# Patient Record
Sex: MALE | NOT HISPANIC OR LATINO | URBAN - METROPOLITAN AREA
[De-identification: names, ages, dates, MRNs, and addresses within clinical notes are randomized per-mention and may not be internally consistent; named-entity substitution may affect disease eponyms.]

---

## 2017-04-24 ENCOUNTER — INPATIENT (INPATIENT)
Facility: HOSPITAL | Age: 82
LOS: 7 days | Discharge: NURSING, REHAB OR LONG-TERM CARE | End: 2017-05-02
Attending: INTERNAL MEDICINE | Admitting: INTERNAL MEDICINE
Payer: COMMERCIAL

## 2017-04-24 VITALS
DIASTOLIC BLOOD PRESSURE: 90 MMHG | SYSTOLIC BLOOD PRESSURE: 148 MMHG | HEART RATE: 80 BPM | OXYGEN SATURATION: 98 % | RESPIRATION RATE: 15 BRPM | HEIGHT: 63 IN | WEIGHT: 134.92 LBS | TEMPERATURE: 98 F

## 2017-04-24 DIAGNOSIS — Z95.2 PRESENCE OF PROSTHETIC HEART VALVE: Chronic | ICD-10-CM

## 2017-04-24 DIAGNOSIS — K56.69 OTHER INTESTINAL OBSTRUCTION: Chronic | ICD-10-CM

## 2017-04-24 PROBLEM — Z00.00 ENCOUNTER FOR PREVENTIVE HEALTH EXAMINATION: Status: ACTIVE | Noted: 2017-04-24

## 2017-04-24 LAB
ALBUMIN SERPL ELPH-MCNC: 3.1 G/DL — LOW (ref 3.3–5)
ALP SERPL-CCNC: 125 U/L — HIGH (ref 40–120)
ALT FLD-CCNC: 13 U/L — SIGNIFICANT CHANGE UP (ref 12–78)
AMYLASE P1 CFR SERPL: 60 U/L — SIGNIFICANT CHANGE UP (ref 25–115)
ANION GAP SERPL CALC-SCNC: 14 MMOL/L — SIGNIFICANT CHANGE UP (ref 5–17)
APPEARANCE UR: CLEAR — SIGNIFICANT CHANGE UP
AST SERPL-CCNC: 15 U/L — SIGNIFICANT CHANGE UP (ref 15–37)
BACTERIA # UR AUTO: ABNORMAL
BASOPHILS # BLD AUTO: 0.1 K/UL — SIGNIFICANT CHANGE UP (ref 0–0.2)
BASOPHILS NFR BLD AUTO: 0.9 % — SIGNIFICANT CHANGE UP (ref 0–2)
BILIRUB SERPL-MCNC: 0.7 MG/DL — SIGNIFICANT CHANGE UP (ref 0.2–1.2)
BILIRUB UR-MCNC: NEGATIVE — SIGNIFICANT CHANGE UP
BUN SERPL-MCNC: 41 MG/DL — HIGH (ref 7–23)
CALCIUM SERPL-MCNC: 9.2 MG/DL — SIGNIFICANT CHANGE UP (ref 8.5–10.1)
CHLORIDE SERPL-SCNC: 100 MMOL/L — SIGNIFICANT CHANGE UP (ref 96–108)
CK MB BLD-MCNC: <1.4 % — SIGNIFICANT CHANGE UP (ref 0–3.5)
CK MB CFR SERPL CALC: <0.5 NG/ML — SIGNIFICANT CHANGE UP (ref 0.5–3.6)
CK SERPL-CCNC: 37 U/L — SIGNIFICANT CHANGE UP (ref 26–308)
CO2 SERPL-SCNC: 22 MMOL/L — SIGNIFICANT CHANGE UP (ref 22–31)
COLOR SPEC: YELLOW — SIGNIFICANT CHANGE UP
CREAT SERPL-MCNC: 1.31 MG/DL — HIGH (ref 0.5–1.3)
DIFF PNL FLD: ABNORMAL
EOSINOPHIL # BLD AUTO: 0 K/UL — SIGNIFICANT CHANGE UP (ref 0–0.5)
EOSINOPHIL NFR BLD AUTO: 0.1 % — SIGNIFICANT CHANGE UP (ref 0–6)
GLUCOSE SERPL-MCNC: 101 MG/DL — HIGH (ref 70–99)
GLUCOSE UR QL: NEGATIVE MG/DL — SIGNIFICANT CHANGE UP
HCT VFR BLD CALC: 35.9 % — LOW (ref 39–50)
HGB BLD-MCNC: 12.3 G/DL — LOW (ref 13–17)
HYALINE CASTS # UR AUTO: ABNORMAL /LPF
KETONES UR-MCNC: ABNORMAL
LEUKOCYTE ESTERASE UR-ACNC: ABNORMAL
LIDOCAIN IGE QN: 94 U/L — SIGNIFICANT CHANGE UP (ref 73–393)
LYMPHOCYTES # BLD AUTO: 1 K/UL — SIGNIFICANT CHANGE UP (ref 1–3.3)
LYMPHOCYTES # BLD AUTO: 6.6 % — LOW (ref 13–44)
MCHC RBC-ENTMCNC: 30.8 PG — SIGNIFICANT CHANGE UP (ref 27–34)
MCHC RBC-ENTMCNC: 34.3 GM/DL — SIGNIFICANT CHANGE UP (ref 32–36)
MCV RBC AUTO: 89.9 FL — SIGNIFICANT CHANGE UP (ref 80–100)
MONOCYTES # BLD AUTO: 0.9 K/UL — SIGNIFICANT CHANGE UP (ref 0–0.9)
MONOCYTES NFR BLD AUTO: 6.4 % — SIGNIFICANT CHANGE UP (ref 2–14)
NEUTROPHILS # BLD AUTO: 12.7 K/UL — HIGH (ref 1.8–7.4)
NEUTROPHILS NFR BLD AUTO: 86 % — HIGH (ref 43–77)
NITRITE UR-MCNC: NEGATIVE — SIGNIFICANT CHANGE UP
PH UR: 5 — SIGNIFICANT CHANGE UP (ref 5–8)
PLATELET # BLD AUTO: 394 K/UL — SIGNIFICANT CHANGE UP (ref 150–400)
POTASSIUM SERPL-MCNC: 4.1 MMOL/L — SIGNIFICANT CHANGE UP (ref 3.5–5.3)
POTASSIUM SERPL-SCNC: 4.1 MMOL/L — SIGNIFICANT CHANGE UP (ref 3.5–5.3)
PROT SERPL-MCNC: 7.9 GM/DL — SIGNIFICANT CHANGE UP (ref 6–8.3)
PROT UR-MCNC: 100 MG/DL
RBC # BLD: 4 M/UL — LOW (ref 4.2–5.8)
RBC # FLD: 13.4 % — SIGNIFICANT CHANGE UP (ref 11–15)
RBC CASTS # UR COMP ASSIST: ABNORMAL /HPF (ref 0–4)
SODIUM SERPL-SCNC: 136 MMOL/L — SIGNIFICANT CHANGE UP (ref 135–145)
SP GR SPEC: 1.02 — SIGNIFICANT CHANGE UP (ref 1.01–1.02)
TROPONIN I SERPL-MCNC: <.015 NG/ML — SIGNIFICANT CHANGE UP (ref 0.01–0.04)
UROBILINOGEN FLD QL: 1 MG/DL
WBC # BLD: 14.8 K/UL — HIGH (ref 3.8–10.5)
WBC # FLD AUTO: 14.8 K/UL — HIGH (ref 3.8–10.5)
WBC UR QL: SIGNIFICANT CHANGE UP

## 2017-04-24 PROCEDURE — 70450 CT HEAD/BRAIN W/O DYE: CPT | Mod: 26

## 2017-04-24 PROCEDURE — 71250 CT THORAX DX C-: CPT | Mod: 26

## 2017-04-24 PROCEDURE — 74176 CT ABD & PELVIS W/O CONTRAST: CPT | Mod: 26

## 2017-04-24 PROCEDURE — 99285 EMERGENCY DEPT VISIT HI MDM: CPT

## 2017-04-24 PROCEDURE — 71010: CPT | Mod: 26

## 2017-04-24 RX ORDER — SODIUM CHLORIDE 9 MG/ML
250 INJECTION INTRAMUSCULAR; INTRAVENOUS; SUBCUTANEOUS ONCE
Qty: 0 | Refills: 0 | Status: COMPLETED | OUTPATIENT
Start: 2017-04-24 | End: 2017-04-24

## 2017-04-24 RX ORDER — MORPHINE SULFATE 50 MG/1
2 CAPSULE, EXTENDED RELEASE ORAL ONCE
Qty: 0 | Refills: 0 | Status: DISCONTINUED | OUTPATIENT
Start: 2017-04-24 | End: 2017-04-24

## 2017-04-24 RX ORDER — IOHEXOL 300 MG/ML
30 INJECTION, SOLUTION INTRAVENOUS ONCE
Qty: 0 | Refills: 0 | Status: COMPLETED | OUTPATIENT
Start: 2017-04-24 | End: 2017-04-24

## 2017-04-24 RX ADMIN — SODIUM CHLORIDE 250 MILLILITER(S): 9 INJECTION INTRAMUSCULAR; INTRAVENOUS; SUBCUTANEOUS at 21:30

## 2017-04-24 RX ADMIN — IOHEXOL 30 MILLILITER(S): 300 INJECTION, SOLUTION INTRAVENOUS at 17:41

## 2017-04-24 RX ADMIN — SODIUM CHLORIDE 250 MILLILITER(S): 9 INJECTION INTRAMUSCULAR; INTRAVENOUS; SUBCUTANEOUS at 19:29

## 2017-04-24 NOTE — ED PROVIDER NOTE - MEDICAL DECISION MAKING DETAILS
lyndsey morse: pt was a signout pending CTs. CT shows b/l lower opacities, possible pna given mild cough/sorethroat and leukocytosis and weakness. pt will be admitted.

## 2017-04-24 NOTE — PHYSICAL THERAPY INITIAL EVALUATION ADULT - PERTINENT HX OF CURRENT PROBLEM, REHAB EVAL
Pt. stated "I just lost power in my legs while I was in the supermarket earlier today, I don't know why but I did not fall."

## 2017-04-24 NOTE — PHYSICAL THERAPY INITIAL EVALUATION ADULT - LIVES WITH, PROFILE
Pt. stated he lives alone in a private house,  food is arrangeed and delivered to his house, stated that he has his neighbors very helpful and helping each other in the block.

## 2017-04-24 NOTE — ED PROVIDER NOTE - PROGRESS NOTE DETAILS
patient seen and evaluated by  and PT; case discussed with Dr. garcia; nephew; patient pending ct results and re evaluation

## 2017-04-24 NOTE — ED PROVIDER NOTE - OBJECTIVE STATEMENT
89 yo malw with hisotry of sbo, coronary bypass presents by ems from [employer] store c/o generalized "weakness" for last two weeks. Patient denies fall, dizziness, abdominal pain, chest pain, diplopia, headache. Patient states that he feels stronger since being in ED.

## 2017-04-24 NOTE — ED ADULT NURSE NOTE - OBJECTIVE STATEMENT
As per PT " I was in the supermarket and all of a sudden I felt weak, I felt like a car running out of [place]as"

## 2017-04-25 DIAGNOSIS — J18.9 PNEUMONIA, UNSPECIFIED ORGANISM: ICD-10-CM

## 2017-04-25 DIAGNOSIS — R53.1 WEAKNESS: ICD-10-CM

## 2017-04-25 DIAGNOSIS — I10 ESSENTIAL (PRIMARY) HYPERTENSION: ICD-10-CM

## 2017-04-25 LAB
ANION GAP SERPL CALC-SCNC: 12 MMOL/L — SIGNIFICANT CHANGE UP (ref 5–17)
ANION GAP SERPL CALC-SCNC: 12 MMOL/L — SIGNIFICANT CHANGE UP (ref 5–17)
APTT BLD: 31.3 SEC — SIGNIFICANT CHANGE UP (ref 27.5–37.4)
BASOPHILS # BLD AUTO: 0.1 K/UL — SIGNIFICANT CHANGE UP (ref 0–0.2)
BASOPHILS NFR BLD AUTO: 0.7 % — SIGNIFICANT CHANGE UP (ref 0–2)
BUN SERPL-MCNC: 34 MG/DL — HIGH (ref 7–23)
BUN SERPL-MCNC: 36 MG/DL — HIGH (ref 7–23)
CALCIUM SERPL-MCNC: 8.6 MG/DL — SIGNIFICANT CHANGE UP (ref 8.5–10.1)
CALCIUM SERPL-MCNC: 8.8 MG/DL — SIGNIFICANT CHANGE UP (ref 8.5–10.1)
CHLORIDE SERPL-SCNC: 102 MMOL/L — SIGNIFICANT CHANGE UP (ref 96–108)
CHLORIDE SERPL-SCNC: 102 MMOL/L — SIGNIFICANT CHANGE UP (ref 96–108)
CO2 SERPL-SCNC: 22 MMOL/L — SIGNIFICANT CHANGE UP (ref 22–31)
CO2 SERPL-SCNC: 23 MMOL/L — SIGNIFICANT CHANGE UP (ref 22–31)
CREAT SERPL-MCNC: 1.03 MG/DL — SIGNIFICANT CHANGE UP (ref 0.5–1.3)
CREAT SERPL-MCNC: 1.19 MG/DL — SIGNIFICANT CHANGE UP (ref 0.5–1.3)
CRP SERPL-MCNC: 10.88 MG/DL — HIGH (ref 0–0.4)
EOSINOPHIL # BLD AUTO: 0 K/UL — SIGNIFICANT CHANGE UP (ref 0–0.5)
EOSINOPHIL NFR BLD AUTO: 0.4 % — SIGNIFICANT CHANGE UP (ref 0–6)
ERYTHROCYTE [SEDIMENTATION RATE] IN BLOOD: 82 MM/HR — HIGH (ref 0–20)
FLUAV SPEC QL CULT: NEGATIVE — SIGNIFICANT CHANGE UP
FLUBV AG SPEC QL IA: NEGATIVE — SIGNIFICANT CHANGE UP
GLUCOSE SERPL-MCNC: 106 MG/DL — HIGH (ref 70–99)
GLUCOSE SERPL-MCNC: 98 MG/DL — SIGNIFICANT CHANGE UP (ref 70–99)
HCT VFR BLD CALC: 33.1 % — LOW (ref 39–50)
HCT VFR BLD CALC: 34 % — LOW (ref 39–50)
HGB BLD-MCNC: 11.2 G/DL — LOW (ref 13–17)
HGB BLD-MCNC: 11.6 G/DL — LOW (ref 13–17)
INR BLD: 1.18 RATIO — HIGH (ref 0.88–1.16)
LACTATE SERPL-SCNC: 1.2 MMOL/L — SIGNIFICANT CHANGE UP (ref 0.7–2)
LACTATE SERPL-SCNC: 1.3 MMOL/L — SIGNIFICANT CHANGE UP (ref 0.7–2)
LYMPHOCYTES # BLD AUTO: 1.9 K/UL — SIGNIFICANT CHANGE UP (ref 1–3.3)
LYMPHOCYTES # BLD AUTO: 15.7 % — SIGNIFICANT CHANGE UP (ref 13–44)
MCHC RBC-ENTMCNC: 30 PG — SIGNIFICANT CHANGE UP (ref 27–34)
MCHC RBC-ENTMCNC: 30.6 PG — SIGNIFICANT CHANGE UP (ref 27–34)
MCHC RBC-ENTMCNC: 33.8 GM/DL — SIGNIFICANT CHANGE UP (ref 32–36)
MCHC RBC-ENTMCNC: 34.2 GM/DL — SIGNIFICANT CHANGE UP (ref 32–36)
MCV RBC AUTO: 88.6 FL — SIGNIFICANT CHANGE UP (ref 80–100)
MCV RBC AUTO: 89.5 FL — SIGNIFICANT CHANGE UP (ref 80–100)
MONOCYTES # BLD AUTO: 1.1 K/UL — HIGH (ref 0–0.9)
MONOCYTES NFR BLD AUTO: 8.9 % — SIGNIFICANT CHANGE UP (ref 2–14)
NEUTROPHILS # BLD AUTO: 9.3 K/UL — HIGH (ref 1.8–7.4)
NEUTROPHILS NFR BLD AUTO: 75.6 % — SIGNIFICANT CHANGE UP (ref 43–77)
PLATELET # BLD AUTO: 364 K/UL — SIGNIFICANT CHANGE UP (ref 150–400)
PLATELET # BLD AUTO: 388 K/UL — SIGNIFICANT CHANGE UP (ref 150–400)
POTASSIUM SERPL-MCNC: 3.7 MMOL/L — SIGNIFICANT CHANGE UP (ref 3.5–5.3)
POTASSIUM SERPL-MCNC: 3.8 MMOL/L — SIGNIFICANT CHANGE UP (ref 3.5–5.3)
POTASSIUM SERPL-SCNC: 3.7 MMOL/L — SIGNIFICANT CHANGE UP (ref 3.5–5.3)
POTASSIUM SERPL-SCNC: 3.8 MMOL/L — SIGNIFICANT CHANGE UP (ref 3.5–5.3)
PROCALCITONIN SERPL-MCNC: 0.29 NG/ML — HIGH (ref 0–0.05)
PROTHROM AB SERPL-ACNC: 12.9 SEC — HIGH (ref 9.8–12.7)
RBC # BLD: 3.74 M/UL — LOW (ref 4.2–5.8)
RBC # BLD: 3.8 M/UL — LOW (ref 4.2–5.8)
RBC # FLD: 13.4 % — SIGNIFICANT CHANGE UP (ref 11–15)
RBC # FLD: 13.5 % — SIGNIFICANT CHANGE UP (ref 11–15)
SODIUM SERPL-SCNC: 136 MMOL/L — SIGNIFICANT CHANGE UP (ref 135–145)
SODIUM SERPL-SCNC: 137 MMOL/L — SIGNIFICANT CHANGE UP (ref 135–145)
WBC # BLD: 12.3 K/UL — HIGH (ref 3.8–10.5)
WBC # BLD: 13.4 K/UL — HIGH (ref 3.8–10.5)
WBC # FLD AUTO: 12.3 K/UL — HIGH (ref 3.8–10.5)
WBC # FLD AUTO: 13.4 K/UL — HIGH (ref 3.8–10.5)

## 2017-04-25 PROCEDURE — 99223 1ST HOSP IP/OBS HIGH 75: CPT

## 2017-04-25 RX ORDER — AZITHROMYCIN 500 MG/1
500 TABLET, FILM COATED ORAL ONCE
Qty: 0 | Refills: 0 | Status: COMPLETED | OUTPATIENT
Start: 2017-04-25 | End: 2017-04-25

## 2017-04-25 RX ORDER — AZITHROMYCIN 500 MG/1
500 TABLET, FILM COATED ORAL EVERY 24 HOURS
Qty: 0 | Refills: 0 | Status: DISCONTINUED | OUTPATIENT
Start: 2017-04-25 | End: 2017-04-27

## 2017-04-25 RX ORDER — CEFTRIAXONE 500 MG/1
1 INJECTION, POWDER, FOR SOLUTION INTRAMUSCULAR; INTRAVENOUS ONCE
Qty: 0 | Refills: 0 | Status: COMPLETED | OUTPATIENT
Start: 2017-04-25 | End: 2017-04-25

## 2017-04-25 RX ORDER — PANTOPRAZOLE SODIUM 20 MG/1
40 TABLET, DELAYED RELEASE ORAL
Qty: 0 | Refills: 0 | Status: DISCONTINUED | OUTPATIENT
Start: 2017-04-25 | End: 2017-05-02

## 2017-04-25 RX ORDER — HEPARIN SODIUM 5000 [USP'U]/ML
5000 INJECTION INTRAVENOUS; SUBCUTANEOUS EVERY 12 HOURS
Qty: 0 | Refills: 0 | Status: DISCONTINUED | OUTPATIENT
Start: 2017-04-25 | End: 2017-04-30

## 2017-04-25 RX ORDER — CEFTRIAXONE 500 MG/1
1 INJECTION, POWDER, FOR SOLUTION INTRAMUSCULAR; INTRAVENOUS EVERY 24 HOURS
Qty: 0 | Refills: 0 | Status: DISCONTINUED | OUTPATIENT
Start: 2017-04-25 | End: 2017-05-02

## 2017-04-25 RX ADMIN — HEPARIN SODIUM 5000 UNIT(S): 5000 INJECTION INTRAVENOUS; SUBCUTANEOUS at 06:33

## 2017-04-25 RX ADMIN — CEFTRIAXONE 100 GRAM(S): 500 INJECTION, POWDER, FOR SOLUTION INTRAMUSCULAR; INTRAVENOUS at 01:03

## 2017-04-25 RX ADMIN — AZITHROMYCIN 255 MILLIGRAM(S): 500 TABLET, FILM COATED ORAL at 01:40

## 2017-04-25 RX ADMIN — HEPARIN SODIUM 5000 UNIT(S): 5000 INJECTION INTRAVENOUS; SUBCUTANEOUS at 17:33

## 2017-04-25 RX ADMIN — PANTOPRAZOLE SODIUM 40 MILLIGRAM(S): 20 TABLET, DELAYED RELEASE ORAL at 09:40

## 2017-04-25 RX ADMIN — AZITHROMYCIN 255 MILLIGRAM(S): 500 TABLET, FILM COATED ORAL at 17:33

## 2017-04-25 RX ADMIN — CEFTRIAXONE 100 GRAM(S): 500 INJECTION, POWDER, FOR SOLUTION INTRAMUSCULAR; INTRAVENOUS at 17:28

## 2017-04-25 NOTE — H&P ADULT. - RS GEN PE MLT RESP DETAILS PC
no rales/breath sounds equal/clear to auscultation bilaterally/good air movement/airway patent/no wheezes/respirations non-labored/no rhonchi

## 2017-04-25 NOTE — H&P ADULT. - HISTORY OF PRESENT ILLNESS
89 y/o man  PMH HTN CABG, AVR brought by EMS from grocery store c/o generalized "weakness" for last two weeks. Has had sore throat for 2 days, and non productive cough today, mild headache  but no chest pain, SOB, palpitations, wheezing, fever or chills. No sick contacts or travel. Patient denies fall, dizziness, vertigo, abdominal pain, chest pain, diplopia, speech difficulty, numbness, limb weakness. Patient states that he feels stronger since being in ED. Pt had flu shot .

## 2017-04-25 NOTE — H&P ADULT. - NEGATIVE CARDIOVASCULAR SYMPTOMS
no paroxysmal nocturnal dyspnea/no palpitations/no peripheral edema/no dyspnea on exertion/no chest pain/no orthopnea

## 2017-04-25 NOTE — H&P ADULT. - RADIOLOGY RESULTS AND INTERPRETATION
CT chest Bibasilar lower lobe pulmonary opacities likely reflect atelectasis   although some element of aspiration is possible

## 2017-04-25 NOTE — ED ADULT NURSE REASSESSMENT NOTE - NS ED NURSE REASSESS COMMENT FT1
received pt lying on stretcher alert and orientedx3 , denies any pain at -present. 2uage tot eht rt forearm

## 2017-04-25 NOTE — H&P ADULT. - GASTROINTESTINAL DETAILS
no rebound tenderness/nontender/soft/no guarding/no masses palpable/bowel sounds normal/no distention

## 2017-04-25 NOTE — H&P ADULT. - ASSESSMENT
89 y/o hypertensive woman with weakness and cough, also has sore throat with leukocytosis. Possible viral syndrome +/- pneumonia.  Rapid flu and respiratory viral panel sent. Will treat as outpatient pneumonia. 89 y/o hypertensive man with weakness and cough, also has sore throat with leukocytosis. Possible viral syndrome +/- pneumonia.  Rapid flu and respiratory viral panel sent. Will treat as outpatient pneumonia.

## 2017-04-25 NOTE — H&P ADULT. - NEGATIVE NEUROLOGICAL SYMPTOMS
no confusion/no vertigo/no loss of consciousness/no syncope/no paresthesias/no transient paralysis/no facial palsy/no weakness/no loss of sensation/no generalized seizures

## 2017-04-26 DIAGNOSIS — R26.81 UNSTEADINESS ON FEET: ICD-10-CM

## 2017-04-26 DIAGNOSIS — I15.9 SECONDARY HYPERTENSION, UNSPECIFIED: ICD-10-CM

## 2017-04-26 DIAGNOSIS — D72.829 ELEVATED WHITE BLOOD CELL COUNT, UNSPECIFIED: ICD-10-CM

## 2017-04-26 LAB
ALBUMIN SERPL ELPH-MCNC: 2.3 G/DL — LOW (ref 3.3–5)
ALP SERPL-CCNC: 95 U/L — SIGNIFICANT CHANGE UP (ref 40–120)
ALT FLD-CCNC: 10 U/L — LOW (ref 12–78)
ANION GAP SERPL CALC-SCNC: 9 MMOL/L — SIGNIFICANT CHANGE UP (ref 5–17)
AST SERPL-CCNC: 12 U/L — LOW (ref 15–37)
BILIRUB SERPL-MCNC: 0.4 MG/DL — SIGNIFICANT CHANGE UP (ref 0.2–1.2)
BUN SERPL-MCNC: 30 MG/DL — HIGH (ref 7–23)
CALCIUM SERPL-MCNC: 8.5 MG/DL — SIGNIFICANT CHANGE UP (ref 8.5–10.1)
CHLORIDE SERPL-SCNC: 105 MMOL/L — SIGNIFICANT CHANGE UP (ref 96–108)
CO2 SERPL-SCNC: 24 MMOL/L — SIGNIFICANT CHANGE UP (ref 22–31)
CREAT SERPL-MCNC: 1.07 MG/DL — SIGNIFICANT CHANGE UP (ref 0.5–1.3)
GLUCOSE SERPL-MCNC: 93 MG/DL — SIGNIFICANT CHANGE UP (ref 70–99)
HCT VFR BLD CALC: 32 % — LOW (ref 39–50)
HGB BLD-MCNC: 11.1 G/DL — LOW (ref 13–17)
MCHC RBC-ENTMCNC: 31.1 PG — SIGNIFICANT CHANGE UP (ref 27–34)
MCHC RBC-ENTMCNC: 34.7 GM/DL — SIGNIFICANT CHANGE UP (ref 32–36)
MCV RBC AUTO: 89.6 FL — SIGNIFICANT CHANGE UP (ref 80–100)
PLATELET # BLD AUTO: 382 K/UL — SIGNIFICANT CHANGE UP (ref 150–400)
POTASSIUM SERPL-MCNC: 3.8 MMOL/L — SIGNIFICANT CHANGE UP (ref 3.5–5.3)
POTASSIUM SERPL-SCNC: 3.8 MMOL/L — SIGNIFICANT CHANGE UP (ref 3.5–5.3)
PROCALCITONIN SERPL-MCNC: 0.19 NG/ML — HIGH (ref 0–0.05)
PROT SERPL-MCNC: 6.3 GM/DL — SIGNIFICANT CHANGE UP (ref 6–8.3)
RBC # BLD: 3.57 M/UL — LOW (ref 4.2–5.8)
RBC # FLD: 13.8 % — SIGNIFICANT CHANGE UP (ref 11–15)
SODIUM SERPL-SCNC: 138 MMOL/L — SIGNIFICANT CHANGE UP (ref 135–145)
WBC # BLD: 11.4 K/UL — HIGH (ref 3.8–10.5)
WBC # FLD AUTO: 11.4 K/UL — HIGH (ref 3.8–10.5)

## 2017-04-26 PROCEDURE — 99253 IP/OBS CNSLTJ NEW/EST LOW 45: CPT

## 2017-04-26 PROCEDURE — 99233 SBSQ HOSP IP/OBS HIGH 50: CPT

## 2017-04-26 RX ADMIN — HEPARIN SODIUM 5000 UNIT(S): 5000 INJECTION INTRAVENOUS; SUBCUTANEOUS at 17:33

## 2017-04-26 RX ADMIN — PANTOPRAZOLE SODIUM 40 MILLIGRAM(S): 20 TABLET, DELAYED RELEASE ORAL at 05:24

## 2017-04-26 RX ADMIN — AZITHROMYCIN 255 MILLIGRAM(S): 500 TABLET, FILM COATED ORAL at 17:22

## 2017-04-26 RX ADMIN — HEPARIN SODIUM 5000 UNIT(S): 5000 INJECTION INTRAVENOUS; SUBCUTANEOUS at 05:24

## 2017-04-26 RX ADMIN — CEFTRIAXONE 100 GRAM(S): 500 INJECTION, POWDER, FOR SOLUTION INTRAMUSCULAR; INTRAVENOUS at 17:32

## 2017-04-26 NOTE — CONSULT NOTE ADULT - SUBJECTIVE AND OBJECTIVE BOX
Infectious Diseases - Attending at Dr. Ni    HPI:  89 y/o man  PMH HTN CABG, AVR brought by EMS from grocery store c/o generalized "weakness" for last two weeks.occasional cough for a long time .  but no chest pain, SOB, palpitations, wheezing, fever or chills. No sick contacts or travel. Patient denies fall, dizziness, vertigo, abdominal pain, chest pain, diplopia, speech difficulty, numbness, limb weakness. Patient states that he feels stronger since being in ED. Pt had flu shot . (25 Apr 2017 02:28)    Ct abdomen /chest shows basal infiltrates    PAST MEDICAL & SURGICAL HISTORY:  Shingles  SBO (small bowel obstruction)  HTN (hypertension)  S/P AVR (aortic valve replacement)  SBO (small bowel obstruction)      Allergies    No Known Allergies    Intolerances        FAMILY HISTORY:  No pertinent family history in first degree relatives      SOCIAL HISTORY:exsmoker    REVIEW OF SYSTEMS:    Constitutional: No fever, weight loss +weakness  Eyes: No eye pain, visual disturbances, or discharge  ENT:  No difficulty hearing, tinnitus, vertigo; No sinus or throat pain  Neck: No pain or stiffness  Respiratory: occasional  cough, No wheezing, chills or hemoptysis  Cardiovascular: No chest pain, palpitations, shortness of breath, dizziness or leg swelling  Gastrointestinal: No abdominal or epigastric pain. No nausea, vomiting or hematemesis; No diarrhea or constipation. No melena or hematochezia.  Genitourinary: No dysuria, frequency, hematuria or incontinence  Neurological: No headaches, memory loss, loss of strength, numbness or tremors  Skin: No itching, burning, rashes or lesions       MEDICATIONS  (STANDING):  azithromycin  IVPB 500milliGRAM(s) IV Intermittent every 24 hours  cefTRIAXone   IVPB 1Gram(s) IV Intermittent every 24 hours  heparin  Injectable 5000Unit(s) SubCutaneous every 12 hours  pantoprazole    Tablet 40milliGRAM(s) Oral before breakfast    MEDICATIONS  (PRN):      Vital Signs Last 24 Hrs  T(C): 36.7, Max: 36.9 (04-26 @ 05:00)  T(F): 98, Max: 98.4 (04-26 @ 05:00)  HR: 78 (68 - 84)  BP: 166/74 (120/73 - 166/74)  BP(mean): --  RR: 18 (16 - 18)  SpO2: 98% (98% - 98%)    PHYSICAL EXAM:    Constitutional: NAD, well-groomed, well-developed  HEENT: PERRLA, EOMI, Normal Hearing, MMM  Neck: No LAD, No JVD  Back: Normal spine flexure, No CVA tenderness  Respiratory: CTAB/L  Cardiovascular: S1 and S2, RRR, no M/G/R  Gastrointestinal: BS+, soft, NT/ND  Extremities: No peripheral edema  Vascular: 2+ peripheral pulses  Neurological: A/O x 3, no focal deficits  Skin: No rashes      LABS:                        11.1   11.4  )-----------( 382      ( 26 Apr 2017 07:32 )             32.0     04-26    138  |  105  |  30<H>  ----------------------------<  93  3.8   |  24  |  1.07    Ca    8.5      26 Apr 2017 07:32    TPro  6.3  /  Alb  2.3<L>  /  TBili  0.4  /  DBili  x   /  AST  12<L>  /  ALT  10<L>  /  AlkPhos  95  04-26    PT/INR - ( 25 Apr 2017 01:10 )   PT: 12.9 sec;   INR: 1.18 ratio         PTT - ( 25 Apr 2017 01:10 )  PTT:31.3 sec      MICROBIOLOGY:  RECENT CULTURES:  04-25 .Blood Blood-Venous XXXX XXXX   No growth to date.          RADIOLOGY & ADDITIONAL STUDIES:  CT chest -bibasilar infiltrates

## 2017-04-26 NOTE — PROGRESS NOTE ADULT - SUBJECTIVE AND OBJECTIVE BOX
Patient is a 89y old  Male who presents with a chief complaint of Increasing weakness over several weeks, cough today. (2017 02:28)      OVERNIGHT EVENTS: none    REVIEW OF SYSTEMS: poor historian, denies CP/SOB, F/C, Headache, dizziness, nausea, vomiting, abdominal pain     MEDICATIONS  (STANDING):  azithromycin  IVPB 500milliGRAM(s) IV Intermittent every 24 hours  cefTRIAXone   IVPB 1Gram(s) IV Intermittent every 24 hours  heparin  Injectable 5000Unit(s) SubCutaneous every 12 hours  pantoprazole    Tablet 40milliGRAM(s) Oral before breakfast    MEDICATIONS  (PRN):      Allergies    No Known Allergies    Intolerances        T(F): 98, Max: 98.4 ( @ 05:00)  HR: 77 (67 - 84)  BP: 127/68 (120/73 - 148/87)  RR: 18 (16 - 18)  SpO2: 98% (98% - 98%)  Wt(kg): --    PHYSICAL EXAM:  GENERAL: NAD, well-groomed, well-developed, elderly male   HEAD:  Atraumatic, Normocephalic  EYES: EOMI, PERRLA, conjunctiva and sclera clear  ENMT: No tonsillar erythema, exudates, or enlargement; Moist mucous membranes, Good dentition, No lesions  NECK: Supple, No JVD, Normal thyroid  NERVOUS SYSTEM:  awake, Alert, Good concentration; Motor Strength 5/5 B/L upper and lower extremities; DTRs 2+ intact and symmetric  CHEST/LUNG: Clear to percussion bilaterally; No rales, rhonchi, wheezing, or rubs BL  HEART: Regular rate and rhythm; No murmurs, rubs, or gallops  ABDOMEN: Soft, Nontender, Nondistended; Bowel sounds present  EXTREMITIES:  2+ Peripheral Pulses, No clubbing, cyanosis, or edema BL LE  LYMPH: No lymphadenopathy noted  SKIN: No rashes or lesions    LABS:                        11.1   11.4  )-----------( 382      ( 2017 07:32 )             32.0         138  |  105  |  30<H>  ----------------------------<  93  3.8   |  24  |  1.07    Ca    8.5      2017 07:32    TPro  6.3  /  Alb  2.3<L>  /  TBili  0.4  /  DBili  x   /  AST  12<L>  /  ALT  10<L>  /  AlkPhos  95  04-26    PT/INR - ( 2017 01:10 )   PT: 12.9 sec;   INR: 1.18 ratio         PTT - ( 2017 01:10 )  PTT:31.3 sec  Urinalysis Basic - ( 2017 17:18 )    Color: Yellow / Appearance: Clear / S.025 / pH: x  Gluc: x / Ketone: Trace  / Bili: Negative / Urobili: 1 mg/dL   Blood: x / Protein: 100 mg/dL / Nitrite: Negative   Leuk Esterase: Trace / RBC: 3-5 /HPF / WBC 3-5   Sq Epi: x / Non Sq Epi: x / Bacteria: Moderate      Cultures;   CAPILLARY BLOOD GLUCOSE    Lipid panel:     CARDIAC MARKERS ( 2017 16:49 )  <.015 ng/mL / x     / 37 U/L / x     / <0.5 ng/mL        RADIOLOGY & ADDITIONAL TESTS:    Imaging Personally Reviewed:  [x ] YES      Consultant(s) Notes Reviewed:  [x ] YES     Care Discussed with [x ] Consultants [X ] Patient [x ] Family; Nephew   [x ]    [x ]  Other; RN

## 2017-04-26 NOTE — PROGRESS NOTE ADULT - ASSESSMENT
89 y/o hypertensive man with weakness and cough, also has sore throat with leukocytosis. Possible viral syndrome +/- pneumonia. Rapid flu and respiratory viral panel neg. CXR-Resolution of congestive changes noted previously. Residual opacity left lung base. CT imaging of the chest recommended. Status post CABG. Blood cx- no growth. TTE- EF - 55 to 60%.

## 2017-04-27 DIAGNOSIS — L89.151 PRESSURE ULCER OF SACRAL REGION, STAGE 1: ICD-10-CM

## 2017-04-27 LAB
ANION GAP SERPL CALC-SCNC: 12 MMOL/L — SIGNIFICANT CHANGE UP (ref 5–17)
BASOPHILS # BLD AUTO: 0.1 K/UL — SIGNIFICANT CHANGE UP (ref 0–0.2)
BASOPHILS NFR BLD AUTO: 0.6 % — SIGNIFICANT CHANGE UP (ref 0–2)
BUN SERPL-MCNC: 28 MG/DL — HIGH (ref 7–23)
CALCIUM SERPL-MCNC: 8.7 MG/DL — SIGNIFICANT CHANGE UP (ref 8.5–10.1)
CHLORIDE SERPL-SCNC: 102 MMOL/L — SIGNIFICANT CHANGE UP (ref 96–108)
CO2 SERPL-SCNC: 24 MMOL/L — SIGNIFICANT CHANGE UP (ref 22–31)
CREAT SERPL-MCNC: 1.12 MG/DL — SIGNIFICANT CHANGE UP (ref 0.5–1.3)
EOSINOPHIL # BLD AUTO: 0.1 K/UL — SIGNIFICANT CHANGE UP (ref 0–0.5)
EOSINOPHIL NFR BLD AUTO: 0.5 % — SIGNIFICANT CHANGE UP (ref 0–6)
GLUCOSE SERPL-MCNC: 90 MG/DL — SIGNIFICANT CHANGE UP (ref 70–99)
HCT VFR BLD CALC: 34 % — LOW (ref 39–50)
HGB BLD-MCNC: 11.6 G/DL — LOW (ref 13–17)
LYMPHOCYTES # BLD AUTO: 17 % — SIGNIFICANT CHANGE UP (ref 13–44)
LYMPHOCYTES # BLD AUTO: 2 K/UL — SIGNIFICANT CHANGE UP (ref 1–3.3)
MCHC RBC-ENTMCNC: 30.6 PG — SIGNIFICANT CHANGE UP (ref 27–34)
MCHC RBC-ENTMCNC: 34 GM/DL — SIGNIFICANT CHANGE UP (ref 32–36)
MCV RBC AUTO: 89.8 FL — SIGNIFICANT CHANGE UP (ref 80–100)
MONOCYTES # BLD AUTO: 1 K/UL — HIGH (ref 0–0.9)
MONOCYTES NFR BLD AUTO: 8.1 % — SIGNIFICANT CHANGE UP (ref 2–14)
NEUTROPHILS # BLD AUTO: 8.9 K/UL — HIGH (ref 1.8–7.4)
NEUTROPHILS NFR BLD AUTO: 73.8 % — SIGNIFICANT CHANGE UP (ref 43–77)
PLATELET # BLD AUTO: 422 K/UL — HIGH (ref 150–400)
POTASSIUM SERPL-MCNC: 3.9 MMOL/L — SIGNIFICANT CHANGE UP (ref 3.5–5.3)
POTASSIUM SERPL-SCNC: 3.9 MMOL/L — SIGNIFICANT CHANGE UP (ref 3.5–5.3)
RBC # BLD: 3.78 M/UL — LOW (ref 4.2–5.8)
RBC # FLD: 13.6 % — SIGNIFICANT CHANGE UP (ref 11–15)
SODIUM SERPL-SCNC: 138 MMOL/L — SIGNIFICANT CHANGE UP (ref 135–145)
WBC # BLD: 12.1 K/UL — HIGH (ref 3.8–10.5)
WBC # FLD AUTO: 12.1 K/UL — HIGH (ref 3.8–10.5)

## 2017-04-27 PROCEDURE — 99233 SBSQ HOSP IP/OBS HIGH 50: CPT

## 2017-04-27 RX ORDER — AMLODIPINE BESYLATE 2.5 MG/1
10 TABLET ORAL DAILY
Qty: 0 | Refills: 0 | Status: DISCONTINUED | OUTPATIENT
Start: 2017-04-27 | End: 2017-05-02

## 2017-04-27 RX ORDER — AZITHROMYCIN 500 MG/1
500 TABLET, FILM COATED ORAL DAILY
Qty: 0 | Refills: 0 | Status: DISCONTINUED | OUTPATIENT
Start: 2017-04-27 | End: 2017-05-02

## 2017-04-27 RX ADMIN — HEPARIN SODIUM 5000 UNIT(S): 5000 INJECTION INTRAVENOUS; SUBCUTANEOUS at 05:11

## 2017-04-27 RX ADMIN — CEFTRIAXONE 100 GRAM(S): 500 INJECTION, POWDER, FOR SOLUTION INTRAMUSCULAR; INTRAVENOUS at 17:28

## 2017-04-27 RX ADMIN — AMLODIPINE BESYLATE 10 MILLIGRAM(S): 2.5 TABLET ORAL at 10:58

## 2017-04-27 RX ADMIN — Medication 200 MILLIGRAM(S): at 12:34

## 2017-04-27 RX ADMIN — PANTOPRAZOLE SODIUM 40 MILLIGRAM(S): 20 TABLET, DELAYED RELEASE ORAL at 05:11

## 2017-04-27 RX ADMIN — HEPARIN SODIUM 5000 UNIT(S): 5000 INJECTION INTRAVENOUS; SUBCUTANEOUS at 17:31

## 2017-04-27 RX ADMIN — AZITHROMYCIN 255 MILLIGRAM(S): 500 TABLET, FILM COATED ORAL at 18:02

## 2017-04-27 NOTE — PROGRESS NOTE ADULT - SUBJECTIVE AND OBJECTIVE BOX
Patient is a 89y old  Male who presents with a chief complaint of Increasing weakness over several weeks, cough today. (25 Apr 2017 02:28)      INTERVAL HPI / OVERNIGHT EVENTS:    MEDICATIONS  (STANDING):  cefTRIAXone   IVPB 1Gram(s) IV Intermittent every 24 hours  heparin  Injectable 5000Unit(s) SubCutaneous every 12 hours  pantoprazole    Tablet 40milliGRAM(s) Oral before breakfast  amLODIPine   Tablet 10milliGRAM(s) Oral daily  azithromycin   Tablet 500milliGRAM(s) Oral daily    MEDICATIONS  (PRN):  guaiFENesin   Syrup  (Sugar-Free) 200milliGRAM(s) Oral every 6 hours PRN Cough      Vital Signs Last 24 Hrs  T(C): 37, Max: 37.7 (04-27 @ 10:10)  T(F): 98.6, Max: 99.8 (04-27 @ 10:10)  HR: 88 (82 - 88)  BP: 150/69 (148/77 - 173/76)  BP(mean): --  RR: 16 (16 - 29)  SpO2: 99% (98% - 99%)    PHYSICAL EXAM:  General :NAD  Constitutional:  well-groomed, well-developed  Respiratory: CTAB/L  Cardiovascular: S1 and S2, RRR, no M/G/R  Gastrointestinal: BS+, soft, NT/ND  Extremities: No peripheral edema  Vascular: 2+ peripheral pulses  Skin: No rashes      LABS:                        11.6   12.1  )-----------( 422      ( 27 Apr 2017 07:18 )             34.0     04-27    138  |  102  |  28<H>  ----------------------------<  90  3.9   |  24  |  1.12    Ca    8.7      27 Apr 2017 07:18    TPro  6.3  /  Alb  2.3<L>  /  TBili  0.4  /  DBili  x   /  AST  12<L>  /  ALT  10<L>  /  AlkPhos  95  04-26          MICROBIOLOGY:  RECENT CULTURES:  04-25 .Blood Blood-Venous XXXX XXXX   No growth to date.          RADIOLOGY & ADDITIONAL STUDIES: Patient is a 89y old  Male who presents with a chief complaint of Increasing weakness over several weeks, cough today. (25 Apr 2017 02:28)      INTERVAL HPI / OVERNIGHT EVENTS:c/o dry cough,no fever,no SOB    MEDICATIONS  (STANDING):  cefTRIAXone   IVPB 1Gram(s) IV Intermittent every 24 hours  heparin  Injectable 5000Unit(s) SubCutaneous every 12 hours  pantoprazole    Tablet 40milliGRAM(s) Oral before breakfast  amLODIPine   Tablet 10milliGRAM(s) Oral daily  azithromycin   Tablet 500milliGRAM(s) Oral daily    MEDICATIONS  (PRN):  guaiFENesin   Syrup  (Sugar-Free) 200milliGRAM(s) Oral every 6 hours PRN Cough      Vital Signs Last 24 Hrs  T(C): 37, Max: 37.7 (04-27 @ 10:10)  T(F): 98.6, Max: 99.8 (04-27 @ 10:10)  HR: 88 (82 - 88)  BP: 150/69 (148/77 - 173/76)  BP(mean): --  RR: 16 (16 - 29)  SpO2: 99% (98% - 99%)    PHYSICAL EXAM:  General :NAD  Constitutional:  well-groomed, well-developed  Respiratory: CTAB/L  Cardiovascular: S1 and S2, RRR, no M/G/R  Gastrointestinal: BS+, soft, NT/ND  Extremities: No peripheral edema  Vascular: 2+ peripheral pulses  Skin: No rashes      LABS:                        11.6   12.1  )-----------( 422      ( 27 Apr 2017 07:18 )             34.0     04-27    138  |  102  |  28<H>  ----------------------------<  90  3.9   |  24  |  1.12    Ca    8.7      27 Apr 2017 07:18    TPro  6.3  /  Alb  2.3<L>  /  TBili  0.4  /  DBili  x   /  AST  12<L>  /  ALT  10<L>  /  AlkPhos  95  04-26          MICROBIOLOGY:  RECENT CULTURES:  04-25 .Blood Blood-Venous XXXX XXXX   No growth to date.          RADIOLOGY & ADDITIONAL STUDIES:

## 2017-04-27 NOTE — PROGRESS NOTE ADULT - SUBJECTIVE AND OBJECTIVE BOX
patient feeling better   occasional cough    Vital Signs Last 24 Hrs  T(C): 37, Max: 37.7 (04-27 @ 10:10)  T(F): 98.6, Max: 99.8 (04-27 @ 10:10)  HR: 88 (82 - 88)  BP: 150/69 (148/77 - 173/76)  BP(mean): --  RR: 16 (16 - 29)  SpO2: 99% (98% - 99%)PHYSICAL EXAM:          Respiratory: lungs clear    Cardiovascular: S1S2 reguar    Gastrointestinal:soft , BS+      Extremities:ecc neg      MEDICATIONS  (STANDING):  cefTRIAXone   IVPB 1Gram(s) IV Intermittent every 24 hours  heparin  Injectable 5000Unit(s) SubCutaneous every 12 hours  pantoprazole    Tablet 40milliGRAM(s) Oral before breakfast  amLODIPine   Tablet 10milliGRAM(s) Oral daily  azithromycin   Tablet 500milliGRAM(s) Oral daily    MEDICATIONS  (PRN):  guaiFENesin   Syrup  (Sugar-Free) 200milliGRAM(s) Oral every 6 hours PRN Cough  LIVER FUNCTIONS - ( 26 Apr 2017 07:32 )  Alb: 2.3 g/dL / Pro: 6.3 gm/dL / ALK PHOS: 95 U/L / ALT: 10 U/L / AST: 12 U/L / GGT: x         04-27    138  |  102  |  28<H>  ----------------------------<  90  3.9   |  24  |  1.12    Ca    8.7      27 Apr 2017 07:18    TPro  6.3  /  Alb  2.3<L>  /  TBili  0.4  /  DBili  x   /  AST  12<L>  /  ALT  10<L>  /  AlkPhos  95  04-26  CBC Full  -  ( 27 Apr 2017 07:18 )  WBC Count : 12.1 K/uL  Hemoglobin : 11.6 g/dL  Hematocrit : 34.0 %  Platelet Count - Automated : 422 K/uL  Mean Cell Volume : 89.8 fl  Mean Cell Hemoglobin : 30.6 pg  Mean Cell Hemoglobin Concentration : 34.0 gm/dL  Auto Neutrophil # : 8.9 K/uL  Auto Lymphocyte # : 2.0 K/uL  Auto Monocyte # : 1.0 K/uL  Auto Eosinophil # : 0.1 K/uL  Auto Basophil # : 0.1 K/uL  Auto Neutrophil % : 73.8 %  Auto Lymphocyte % : 17.0 %  Auto Monocyte % : 8.1 %  Auto Eosinophil % : 0.5 %  Auto Basophil % : 0.6 %

## 2017-04-27 NOTE — PROGRESS NOTE ADULT - SUBJECTIVE AND OBJECTIVE BOX
Patient is a 89y old  Male who presents with a chief complaint of Increasing weakness over several weeks, cough today. (25 Apr 2017 02:28)      OVERNIGHT EVENTS: Cough with minimal cough    REVIEW OF SYSTEMS: denies chest pain/SOB, diaphoresis, no F/C, dizziness, headache, blurry vision, nausea, vomiting, abdominal pain. Rest unremarkable     MEDICATIONS  (STANDING):  azithromycin  IVPB 500milliGRAM(s) IV Intermittent every 24 hours  cefTRIAXone   IVPB 1Gram(s) IV Intermittent every 24 hours  heparin  Injectable 5000Unit(s) SubCutaneous every 12 hours  pantoprazole    Tablet 40milliGRAM(s) Oral before breakfast  amLODIPine   Tablet 10milliGRAM(s) Oral daily    MEDICATIONS  (PRN):  guaiFENesin   Syrup  (Sugar-Free) 200milliGRAM(s) Oral every 6 hours PRN Cough      Allergies    No Known Allergies    Intolerances        T(F): 99, Max: 99.8 (04-27 @ 10:10)  HR: 87 (78 - 87)  BP: 148/77 (148/77 - 173/76)  RR: 20 (16 - 29)  SpO2: 98% (98% - 98%)  Wt(kg): --    PHYSICAL EXAM:  GENERAL: NAD, well-groomed, well-developed, elderly male   HEAD:  Atraumatic, Normocephalic  EYES: EOMI, PERRLA, conjunctiva and sclera clear  ENMT: No tonsillar erythema, exudates, or enlargement; Moist mucous membranes, Good dentition, No lesions  NECK: Supple, No JVD, Normal thyroid  NERVOUS SYSTEM:  awake, Alert, Good concentration; Motor Strength 5/5 B/L upper and lower extremities; DTRs 2+ intact and symmetric  CHEST/LUNG: Clear to percussion bilaterally; No rales, rhonchi, wheezing, or rubs BL  HEART: Regular rate and rhythm; No murmurs, rubs, or gallops  ABDOMEN: Soft, Nontender, Nondistended; Bowel sounds present  EXTREMITIES:  2+ Peripheral Pulses, No clubbing, cyanosis, or edema BL LE  LYMPH: No lymphadenopathy noted  SKIN: R cheek mole, stage 1 sacral and BL heel decubiti POA      LABS:                        11.6   12.1  )-----------( 422      ( 27 Apr 2017 07:18 )             34.0     04-27    138  |  102  |  28<H>  ----------------------------<  90  3.9   |  24  |  1.12    Ca    8.7      27 Apr 2017 07:18    TPro  6.3  /  Alb  2.3<L>  /  TBili  0.4  /  DBili  x   /  AST  12<L>  /  ALT  10<L>  /  AlkPhos  95  04-26        Cultures;   CAPILLARY BLOOD GLUCOSE    Lipid panel:       RADIOLOGY & ADDITIONAL TESTS:    Imaging Personally Reviewed:  [ x] YES      Consultant(s) Notes Reviewed:  [ x] YES     Care Discussed with [x ] Consultants [X ] Patient [ ] Family  [x ]    [x ]  Other; RN

## 2017-04-27 NOTE — PROGRESS NOTE ADULT - ASSESSMENT
89 y/o hypertensive man with weakness and cough, also has sore throat with leukocytosis. Possible viral syndrome +/- pneumonia. Rapid flu and respiratory viral panel neg. CXR-Resolution of congestive changes noted previously. Residual opacity left lung base. CT imaging of the chest recommended. Status post CABG. Blood cx- no growth. TTE- EF - 55 to 60%. Blood cx- No growth to date.

## 2017-04-28 DIAGNOSIS — J18.9 PNEUMONIA, UNSPECIFIED ORGANISM: ICD-10-CM

## 2017-04-28 PROCEDURE — 99233 SBSQ HOSP IP/OBS HIGH 50: CPT

## 2017-04-28 PROCEDURE — 71010: CPT | Mod: 26

## 2017-04-28 RX ORDER — AMLODIPINE BESYLATE 2.5 MG/1
1 TABLET ORAL
Qty: 0 | Refills: 0 | COMMUNITY
Start: 2017-04-28

## 2017-04-28 RX ORDER — CEFTRIAXONE 500 MG/1
1 INJECTION, POWDER, FOR SOLUTION INTRAMUSCULAR; INTRAVENOUS
Qty: 0 | Refills: 0 | COMMUNITY
Start: 2017-04-28 | End: 2017-05-01

## 2017-04-28 RX ORDER — LATANOPROST 0.05 MG/ML
1 SOLUTION/ DROPS OPHTHALMIC; TOPICAL AT BEDTIME
Qty: 0 | Refills: 0 | Status: DISCONTINUED | OUTPATIENT
Start: 2017-04-28 | End: 2017-05-02

## 2017-04-28 RX ORDER — AZITHROMYCIN 500 MG/1
1 TABLET, FILM COATED ORAL
Qty: 0 | Refills: 0 | COMMUNITY
Start: 2017-04-28

## 2017-04-28 RX ORDER — PANTOPRAZOLE SODIUM 20 MG/1
1 TABLET, DELAYED RELEASE ORAL
Qty: 0 | Refills: 0 | COMMUNITY
Start: 2017-04-28

## 2017-04-28 RX ADMIN — HEPARIN SODIUM 5000 UNIT(S): 5000 INJECTION INTRAVENOUS; SUBCUTANEOUS at 17:28

## 2017-04-28 RX ADMIN — AZITHROMYCIN 500 MILLIGRAM(S): 500 TABLET, FILM COATED ORAL at 11:43

## 2017-04-28 RX ADMIN — AMLODIPINE BESYLATE 10 MILLIGRAM(S): 2.5 TABLET ORAL at 05:24

## 2017-04-28 RX ADMIN — LATANOPROST 1 DROP(S): 0.05 SOLUTION/ DROPS OPHTHALMIC; TOPICAL at 23:21

## 2017-04-28 RX ADMIN — CEFTRIAXONE 100 GRAM(S): 500 INJECTION, POWDER, FOR SOLUTION INTRAMUSCULAR; INTRAVENOUS at 17:28

## 2017-04-28 RX ADMIN — HEPARIN SODIUM 5000 UNIT(S): 5000 INJECTION INTRAVENOUS; SUBCUTANEOUS at 05:25

## 2017-04-28 RX ADMIN — PANTOPRAZOLE SODIUM 40 MILLIGRAM(S): 20 TABLET, DELAYED RELEASE ORAL at 05:24

## 2017-04-28 RX ADMIN — Medication 200 MILLIGRAM(S): at 11:44

## 2017-04-28 NOTE — DISCHARGE NOTE ADULT - CARE PROVIDER_API CALL
Ketty Brown (AZIZA), Infectious Disease; Internal Medicine  [address] [place] [address]  Phone: [phone]  Fax: [phone]    Medardo Rg), Internal Medicine  [address] [place] [address]  Phone: [phone]  Fax: [phone]

## 2017-04-28 NOTE — PHYSICAL THERAPY INITIAL EVALUATION ADULT - PERTINENT HX OF CURRENT PROBLEM, REHAB EVAL
Pt is a 89yo M admitted secondary to weakness, cough, & mechanical fall. WBC: 12.1 (trending up), H/H: 11.6/34.o, & procalcitonin: 0.19 (trending down). TTE: EF: 55-60%, moderate-severe tricuspid regurgitation, severe pulmonary hypertension, & mildly enlarged left atrium. X-ray chest: opacity in left lung base....cont below...

## 2017-04-28 NOTE — DISCHARGE NOTE ADULT - MEDICATION SUMMARY - MEDICATIONS TO TAKE
I will START or STAY ON the medications listed below when I get home from the hospital:    amLODIPine 10 mg oral tablet  -- 1 tab(s) by mouth once a day  -- Indication: For Essential hypertension    cefTRIAXone  -- 1 gram(s) intravenous once a day  -- Indication: For Pneumonia due to infectious organism, unspecified laterality, unspecified part of lung    guaiFENesin 100 mg/5 mL oral liquid  -- 10 milliliter(s) by mouth every 6 hours, As needed, Cough  -- Indication: For Pneumonia due to infectious organism, unspecified laterality, unspecified part of lung    azithromycin 500 mg oral tablet  -- 1 tab(s) by mouth once a day  -- Indication: For Pneumonia due to infectious organism, unspecified laterality, unspecified part of lung    pantoprazole 40 mg oral delayed release tablet  -- 1 tab(s) by mouth once a day (before a meal)  -- Indication: For GERD I will START or STAY ON the medications listed below when I get home from the hospital:    amLODIPine 10 mg oral tablet  -- 1 tab(s) by mouth once a day  -- Indication: For Essential hypertension    cefTRIAXone  -- 1 gram(s) intravenous once a day  -- Indication: For Pneumonia due to infectious organism, unspecified laterality, unspecified part of lung    guaiFENesin 100 mg/5 mL oral liquid  -- 10 milliliter(s) by mouth every 6 hours, As needed, Cough  -- Indication: For Pneumonia due to infectious organism, unspecified laterality, unspecified part of lung    azithromycin 500 mg oral tablet  -- 1 tab(s) by mouth once a day  -- Indication: For Pneumonia due to infectious organism, unspecified laterality, unspecified part of lung    Travatan 0.004% ophthalmic solution  -- 1 drop(s) to each affected eye once a day (in the evening)  -- Indication: For Glaucoma    latanoprost 0.005% ophthalmic solution  -- 1 drop(s) to each affected eye once a day (at bedtime)  -- Indication: For Glaucoma    pantoprazole 40 mg oral delayed release tablet  -- 1 tab(s) by mouth once a day (before a meal)  -- Indication: For GERD

## 2017-04-28 NOTE — PHYSICAL THERAPY INITIAL EVALUATION ADULT - PATIENT/FAMILY/SIGNIFICANT OTHER GOALS STATEMENT, PT EVAL
Pt. stated "I would like to go as soon as they are done with me with all the testing".
To return to his prior level of function

## 2017-04-28 NOTE — PHYSICAL THERAPY INITIAL EVALUATION ADULT - MODALITIES TREATMENT COMMENTS
Pt is on a adsquare P500 low air loss surface. Head to toe skin assessment performed. + palpable DP/PT pulses bilaterally. B/L feet are pink, + blanchable. Dry, flaky skin noted over B/L feet. Myocotic toenails noted. Sacrum assessed. Tissue intact & blanchable. Pt is continent of urine & formed stool. No lesions/wounds noted.

## 2017-04-28 NOTE — PHYSICAL THERAPY INITIAL EVALUATION ADULT - IMPAIRMENTS FOUND, PT EVAL
gait, locomotion, and balance/muscle strength/aerobic capacity/endurance/bed mobility, transfers
no functional impairements noted, patient presented with ability to perform tasks indpenedently and ambulation safely with his cane

## 2017-04-28 NOTE — DISCHARGE NOTE ADULT - PLAN OF CARE
Resolving Complete in 3 more days of azithromycin and ceftriaxone. follow up with ID and pulmonary also Bl heel stage 1  wound care. change position Q2hr, fall precaution. monitor BP, not on any meds. Low salt diet cont on PT/OT

## 2017-04-28 NOTE — PROGRESS NOTE ADULT - SUBJECTIVE AND OBJECTIVE BOX
Vital Signs Last 24 Hrs  T(C): 36.7, Max: 37.7 (04-27 @ 23:15)  T(F): 98, Max: 99.8 (04-27 @ 23:15)  HR: 71 (69 - 80)  BP: 101/56 (101/56 - 133/64)  BP(mean): --  RR: 16 (16 - 18)  SpO2: 100% (98% - 100%)PHYSICAL EXAM:      Constitutional: pt without any complaints , cough improved    Respiratory: clear , no wheezing or ronchi    Cardiovascular:S1 S2 regular, no murmer    Gastrointestinal:soft, BS+      Extremities: ecc neg    MEDICATIONS  (STANDING):  cefTRIAXone   IVPB 1Gram(s) IV Intermittent every 24 hours  heparin  Injectable 5000Unit(s) SubCutaneous every 12 hours  pantoprazole    Tablet 40milliGRAM(s) Oral before breakfast  amLODIPine   Tablet 10milliGRAM(s) Oral daily  azithromycin   Tablet 500milliGRAM(s) Oral daily    MEDICATIONS  (PRN):  guaiFENesin   Syrup  (Sugar-Free) 200milliGRAM(s) Oral every 6 hours PRN Cough  04-27    138  |  102  |  28<H>  ----------------------------<  90  3.9   |  24  |  1.12    Ca    8.7      27 Apr 2017 07:18    CBC Full  -  ( 27 Apr 2017 07:18 )  WBC Count : 12.1 K/uL  Hemoglobin : 11.6 g/dL  Hematocrit : 34.0 %  Platelet Count - Automated : 422 K/uL  Mean Cell Volume : 89.8 fl  Mean Cell Hemoglobin : 30.6 pg  Mean Cell Hemoglobin Concentration : 34.0 gm/dL  Auto Neutrophil # : 8.9 K/uL  Auto Lymphocyte # : 2.0 K/uL  Auto Monocyte # : 1.0 K/uL  Auto Eosinophil # : 0.1 K/uL  Auto Basophil # : 0.1 K/uL  Auto Neutrophil % : 73.8 %  Auto Lymphocyte % : 17.0 %  Auto Monocyte % : 8.1 %  Auto Eosinophil % : 0.5 %  Auto Basophil % : 0.6 %

## 2017-04-28 NOTE — PROGRESS NOTE ADULT - SUBJECTIVE AND OBJECTIVE BOX
Patient is a 89y old  Male who presents with a chief complaint of Increasing weakness over several weeks, cough today. (25 Apr 2017 02:28)      OVERNIGHT EVENTS: No overnight event     REVIEW OF SYSTEMS: denies chest pain/SOB, diaphoresis, no F/C, cough, dizziness, headache, blurry vision, nausea, vomiting, abdominal pain. Rest unremarkable     MEDICATIONS  (STANDING):  cefTRIAXone   IVPB 1Gram(s) IV Intermittent every 24 hours  heparin  Injectable 5000Unit(s) SubCutaneous every 12 hours  pantoprazole    Tablet 40milliGRAM(s) Oral before breakfast  amLODIPine   Tablet 10milliGRAM(s) Oral daily  azithromycin   Tablet 500milliGRAM(s) Oral daily    MEDICATIONS  (PRN):  guaiFENesin   Syrup  (Sugar-Free) 200milliGRAM(s) Oral every 6 hours PRN Cough      Allergies    No Known Allergies    Intolerances        T(F): 97.2, Max: 99.8 (04-27 @ 23:15)  HR: 80 (69 - 88)  BP: 133/64 (124/62 - 150/69)  RR: 18 (16 - 18)  SpO2: 98% (98% - 99%)  Wt(kg): --    PHYSICAL EXAM:  GENERAL: NAD, well-groomed, well-developed, elderly male   HEAD:  Atraumatic, Normocephalic  EYES: EOMI, PERRLA, conjunctiva and sclera clear  ENMT: No tonsillar erythema, exudates, or enlargement; Moist mucous membranes, Good dentition, No lesions  NECK: Supple, No JVD, Normal thyroid  NERVOUS SYSTEM:  awake, Alert, Good concentration; Motor Strength 5/5 B/L upper and lower extremities; DTRs 2+ intact and symmetric  CHEST/LUNG: Clear to percussion bilaterally; No rales, rhonchi, wheezing, or rubs BL  HEART: Regular rate and rhythm; No murmurs, rubs, or gallops  ABDOMEN: Soft, Nontender, Nondistended; Bowel sounds present  EXTREMITIES:  2+ Peripheral Pulses, No clubbing, cyanosis, or edema BL LE  LYMPH: No lymphadenopathy noted  SKIN: R cheek mole, stage 1 sacral and BL heel decubiti POA    LABS:                        11.6   12.1  )-----------( 422      ( 27 Apr 2017 07:18 )             34.0     04-27    138  |  102  |  28<H>  ----------------------------<  90  3.9   |  24  |  1.12    Ca    8.7      27 Apr 2017 07:18          Cultures;   CAPILLARY BLOOD GLUCOSE    Lipid panel:       RADIOLOGY & ADDITIONAL TESTS:    Imaging Personally Reviewed:  [x ] YES      Consultant(s) Notes Reviewed:  [x ] YES     Care Discussed with [x ] Consultants [X ] Patient [x ] Family  [x ]    [x ]  Other; RN

## 2017-04-28 NOTE — PHYSICAL THERAPY INITIAL EVALUATION ADULT - ADDITIONAL COMMENTS
Pt has his regular cane with him during the encounter. Pt. uses it at home and outdoors, he stated that he still drives his car.
Pertinent history cont: CT chest, abdomen, & pelvis: bibasilar lower lobe pulmonary opacities likely reflect atelectasis although some element of aspiration is possible otherwise no acute findings in the chest, abdomen, or pelvis. CT head: no acute intracranial pathology. Pulmonology & infectious disease consults appreciated.    Pt lives alone in a private single story home, no entry steps. Prior to admission pt was independent with all functional mobility including community ambulation without a device. Pt owns straight cane & rolling walker but states he didn't regularly use them. Pt is right hand dominant, drives, & is a retired electronic . Goal of therapy: return to his prior level of function.

## 2017-04-28 NOTE — PHYSICAL THERAPY INITIAL EVALUATION ADULT - CRITERIA FOR SKILLED THERAPEUTIC INTERVENTIONS
functional limitations in following categories/impairments found/rehab potential/risk reduction/prevention
risk reduction/prevention/anticipated discharge recommendation

## 2017-04-28 NOTE — DISCHARGE NOTE ADULT - PATIENT PORTAL LINK FT
“You can access the [facility] Patient Portal, offered by [facility], by registering with the following website: [email or web address]”

## 2017-04-28 NOTE — PHYSICAL THERAPY INITIAL EVALUATION ADULT - ASR EQUIP NEEDS DISCH PT EVAL
pt owns straight cane & rolling walker
Pt has his own cane during encounter, he also mentioned that he has a rolling walker at home.

## 2017-04-28 NOTE — PHYSICAL THERAPY INITIAL EVALUATION ADULT - NAME OF CLINICIAN
Dr. Urban made aware of d/c plan (d/c to home when cleared but asked to refer to SW)
Kristina GONZALEZ

## 2017-04-28 NOTE — PHYSICAL THERAPY INITIAL EVALUATION ADULT - MODIFIED CLINICAL TEST OF SENSORY INTEGRATION IN BALANCE TEST
Barthel Index: Feeding Score _10/10__, Bathing Score _0/5__, Grooming Score _0/5__, Dressing Score _5/10__, Bowels Score __10/10_, Bladder Score _10/10__, Toilet Score _5/10__, Transfers Score _10/15__, Mobility Score __0/15_, Stairs Score _0/10__,     Total Score ___50/100
Barthels score: 90/100

## 2017-04-28 NOTE — PROGRESS NOTE ADULT - ASSESSMENT
89 y/o hypertensive man with weakness and cough, also has sore throat with leukocytosis. Possible viral syndrome +/- pneumonia. Rapid flu and respiratory viral panel neg. CXR-Resolution of congestive changes noted previously. Residual opacity left lung base. CT imaging of the chest recommended. Status post CABG. Blood cx- no growth. TTE- EF - 55 to 60%. Blood cx- No growth to date. STARTED ON azithromycin and ceftriaxone. Seen by ID. Rot CXR 4/28/17 - Residual left retrocardiac opacities. Afebrile, feeling well.

## 2017-04-28 NOTE — PHYSICAL THERAPY INITIAL EVALUATION ADULT - DIAGNOSIS, PT EVAL
Pt. presented with no functional deficits, he was able to move independently with basically set up and with his cane, however he does it slow typical for individual his age.
M62.81

## 2017-04-28 NOTE — PROGRESS NOTE ADULT - SUBJECTIVE AND OBJECTIVE BOX
Patient is a 89y old  Male who presents with a chief complaint of Increasing weakness over several weeks, cough today. (28 Apr 2017 12:34)      INTERVAL HPI / OVERNIGHT EVENTS: doing ok ,has some cough,sleepy    MEDICATIONS  (STANDING):  cefTRIAXone   IVPB 1Gram(s) IV Intermittent every 24 hours  heparin  Injectable 5000Unit(s) SubCutaneous every 12 hours  pantoprazole    Tablet 40milliGRAM(s) Oral before breakfast  amLODIPine   Tablet 10milliGRAM(s) Oral daily  azithromycin   Tablet 500milliGRAM(s) Oral daily    MEDICATIONS  (PRN):  guaiFENesin   Syrup  (Sugar-Free) 200milliGRAM(s) Oral every 6 hours PRN Cough      Vital Signs Last 24 Hrs  T(C): 36.2, Max: 37.7 (04-27 @ 23:15)  T(F): 97.2, Max: 99.8 (04-27 @ 23:15)  HR: 80 (69 - 88)  BP: 133/64 (124/62 - 150/69)  BP(mean): --  RR: 18 (16 - 18)  SpO2: 98% (98% - 99%)    PHYSICAL EXAM:  General :NAD  Constitutional:  well-groomed, well-developed  Respiratory: CTAB/L  Cardiovascular: S1 and S2, RRR, no M/G/R  Gastrointestinal: BS+, soft, NT/ND  Extremities: No peripheral edema  Vascular: 2+ peripheral pulses  Skin: No rashes      LABS:                        11.6   12.1  )-----------( 422      ( 27 Apr 2017 07:18 )             34.0     04-27    138  |  102  |  28<H>  ----------------------------<  90  3.9   |  24  |  1.12    Ca    8.7      27 Apr 2017 07:18            MICROBIOLOGY:  RECENT CULTURES:  04-25 .Blood Blood-Venous XXXX XXXX   No growth to date.          RADIOLOGY & ADDITIONAL STUDIES:

## 2017-04-28 NOTE — DISCHARGE NOTE ADULT - HOSPITAL COURSE
89 y/o hypertensive man with weakness and cough, also has sore throat with leukocytosis. Possible viral syndrome +/- pneumonia. Rapid flu and respiratory viral panel neg. CXR-Resolution of congestive changes noted previously. Residual opacity left lung base. CT imaging of the chest recommended. Status post CABG. Blood cx- no growth. TTE- EF - 55 to 60%. Blood cx- No growth to date. Started on azithromycin and ceftriaxone, complete in 3 more days. Seen by ID. Rot CXR 4/28/17 - Residual left retrocardiac opacities. Afebrile, feeling well. stable for d/c to [facility] and follow up with ID and pulmonologist. 89 y/o hypertensive man with weakness and cough, also has sore throat with leukocytosis. Possible viral syndrome +/- pneumonia. Rapid flu and respiratory viral panel neg. CXR-Resolution of congestive changes noted previously. Residual opacity left lung base. CT imaging of the chest recommended. Status post CABG. Blood cx- no growth. TTE- EF - 55 to 60%. Blood cx- No growth to date. Started on azithromycin and ceftriaxone, complete in 3 more days. Seen by ID. Rot CXR 4/28/17 - Residual left retrocardiac opacities. Noted with hematuria with urinary urgency. Afebrile, feeling well. stable for d/c to [facility] and follow up with ID and pulmonologist.

## 2017-04-28 NOTE — DISCHARGE NOTE ADULT - CARE PLAN
Principal Discharge DX:	Pneumonia of left lung due to infectious organism, unspecified part of lung  Goal:	Resolving  Instructions for follow-up, activity and diet:	Complete in 3 more days of azithromycin and ceftriaxone. follow up with ID and pulmonary  Secondary Diagnosis:	Decubitus ulcer of sacral region, stage 1  Instructions for follow-up, activity and diet:	also Bl heel stage 1  wound care. change position Q2hr, fall precaution.  Secondary Diagnosis:	Essential hypertension  Instructions for follow-up, activity and diet:	monitor BP, not on any meds. Low salt diet  Secondary Diagnosis:	Unsteady gait  Instructions for follow-up, activity and diet:	cont on PT/OT

## 2017-04-28 NOTE — PHYSICAL THERAPY INITIAL EVALUATION ADULT - GAIT DEVIATIONS NOTED, PT EVAL
decreased weight-shifting ability/decreased step length/decreased roberto/decreased stride length
decreased step length/increased time in double stance

## 2017-04-28 NOTE — PHYSICAL THERAPY INITIAL EVALUATION ADULT - PLANNED THERAPY INTERVENTIONS, PT EVAL
strengthening/gait training/balance training/transfer training/bed mobility training
no further skilled intervention needed. Pt is independent

## 2017-04-29 LAB
ANION GAP SERPL CALC-SCNC: 11 MMOL/L — SIGNIFICANT CHANGE UP (ref 5–17)
BASOPHILS # BLD AUTO: 0.1 K/UL — SIGNIFICANT CHANGE UP (ref 0–0.2)
BASOPHILS NFR BLD AUTO: 0.6 % — SIGNIFICANT CHANGE UP (ref 0–2)
BUN SERPL-MCNC: 27 MG/DL — HIGH (ref 7–23)
CALCIUM SERPL-MCNC: 8.6 MG/DL — SIGNIFICANT CHANGE UP (ref 8.5–10.1)
CHLORIDE SERPL-SCNC: 101 MMOL/L — SIGNIFICANT CHANGE UP (ref 96–108)
CO2 SERPL-SCNC: 24 MMOL/L — SIGNIFICANT CHANGE UP (ref 22–31)
CREAT SERPL-MCNC: 0.99 MG/DL — SIGNIFICANT CHANGE UP (ref 0.5–1.3)
EOSINOPHIL # BLD AUTO: 0.1 K/UL — SIGNIFICANT CHANGE UP (ref 0–0.5)
EOSINOPHIL NFR BLD AUTO: 0.8 % — SIGNIFICANT CHANGE UP (ref 0–6)
GLUCOSE SERPL-MCNC: 84 MG/DL — SIGNIFICANT CHANGE UP (ref 70–99)
HCT VFR BLD CALC: 33.2 % — LOW (ref 39–50)
HGB BLD-MCNC: 11.3 G/DL — LOW (ref 13–17)
LYMPHOCYTES # BLD AUTO: 1.7 K/UL — SIGNIFICANT CHANGE UP (ref 1–3.3)
LYMPHOCYTES # BLD AUTO: 14.5 % — SIGNIFICANT CHANGE UP (ref 13–44)
MCHC RBC-ENTMCNC: 30.5 PG — SIGNIFICANT CHANGE UP (ref 27–34)
MCHC RBC-ENTMCNC: 33.9 GM/DL — SIGNIFICANT CHANGE UP (ref 32–36)
MCV RBC AUTO: 90 FL — SIGNIFICANT CHANGE UP (ref 80–100)
MONOCYTES # BLD AUTO: 0.9 K/UL — SIGNIFICANT CHANGE UP (ref 0–0.9)
MONOCYTES NFR BLD AUTO: 7.3 % — SIGNIFICANT CHANGE UP (ref 2–14)
NEUTROPHILS # BLD AUTO: 9 K/UL — HIGH (ref 1.8–7.4)
NEUTROPHILS NFR BLD AUTO: 76.8 % — SIGNIFICANT CHANGE UP (ref 43–77)
PLATELET # BLD AUTO: 372 K/UL — SIGNIFICANT CHANGE UP (ref 150–400)
POTASSIUM SERPL-MCNC: 3.9 MMOL/L — SIGNIFICANT CHANGE UP (ref 3.5–5.3)
POTASSIUM SERPL-SCNC: 3.9 MMOL/L — SIGNIFICANT CHANGE UP (ref 3.5–5.3)
RBC # BLD: 3.69 M/UL — LOW (ref 4.2–5.8)
RBC # FLD: 13.9 % — SIGNIFICANT CHANGE UP (ref 11–15)
SODIUM SERPL-SCNC: 136 MMOL/L — SIGNIFICANT CHANGE UP (ref 135–145)
WBC # BLD: 11.7 K/UL — HIGH (ref 3.8–10.5)
WBC # FLD AUTO: 11.7 K/UL — HIGH (ref 3.8–10.5)

## 2017-04-29 PROCEDURE — 99232 SBSQ HOSP IP/OBS MODERATE 35: CPT

## 2017-04-29 RX ADMIN — CEFTRIAXONE 100 GRAM(S): 500 INJECTION, POWDER, FOR SOLUTION INTRAMUSCULAR; INTRAVENOUS at 17:53

## 2017-04-29 RX ADMIN — AMLODIPINE BESYLATE 10 MILLIGRAM(S): 2.5 TABLET ORAL at 05:21

## 2017-04-29 RX ADMIN — PANTOPRAZOLE SODIUM 40 MILLIGRAM(S): 20 TABLET, DELAYED RELEASE ORAL at 06:29

## 2017-04-29 RX ADMIN — HEPARIN SODIUM 5000 UNIT(S): 5000 INJECTION INTRAVENOUS; SUBCUTANEOUS at 05:21

## 2017-04-29 RX ADMIN — AZITHROMYCIN 500 MILLIGRAM(S): 500 TABLET, FILM COATED ORAL at 11:34

## 2017-04-29 RX ADMIN — LATANOPROST 1 DROP(S): 0.05 SOLUTION/ DROPS OPHTHALMIC; TOPICAL at 22:27

## 2017-04-29 RX ADMIN — HEPARIN SODIUM 5000 UNIT(S): 5000 INJECTION INTRAVENOUS; SUBCUTANEOUS at 17:53

## 2017-04-29 NOTE — CHART NOTE - NSCHARTNOTEFT_GEN_A_CORE
patient seen admitted with PNA  and being treated for community acquired. PMD is a  relative in the community who has called pulmonary consult. labs ordered for am will be followed tomorrow by the 2C team
Case discuss with Pt's family Dr. Atwood. Explained at length POC. awaiting ID and Pulmonary eval
Medicine Hospitalist PA    Was asked by RN to see pt for hematuria. Pt seen and examined at bedside. Pt laying comfortably in bed. Pt states he used the urinal and noted discoloration. Admits to some urgency, however thinks its the delay in reaching the urinal. No abd pain, states he feels fine. Pt states he ready to be discharged and doesn't want hematuria to delay the process. Explained to pt the need to work up the bleeding for pt safety. Denies dysuria, frequency, abdominal pain, change in bowel movements, cp, sob, N/V.      VS:  BP: 151/69  HR: 87  T: 99 oral O2: 95 RA    General: alert, awake and oriented x 3  CV: S1 S2  Resp: good air entry b/l  Abd: soft, NT/ND, BS+  Ext: no edema, pulses intact  Neuro: grossly intact, good finger grasp, no arm drift  Skin: R cheek mole, stage 1 sacral and BL heel decubiti POA    A/P: 89 YOM admitted with PNA on Azithro/Rocephin now with asymptomatic hematuria.  - Discontinue heparin SubQ  - IPC  - F/u UA/UC  - F/u CBC/BMP  - Continue to monitor

## 2017-04-29 NOTE — PROGRESS NOTE ADULT - ASSESSMENT
89 y/o hypertensive man with weakness and cough, also has sore throat with leukocytosis. Possible viral syndrome +/- pneumonia. Rapid flu and respiratory viral panel neg. CXR-Resolution of congestive changes noted previously. Residual opacity left lung base. CT imaging of the chest recommended. Status post CABG. Blood cx- no growth. TTE- EF - 55 to 60%. Blood cx- No growth to date. STARTED ON azithromycin and ceftriaxone. Seen by ID. Rot CXR 4/28/17 - Residual left retrocardiac opacities. Afebrile, feeling well.

## 2017-04-29 NOTE — PROGRESS NOTE ADULT - SUBJECTIVE AND OBJECTIVE BOX
Patient is a 89y old  Male who presents with a chief complaint of Increasing weakness over several weeks, cough today. (28 Apr 2017 12:34)      OVERNIGHT EVENTS: none    REVIEW OF SYSTEMS: denies chest pain/SOB, diaphoresis, no F/C, cough, dizziness, headache, blurry vision, nausea, vomiting, abdominal pain. Rest unremarkable     MEDICATIONS  (STANDING):  cefTRIAXone   IVPB 1Gram(s) IV Intermittent every 24 hours  heparin  Injectable 5000Unit(s) SubCutaneous every 12 hours  pantoprazole    Tablet 40milliGRAM(s) Oral before breakfast  amLODIPine   Tablet 10milliGRAM(s) Oral daily  azithromycin   Tablet 500milliGRAM(s) Oral daily  latanoprost 0.005% Ophthalmic Solution 1Drop(s) Both EYES at bedtime    MEDICATIONS  (PRN):  guaiFENesin   Syrup  (Sugar-Free) 200milliGRAM(s) Oral every 6 hours PRN Cough      Allergies    No Known Allergies    Intolerances        T(F): 98.8, Max: 98.8 (04-29 @ 05:18)  HR: 87 (71 - 87)  BP: 135/68 (101/56 - 135/68)  RR: 16 (16 - 18)  SpO2: 97% (97% - 100%)  Wt(kg): --    PHYSICAL EXAM:  GENERAL: NAD, well-groomed, well-developed, elderly male   HEAD:  Atraumatic, Normocephalic  EYES: EOMI, PERRLA, conjunctiva and sclera clear  ENMT: No tonsillar erythema, exudates, or enlargement; Moist mucous membranes, Good dentition, No lesions  NECK: Supple, No JVD, Normal thyroid  NERVOUS SYSTEM:  awake, Alert, Good concentration; Motor Strength 5/5 B/L upper and lower extremities; DTRs 2+ intact and symmetric  CHEST/LUNG: Clear to percussion bilaterally; No rales, rhonchi, wheezing, or rubs BL  HEART: Regular rate and rhythm; No murmurs, rubs, or gallops  ABDOMEN: Soft, Nontender, Nondistended; Bowel sounds present  EXTREMITIES:  2+ Peripheral Pulses, No clubbing, cyanosis, or edema BL LE  LYMPH: No lymphadenopathy noted  SKIN: R cheek mole, stage 1 sacral and BL heel decubiti POA    LABS:                        11.3   11.7  )-----------( 372      ( 29 Apr 2017 08:53 )             33.2     04-29    136  |  101  |  27<H>  ----------------------------<  84  3.9   |  24  |  0.99    Ca    8.6      29 Apr 2017 08:53          Cultures;   CAPILLARY BLOOD GLUCOSE    Lipid panel:       RADIOLOGY & ADDITIONAL TESTS:    Imaging Personally Reviewed:  [x ] YES      Consultant(s) Notes Reviewed:  [x ] YES     Care Discussed with [x ] Consultants [X ] Patient [ ] Family  [x ]    [x ]  Other; RN

## 2017-04-30 DIAGNOSIS — N30.01 ACUTE CYSTITIS WITH HEMATURIA: ICD-10-CM

## 2017-04-30 LAB
ANION GAP SERPL CALC-SCNC: 10 MMOL/L — SIGNIFICANT CHANGE UP (ref 5–17)
APPEARANCE UR: ABNORMAL
BASOPHILS # BLD AUTO: 0 K/UL — SIGNIFICANT CHANGE UP (ref 0–0.2)
BASOPHILS NFR BLD AUTO: 0.2 % — SIGNIFICANT CHANGE UP (ref 0–2)
BILIRUB UR-MCNC: NEGATIVE — SIGNIFICANT CHANGE UP
BUN SERPL-MCNC: 28 MG/DL — HIGH (ref 7–23)
CALCIUM SERPL-MCNC: 8.2 MG/DL — LOW (ref 8.5–10.1)
CHLORIDE SERPL-SCNC: 101 MMOL/L — SIGNIFICANT CHANGE UP (ref 96–108)
CO2 SERPL-SCNC: 25 MMOL/L — SIGNIFICANT CHANGE UP (ref 22–31)
COLOR SPEC: ABNORMAL
CREAT SERPL-MCNC: 1.03 MG/DL — SIGNIFICANT CHANGE UP (ref 0.5–1.3)
CULTURE RESULTS: SIGNIFICANT CHANGE UP
CULTURE RESULTS: SIGNIFICANT CHANGE UP
DIFF PNL FLD: ABNORMAL
EOSINOPHIL # BLD AUTO: 0.1 K/UL — SIGNIFICANT CHANGE UP (ref 0–0.5)
EOSINOPHIL NFR BLD AUTO: 0.8 % — SIGNIFICANT CHANGE UP (ref 0–6)
GLUCOSE SERPL-MCNC: 94 MG/DL — SIGNIFICANT CHANGE UP (ref 70–99)
GLUCOSE UR QL: NEGATIVE MG/DL — SIGNIFICANT CHANGE UP
HCT VFR BLD CALC: 31.8 % — LOW (ref 39–50)
HGB BLD-MCNC: 11 G/DL — LOW (ref 13–17)
KETONES UR-MCNC: NEGATIVE — SIGNIFICANT CHANGE UP
LEUKOCYTE ESTERASE UR-ACNC: ABNORMAL
LYMPHOCYTES # BLD AUTO: 1.8 K/UL — SIGNIFICANT CHANGE UP (ref 1–3.3)
LYMPHOCYTES # BLD AUTO: 17.2 % — SIGNIFICANT CHANGE UP (ref 13–44)
MCHC RBC-ENTMCNC: 30.9 PG — SIGNIFICANT CHANGE UP (ref 27–34)
MCHC RBC-ENTMCNC: 34.8 GM/DL — SIGNIFICANT CHANGE UP (ref 32–36)
MCV RBC AUTO: 88.9 FL — SIGNIFICANT CHANGE UP (ref 80–100)
MONOCYTES # BLD AUTO: 0.9 K/UL — SIGNIFICANT CHANGE UP (ref 0–0.9)
MONOCYTES NFR BLD AUTO: 9 % — SIGNIFICANT CHANGE UP (ref 2–14)
NEUTROPHILS # BLD AUTO: 7.7 K/UL — HIGH (ref 1.8–7.4)
NEUTROPHILS NFR BLD AUTO: 71.8 % — SIGNIFICANT CHANGE UP (ref 43–77)
NITRITE UR-MCNC: NEGATIVE — SIGNIFICANT CHANGE UP
PH UR: 5 — SIGNIFICANT CHANGE UP (ref 5–8)
PLATELET # BLD AUTO: 338 K/UL — SIGNIFICANT CHANGE UP (ref 150–400)
POTASSIUM SERPL-MCNC: 3.8 MMOL/L — SIGNIFICANT CHANGE UP (ref 3.5–5.3)
POTASSIUM SERPL-SCNC: 3.8 MMOL/L — SIGNIFICANT CHANGE UP (ref 3.5–5.3)
PROCALCITONIN SERPL-MCNC: 0.43 NG/ML — HIGH (ref 0–0.05)
PROT UR-MCNC: 100 MG/DL
RBC # BLD: 3.58 M/UL — LOW (ref 4.2–5.8)
RBC # FLD: 13.6 % — SIGNIFICANT CHANGE UP (ref 11–15)
SODIUM SERPL-SCNC: 136 MMOL/L — SIGNIFICANT CHANGE UP (ref 135–145)
SP GR SPEC: 1.02 — SIGNIFICANT CHANGE UP (ref 1.01–1.02)
SPECIMEN SOURCE: SIGNIFICANT CHANGE UP
SPECIMEN SOURCE: SIGNIFICANT CHANGE UP
UROBILINOGEN FLD QL: NEGATIVE MG/DL — SIGNIFICANT CHANGE UP
WBC # BLD: 10.7 K/UL — HIGH (ref 3.8–10.5)
WBC # FLD AUTO: 10.7 K/UL — HIGH (ref 3.8–10.5)

## 2017-04-30 PROCEDURE — 99233 SBSQ HOSP IP/OBS HIGH 50: CPT

## 2017-04-30 RX ORDER — SODIUM CHLORIDE 9 MG/ML
1000 INJECTION INTRAMUSCULAR; INTRAVENOUS; SUBCUTANEOUS
Qty: 0 | Refills: 0 | Status: DISCONTINUED | OUTPATIENT
Start: 2017-04-30 | End: 2017-05-02

## 2017-04-30 RX ADMIN — AMLODIPINE BESYLATE 10 MILLIGRAM(S): 2.5 TABLET ORAL at 06:03

## 2017-04-30 RX ADMIN — LATANOPROST 1 DROP(S): 0.05 SOLUTION/ DROPS OPHTHALMIC; TOPICAL at 21:15

## 2017-04-30 RX ADMIN — CEFTRIAXONE 100 GRAM(S): 500 INJECTION, POWDER, FOR SOLUTION INTRAMUSCULAR; INTRAVENOUS at 17:03

## 2017-04-30 RX ADMIN — AZITHROMYCIN 500 MILLIGRAM(S): 500 TABLET, FILM COATED ORAL at 11:06

## 2017-04-30 RX ADMIN — SODIUM CHLORIDE 75 MILLILITER(S): 9 INJECTION INTRAMUSCULAR; INTRAVENOUS; SUBCUTANEOUS at 12:11

## 2017-04-30 RX ADMIN — PANTOPRAZOLE SODIUM 40 MILLIGRAM(S): 20 TABLET, DELAYED RELEASE ORAL at 06:03

## 2017-04-30 NOTE — PROGRESS NOTE ADULT - SUBJECTIVE AND OBJECTIVE BOX
Patient is a 89y old  Male who presents with a chief complaint of Increasing weakness over several weeks, cough today. (2017 12:34)      OVERNIGHT EVENTS: had episode of smoky color urine. UA c/w cyril blood and RBC. Pt denies any dysuria, urinary frequency, but some urgency    REVIEW OF SYSTEMS: denies chest pain/SOB, diaphoresis, no F/C, cough, dizziness, headache, blurry vision, nausea, vomiting, abdominal pain. Rest unremarkable     MEDICATIONS  (STANDING):  cefTRIAXone   IVPB 1Gram(s) IV Intermittent every 24 hours  pantoprazole    Tablet 40milliGRAM(s) Oral before breakfast  amLODIPine   Tablet 10milliGRAM(s) Oral daily  azithromycin   Tablet 500milliGRAM(s) Oral daily  latanoprost 0.005% Ophthalmic Solution 1Drop(s) Both EYES at bedtime    MEDICATIONS  (PRN):  guaiFENesin   Syrup  (Sugar-Free) 200milliGRAM(s) Oral every 6 hours PRN Cough      Allergies    No Known Allergies    Intolerances        T(F): 70, Max: 99 (-29 @ 23:28)  HR: 70 (70 - 87)  BP: 120/61 (120/61 - 151/69)  RR: 18 (16 - 18)  SpO2: 97% (94% - 97%)  Wt(kg): --    PHYSICAL EXAM:  GENERAL: NAD, well-groomed, well-developed, elderly male   HEAD:  Atraumatic, Normocephalic  EYES: EOMI, PERRLA, conjunctiva and sclera clear  ENMT: No tonsillar erythema, exudates, or enlargement; Moist mucous membranes, Good dentition, No lesions  NECK: Supple, No JVD, Normal thyroid  NERVOUS SYSTEM:  awake, Alert, Good concentration; Motor Strength 5/5 B/L upper and lower extremities; DTRs 2+ intact and symmetric  CHEST/LUNG: Clear to percussion bilaterally; No rales, rhonchi, wheezing, or rubs BL  HEART: Regular rate and rhythm; No murmurs, rubs, or gallops  ABDOMEN: Soft, Nontender, Nondistended; Bowel sounds present  EXTREMITIES:  2+ Peripheral Pulses, No clubbing, cyanosis, or edema BL LE  LYMPH: No lymphadenopathy noted  SKIN: R cheek mole, stage 1 sacral and BL heel decubiti POA    LABS:                        11.0   10.7  )-----------( 338      ( 2017 07:00 )             31.8     04-30    136  |  101  |  28<H>  ----------------------------<  94  3.8   |  25  |  1.03    Ca    8.2<L>      2017 07:00        Urinalysis Basic - ( 2017 00:11 )    Color: Brown / Appearance: very cloudy / S.020 / pH: x  Gluc: x / Ketone: Negative  / Bili: Negative / Urobili: Negative mg/dL   Blood: x / Protein: 100 mg/dL / Nitrite: Negative   Leuk Esterase: Trace / RBC: 11-25 /HPF / WBC 0-2   Sq Epi: x / Non Sq Epi: Occasional / Bacteria: Occasional      Cultures;   CAPILLARY BLOOD GLUCOSE    Lipid panel:       RADIOLOGY & ADDITIONAL TESTS:    Imaging Personally Reviewed:  [x ] YES      Consultant(s) Notes Reviewed:  [x ] YES     Care Discussed with [x ] Consultants [X ] Patient [ ] Family  [x ]    [x ]  Other; RN

## 2017-05-01 PROCEDURE — 99233 SBSQ HOSP IP/OBS HIGH 50: CPT

## 2017-05-01 RX ADMIN — CEFTRIAXONE 100 GRAM(S): 500 INJECTION, POWDER, FOR SOLUTION INTRAMUSCULAR; INTRAVENOUS at 17:33

## 2017-05-01 RX ADMIN — PANTOPRAZOLE SODIUM 40 MILLIGRAM(S): 20 TABLET, DELAYED RELEASE ORAL at 05:55

## 2017-05-01 RX ADMIN — LATANOPROST 1 DROP(S): 0.05 SOLUTION/ DROPS OPHTHALMIC; TOPICAL at 22:17

## 2017-05-01 RX ADMIN — AZITHROMYCIN 500 MILLIGRAM(S): 500 TABLET, FILM COATED ORAL at 12:12

## 2017-05-01 RX ADMIN — SODIUM CHLORIDE 75 MILLILITER(S): 9 INJECTION INTRAMUSCULAR; INTRAVENOUS; SUBCUTANEOUS at 17:32

## 2017-05-01 RX ADMIN — Medication 200 MILLIGRAM(S): at 12:13

## 2017-05-01 RX ADMIN — AMLODIPINE BESYLATE 10 MILLIGRAM(S): 2.5 TABLET ORAL at 05:55

## 2017-05-01 NOTE — PROGRESS NOTE ADULT - SUBJECTIVE AND OBJECTIVE BOX
Patient is a 89y old  Male who presents with a chief complaint of Increasing weakness over several weeks, cough today. (2017 12:34)      INTERVAL HPI / OVERNIGHT EVENTS:    MEDICATIONS  (STANDING):  cefTRIAXone   IVPB 1Gram(s) IV Intermittent every 24 hours  pantoprazole    Tablet 40milliGRAM(s) Oral before breakfast  amLODIPine   Tablet 10milliGRAM(s) Oral daily  azithromycin   Tablet 500milliGRAM(s) Oral daily  latanoprost 0.005% Ophthalmic Solution 1Drop(s) Both EYES at bedtime  sodium chloride 0.9%. 1000milliLiter(s) IV Continuous <Continuous>    MEDICATIONS  (PRN):  guaiFENesin   Syrup  (Sugar-Free) 200milliGRAM(s) Oral every 6 hours PRN Cough      Vital Signs Last 24 Hrs  T(C): 36.7, Max: 37.7 ( @ 23:55)  T(F): 98, Max: 99.8 ( @ 23:55)  HR: 79 (71 - 79)  BP: 124/54 (124/54 - 125/66)  BP(mean): --  RR: 16 (16 - 16)  SpO2: 99% (95% - 99%)    PHYSICAL EXAM:  General :NAD  Constitutional:  well-groomed, well-developed  Respiratory: CTAB/L  Cardiovascular: S1 and S2, RRR, no M/G/R  Gastrointestinal: BS+, soft, NT/ND  Extremities: No peripheral edema  Vascular: 2+ peripheral pulses  Skin: No rashes      LABS:                        11.1   11.6  )-----------( 358      ( 01 May 2017 08:03 )             33.0     05-    137  |  103  |  27<H>  ----------------------------<  88  3.9   |  24  |  0.96    Ca    8.1<L>      01 May 2017 08:03      Urinalysis Basic - ( 2017 00:11 )    Color: Brown / Appearance: very cloudy / S.020 / pH: x  Gluc: x / Ketone: Negative  / Bili: Negative / Urobili: Negative mg/dL   Blood: x / Protein: 100 mg/dL / Nitrite: Negative   Leuk Esterase: Trace / RBC: 11-25 /HPF / WBC 0-2   Sq Epi: x / Non Sq Epi: Occasional / Bacteria: Occasional          MICROBIOLOGY:  RECENT CULTURES:   .Urine Clean Catch (Midstream) XXXX XXXX   No growth     .Blood Blood-Venous XXXX XXXX   No growth at 5 days.          RADIOLOGY & ADDITIONAL STUDIES: Patient is a 89y old  Male who presents with a chief complaint of Increasing weakness over several weeks, cough today. (2017 12:34)      INTERVAL HPI / OVERNIGHT EVENTS: doing ok ,cough better    MEDICATIONS  (STANDING):  cefTRIAXone   IVPB 1Gram(s) IV Intermittent every 24 hours  pantoprazole    Tablet 40milliGRAM(s) Oral before breakfast  amLODIPine   Tablet 10milliGRAM(s) Oral daily  azithromycin   Tablet 500milliGRAM(s) Oral daily  latanoprost 0.005% Ophthalmic Solution 1Drop(s) Both EYES at bedtime  sodium chloride 0.9%. 1000milliLiter(s) IV Continuous <Continuous>    MEDICATIONS  (PRN):  guaiFENesin   Syrup  (Sugar-Free) 200milliGRAM(s) Oral every 6 hours PRN Cough      Vital Signs Last 24 Hrs  T(C): 36.7, Max: 37.7 ( @ 23:55)  T(F): 98, Max: 99.8 ( @ 23:55)  HR: 79 (71 - 79)  BP: 124/54 (124/54 - 125/66)  BP(mean): --  RR: 16 (16 - 16)  SpO2: 99% (95% - 99%)    PHYSICAL EXAM:  General :NAD  Constitutional:  well-groomed, well-developed  Respiratory: CTAB/L  Cardiovascular: S1 and S2, RRR, no M/G/R  Gastrointestinal: BS+, soft, NT/ND  Extremities: No peripheral edema  Vascular: 2+ peripheral pulses  Skin: No rashes      LABS:  PCT ) .43<--0.19                        11.1   11.6  )-----------( 358      ( 01 May 2017 08:03 )             33.0     05-    137  |  103  |  27<H>  ----------------------------<  88  3.9   |  24  |  0.96    Ca    8.1<L>      01 May 2017 08:03      Urinalysis Basic - ( 2017 00:11 )    Color: Brown / Appearance: very cloudy / S.020 / pH: x  Gluc: x / Ketone: Negative  / Bili: Negative / Urobili: Negative mg/dL   Blood: x / Protein: 100 mg/dL / Nitrite: Negative   Leuk Esterase: Trace / RBC: 11-25 /HPF / WBC 0-2   Sq Epi: x / Non Sq Epi: Occasional / Bacteria: Occasional          MICROBIOLOGY:  RECENT CULTURES:   .Urine Clean Catch (Midstream) XXXX XXXX   No growth     .Blood Blood-Venous XXXX XXXX   No growth at 5 days.          RADIOLOGY & ADDITIONAL STUDIES:

## 2017-05-01 NOTE — PROGRESS NOTE ADULT - SUBJECTIVE AND OBJECTIVE BOX
CHIEF COMPLAINT/INTERVAL HISTORY:    Patient is a 89y old  Male who presents with a chief complaint of Increasing weakness over several weeks, cough today. (2017 12:34)      HPI:  89 y/o man  PMH HTN CABG, AVR brought by EMS from grocery store c/o generalized "weakness" for last two weeks. Has had sore throat for 2 days, and non productive cough today, mild headache  but no chest pain, SOB, palpitations, wheezing, fever or chills. No sick contacts or travel. Patient denies fall, dizziness, vertigo, abdominal pain, chest pain, diplopia, speech difficulty, numbness, limb weakness. Patient states that he feels stronger since being in ED. Pt had flu shot . (2017 02:28)    Overnight issues  no further hematuria   awaiting skilled nursing facility   SUBJECTIVE & OBJECTIVE: Pt seen and examined at bedside.   ROS:  CONSTITUTIONAL: No fever, weight loss, or fatigue  NECK: No pain or stiffness  RESPIRATORY:mild cough nohills or hemoptysis; No shortness of breath  CARDIOVASCULAR: No chest pain, palpitations, dizziness, or leg swelling  GASTROINTESTINAL: No abdominal or epigastric pain. No nausea, vomiting, or hematemesis; No diarrhea or constipation. No melena or hematochezia.  GENITOURINARY: No dysuria, frequency, hematuria, or incontinence  NEUROLOGICAL: No headaches, memory loss, loss of strength, numbness, or tremors  SKIN: No itching, burning, rashes, or lesions   ICU Vital Signs Last 24 Hrs  T(C): 37.7, Max: 37.7 (04-30 @ 23:55)  T(F): 99.8, Max: 99.8 (04-30 @ 23:55)  HR: 71 (71 - 75)  BP: 124/67 (124/67 - 129/71)  BP(mean): --  ABP: --  ABP(mean): --  RR: 16 (16 - 16)  SpO2: 99% (95% - 99%)        MEDICATIONS  (STANDING):  cefTRIAXone   IVPB 1Gram(s) IV Intermittent every 24 hours  pantoprazole    Tablet 40milliGRAM(s) Oral before breakfast  amLODIPine   Tablet 10milliGRAM(s) Oral daily  azithromycin   Tablet 500milliGRAM(s) Oral daily  latanoprost 0.005% Ophthalmic Solution 1Drop(s) Both EYES at bedtime  sodium chloride 0.9%. 1000milliLiter(s) IV Continuous <Continuous>    MEDICATIONS  (PRN):  guaiFENesin   Syrup  (Sugar-Free) 200milliGRAM(s) Oral every 6 hours PRN Cough        PHYSICAL EXAM:    GENERAL: NAD, well-groomed, well-developed  HEAD:  Atraumatic, Normocephalic  EYES: EOMI, PERRLA, conjunctiva and sclera clear  ENMT: Moist mucous membranes  NECK: Supple, No JVD  NERVOUS SYSTEM:  Alert & Oriented X3, Motor Strength 5/5 B/L upper and lower extremities; DTRs 2+ intact and symmetric  CHEST/LUNG: Clear to auscultation bilaterally; No rales, rhonchi, wheezing, or rubs  HEART: Regular rate and rhythm; No murmurs, rubs, or gallops  ABDOMEN: Soft, Nontender, Nondistended; Bowel sounds present  EXTREMITIES:  2+ Peripheral Pulses, No clubbing, cyanosis, or edema    LABS:                        11.1   11.6  )-----------( 358      ( 01 May 2017 08:03 )             33.0     05-01    137  |  103  |  27<H>  ----------------------------<  88  3.9   |  24  |  0.96    Ca    8.1<L>      01 May 2017 08:03        Urinalysis Basic - ( 2017 00:11 )    Color: Brown / Appearance: very cloudy / S.020 / pH: x  Gluc: x / Ketone: Negative  / Bili: Negative / Urobili: Negative mg/dL   Blood: x / Protein: 100 mg/dL / Nitrite: Negative   Leuk Esterase: Trace / RBC: 11-25 /HPF / WBC 0-2   Sq Epi: x / Non Sq Epi: Occasional / Bacteria: Occasional        CAPILLARY BLOOD GLUCOSE      RECENT CULTURES:      RADIOLOGY & ADDITIONAL TESTS:  Imaging Personally Reviewed:  [ ] YES      Consultant(s) Notes Reviewed:  [ ] YES     Care Discussed with [ ] Consultants [X ] Patient [ ] Family  [x ]    [x ]  Other; RN  HEALTH ISSUES - PROBLEM Dx:  Hematuria due to acute cystitis: Hematuria due to acute cystitis  Pneumonia of left lung due to infectious organism, unspecified part of lung: Pneumonia of left lung due to infectious organism, unspecified part of lung  Decubitus ulcer of sacral region, stage 1: Decubitus ulcer of sacral region, stage 1  Secondary hypertension: Secondary hypertension  Leukocytosis, unspecified type: Leukocytosis, unspecified type  Unsteady gait: Unsteady gait  Essential hypertension: Essential hypertension  Weakness: Weakness  Pneumonia due to infectious organism, unspecified laterality, unspecified part of lung: Pneumonia due to infectious organism, unspecified laterality, unspecified part of lung        DVT/GI ppx  Discussed with pt @ bedside

## 2017-05-01 NOTE — PROGRESS NOTE ADULT - SUBJECTIVE AND OBJECTIVE BOX
denies sob , cough improving but feels weak     vss afebrile  nc/at no jvd lungs clear to ausc. cor s1s2 abd soft bs+ ext no cce , neuro no fnd  ct chest and cxr reviewed lt. retrocardiac pna improving

## 2017-05-02 VITALS
OXYGEN SATURATION: 99 % | DIASTOLIC BLOOD PRESSURE: 63 MMHG | SYSTOLIC BLOOD PRESSURE: 135 MMHG | HEART RATE: 83 BPM | TEMPERATURE: 98 F | RESPIRATION RATE: 17 BRPM

## 2017-05-02 PROCEDURE — 99239 HOSP IP/OBS DSCHRG MGMT >30: CPT

## 2017-05-02 PROCEDURE — 99233 SBSQ HOSP IP/OBS HIGH 50: CPT

## 2017-05-02 RX ORDER — LATANOPROST 0.05 MG/ML
1 SOLUTION/ DROPS OPHTHALMIC; TOPICAL
Qty: 0 | Refills: 0 | COMMUNITY
Start: 2017-05-02

## 2017-05-02 RX ADMIN — SODIUM CHLORIDE 75 MILLILITER(S): 9 INJECTION INTRAMUSCULAR; INTRAVENOUS; SUBCUTANEOUS at 06:38

## 2017-05-02 RX ADMIN — AZITHROMYCIN 500 MILLIGRAM(S): 500 TABLET, FILM COATED ORAL at 11:46

## 2017-05-02 RX ADMIN — PANTOPRAZOLE SODIUM 40 MILLIGRAM(S): 20 TABLET, DELAYED RELEASE ORAL at 06:38

## 2017-05-02 RX ADMIN — AMLODIPINE BESYLATE 10 MILLIGRAM(S): 2.5 TABLET ORAL at 06:38

## 2017-05-02 RX ADMIN — Medication 200 MILLIGRAM(S): at 06:38

## 2017-05-02 RX ADMIN — CEFTRIAXONE 100 GRAM(S): 500 INJECTION, POWDER, FOR SOLUTION INTRAMUSCULAR; INTRAVENOUS at 17:45

## 2017-05-02 NOTE — DIETITIAN INITIAL EVALUATION ADULT. - PERTINENT LABORATORY DATA
05-01 Na137 mmol/L Glu 88 mg/dL K+ 3.9 mmol/L Cr  0.96 mg/dL BUN 27 mg/dL<H> Phos n/a   Alb n/a   PAB n/a

## 2017-05-02 NOTE — PROGRESS NOTE ADULT - PROBLEM SELECTOR PROBLEM 2
Leukocytosis, unspecified type
Essential hypertension
Hematuria due to acute cystitis
Leukocytosis, unspecified type

## 2017-05-02 NOTE — PROGRESS NOTE ADULT - PROBLEM SELECTOR PROBLEM 4
Secondary hypertension
Decubitus ulcer of sacral region, stage 1
Secondary hypertension
Unsteady gait

## 2017-05-02 NOTE — PROGRESS NOTE ADULT - PROBLEM SELECTOR PROBLEM 3
Weakness
Essential hypertension
Unsteady gait
Weakness

## 2017-05-02 NOTE — PROGRESS NOTE ADULT - SUBJECTIVE AND OBJECTIVE BOX
Patient is a 89y old  Male who presents with a chief complaint of Increasing weakness over several weeks, cough today. (2017 12:34)      INTERVAL HPI / OVERNIGHT EVENTS: doing ok ,cough better    MEDICATIONS  (STANDING):  cefTRIAXone   IVPB 1Gram(s) IV Intermittent every 24 hours  pantoprazole    Tablet 40milliGRAM(s) Oral before breakfast  amLODIPine   Tablet 10milliGRAM(s) Oral daily  azithromycin   Tablet 500milliGRAM(s) Oral daily  latanoprost 0.005% Ophthalmic Solution 1Drop(s) Both EYES at bedtime  sodium chloride 0.9%. 1000milliLiter(s) IV Continuous <Continuous>    MEDICATIONS  (PRN):  guaiFENesin   Syrup  (Sugar-Free) 200milliGRAM(s) Oral every 6 hours PRN Cough      Vital Signs Last 24 Hrs  Tmax : afebrile  HR: 79 (71 - 79)  BP: 124/54 (124/54 - 125/66)  BP(mean): --  RR: 16 (16 - 16)  SpO2: 99% (95% - 99%)    PHYSICAL EXAM:  General :NAD  Constitutional:  well-groomed, well-developed  Respiratory: CTAB/L  Cardiovascular: S1 and S2, RRR, no M/G/R  Gastrointestinal: BS+, soft, NT/ND  Extremities: No peripheral edema  Vascular: 2+ peripheral pulses  Skin: No rashes      LABS:  PCT  .43<--0.19               Urinalysis Basic - ( 2017 00:11 )    Color: Brown / Appearance: very cloudy / S.020 / pH: x  Gluc: x / Ketone: Negative  / Bili: Negative / Urobili: Negative mg/dL   Blood: x / Protein: 100 mg/dL / Nitrite: Negative   Leuk Esterase: Trace / RBC: 11-25 /HPF / WBC 0-2   Sq Epi: x / Non Sq Epi: Occasional / Bacteria: Occasional          MICROBIOLOGY:  RECENT CULTURES:   .Urine Clean Catch (Midstream) XXXX XXXX   No growth     .Blood Blood-Venous XXXX XXXX   No growth at 5 days.          RADIOLOGY & ADDITIONAL STUDIES:

## 2017-05-02 NOTE — PROGRESS NOTE ADULT - PROBLEM SELECTOR PLAN 1
CXR shows improvemnet   residual LLL infiltrate   pt feeling better   change antibiotics to PO in 1-2 days   change to Vantin 200 mg PO BID- complete total10 day course of antibiotics  discharge planning
pt clinically improved  denies any cough   complete 7 day course of antibiotics   discharge planning
-follow up blood cultures  -Cont on IV fluids, antibiotics with azithromycin and cefTRIAXone  -ID and pulmonary consult pending  -monitor vitals and pulse oxymetry
-follow up blood cultures  -Cont on IV fluids, antibiotics with azithromycin and ceftriaxone 3/5 days  -ID and pulmonary consult pending  -monitor vitals and pulse oxymetry
-follow up blood cultures  -Cont on IV fluids, antibiotics with azithromycin and ceftriaxone 4/5 days  -ID and pulmonary are following up  -monitor vitals and pulse oxymetry
-follow up blood cultures  -Cont on IV fluids, antibiotics with azithromycin and ceftriaxone 5/5 days  -ID and pulmonary are following up  -monitor vitals and pulse oxymetry  -check am labs including procalcitonin
-follow up blood cultures  -Cont on IV fluids, antibiotics with azithromycin and ceftriaxone 5/5 days  -ID and pulmonary are following up  -monitor vitals and pulse oxymetry  -check am labs including procalcitonin
Community acquired PNA  cont rocephin /azithro (day 7)-d.c antibiotics in am   Procalcitonin 0.29-->0.19-->0.43  but pt clinically doing better
Community acquired PNA  cont rocephin /azithro for 3 more days  Procalcitonin 0.29-->0.19
Community acquired PNA  d/c antibiotics today    Procalcitonin 0.29-->0.19-->0.43 but pt clinically doing better  but pt clinically doing better
blood cultures-neg  -Cont on IV fluids, antibiotics with azithromycin and ceftriaxone 5/5 days  -ID and pulmonary are following up  -monitor vitals and pulse oxymetry  -check am labs including procalcitonin
blood cultures-neg  -Cont on IV fluids, antibiotics with azithromycin and ceftriaxone 5/5 days  -ID and pulmonary are following up  stable for discharge pending skilled nursing facility bed   -monitor vitals and pulse oxymetry  -check am labs including procalcitonin
change zithromax to PO   continue rocephin   repeat CXR in AM   if CXR ok change rocephin to PO
Community acquired PNA  cont rocehin /azithro for 3 more days  Procalcitonin 0.29-->0.19

## 2017-05-02 NOTE — PROGRESS NOTE ADULT - PROBLEM SELECTOR PLAN 5
-POA and also Bl heel stage 1  -PT/wound care   -change position Q2hr, fall precaution

## 2017-05-02 NOTE — PROGRESS NOTE ADULT - PROBLEM SELECTOR PLAN 4
-POA and also Bl heel stage 1  -PT/wound care   -change position Q2hr, fall precaution
-POA and also Bl heel stage 1  -PT/wound care   -change position Q2hr, fall precaution
-PT eval recs- [facility] awaiting authorization   -[facility] sent, awaiting bed
-POA and also Bl heel stage 1  -PT/wound eval pending   -change position Q2hr, fall precaution

## 2017-05-02 NOTE — PROGRESS NOTE ADULT - PROBLEM SELECTOR PROBLEM 1
Pneumonia due to infectious organism, unspecified laterality, unspecified part of lung
Pneumonia of left lung due to infectious organism, unspecified part of lung
Pneumonia of left lung due to infectious organism, unspecified part of lung
Pneumonia due to infectious organism, unspecified laterality, unspecified part of lung

## 2017-05-02 NOTE — PROGRESS NOTE ADULT - PROVIDER SPECIALTY LIST ADULT
Hospitalist
Infectious Disease
Pulmonology

## 2017-05-02 NOTE — PROGRESS NOTE ADULT - SUBJECTIVE AND OBJECTIVE BOX
Vital Signs Last 24 Hrs  T(C): 36.5, Max: 37.4 (05-01 @ 23:52)  T(F): 97.7, Max: 99.4 (05-01 @ 23:52)  HR: 83 (68 - 83)  BP: 135/63 (110/67 - 135/63)  BP(mean): --  RR: 17 (16 - 18)  SpO2: 99% (94% - 99%)  patient lying in bed in no respiratory distress  HEENT - neck supple , No JVD  lungs - decreased BS, no wheezing , ronchi or rales   COR- S1S 2regular , no murmer  Abd- soft, BS+, no tenderness, no guarding   ext- ecc neg, good pulses  Neuro - A/O X3    MEDICATIONS  (STANDING):  cefTRIAXone   IVPB 1Gram(s) IV Intermittent every 24 hours  pantoprazole    Tablet 40milliGRAM(s) Oral before breakfast  amLODIPine   Tablet 10milliGRAM(s) Oral daily  azithromycin   Tablet 500milliGRAM(s) Oral daily  latanoprost 0.005% Ophthalmic Solution 1Drop(s) Both EYES at bedtime  sodium chloride 0.9%. 1000milliLiter(s) IV Continuous <Continuous>    MEDICATIONS  (PRN):  guaiFENesin   Syrup  (Sugar-Free) 200milliGRAM(s) Oral every 6 hours PRN Cough                        11.1   11.6  )-----------( 358      ( 01 May 2017 08:03 )             33.0   05-01    137  |  103  |  27<H>  ----------------------------<  88  3.9   |  24  |  0.96    Ca    8.1<L>      01 May 2017 08:03

## 2017-05-02 NOTE — PROGRESS NOTE ADULT - PROBLEM SELECTOR PLAN 3
-PT charity recs- [facility]  -[facility] sent, awaiting bed
-PT eval pending
-PT eval pending
-PT eval recs- [facility] awaiting authorization   -[facility] sent, awaiting bed
-monitor BP, not on any meds
needs PT  being transferred to rehab
needs PT

## 2017-05-02 NOTE — PROGRESS NOTE ADULT - PROBLEM SELECTOR PLAN 2
sec to pneumonia   repeat CBC in AM
-Pt is already on Ceftriaxone    urine culture NEGATIVE  -add IVF, check am labs, encourage PO fluids
-Pt is already on Ceftx   urine culture NEGATIVE  -add IVF, check am labs, encourage PO fluids
-Pt is already on Ceftx  -follow up urine culture  -add IVF, check am labs, encourage PO fluids
-monitor BP, not on any meds
mild ,no fever
mild ,no fever
sec to above
sec to above

## 2017-05-02 NOTE — DIETITIAN INITIAL EVALUATION ADULT. - OTHER INFO
89 yo M seen for LOS. Pt reports reduced intake PTA secondary to not feeling well, dislikes the hospital food c reduced intake since admission, states he must have lost "some" weight, unsure of how much. Advised pt to ask for different options that he prefers @ mealtimes. Pt declined PO supplement. States he used to weigh himself and take his BP every day until his son who is an MD told him "at this point, do whatever you want".  Shops & prepares meals for himself. No dietary restrictions. Last BM 5/1

## 2017-05-02 NOTE — PROGRESS NOTE ADULT - PROBLEM SELECTOR PROBLEM 5
Decubitus ulcer of sacral region, stage 1

## 2017-05-02 NOTE — PROGRESS NOTE ADULT - SUBJECTIVE AND OBJECTIVE BOX
CHIEF COMPLAINT/INTERVAL HISTORY:    Patient is a 89y old  Male who presents with a chief complaint of Increasing weakness over several weeks, cough today. (28 Apr 2017 12:34)      HPI:  89 y/o man  PMH HTN CABG, AVR brought by EMS from grocery store c/o generalized "weakness" for last two weeks. Has had sore throat for 2 days, and non productive cough today, mild headache  but no chest pain, SOB, palpitations, wheezing, fever or chills. No sick contacts or travel. Patient denies fall, dizziness, vertigo, abdominal pain, chest pain, diplopia, speech difficulty, numbness, limb weakness. Patient states that he feels stronger since being in ED. Pt had flu shot . (25 Apr 2017 02:28)    Overnight issues  patient t feeling well   waiting on skilled nursing facility placeemnt   SUBJECTIVE & OBJECTIVE: Pt seen and examined at bedside.   ROS:  CONSTITUTIONAL: No fever, weight loss, or fatigue  NECK: No pain or stiffness  RESPIRATORY: No cough, wheezing, chills or hemoptysis; No shortness of breath  CARDIOVASCULAR: No chest pain, palpitations, dizziness, or leg swelling  GASTROINTESTINAL: No abdominal or epigastric pain. No nausea, vomiting, or hematemesis; No diarrhea or constipation. No melena or hematochezia.  GENITOURINARY: No dysuria, frequency, hematuria, or incontinence  NEUROLOGICAL: No headaches, memory loss, loss of strength, numbness, or tremors  SKIN: No itching, burning, rashes, or lesions   ICU Vital Signs Last 24 Hrs  T(C): 36.1, Max: 37.4 (05-01 @ 23:52)  T(F): 97, Max: 99.4 (05-01 @ 23:52)  HR: 69 (68 - 79)  BP: 117/52 (110/67 - 124/54)  BP(mean): --  ABP: --  ABP(mean): --  RR: 18 (16 - 18)  SpO2: 97% (94% - 99%)        MEDICATIONS  (STANDING):  cefTRIAXone   IVPB 1Gram(s) IV Intermittent every 24 hours  pantoprazole    Tablet 40milliGRAM(s) Oral before breakfast  amLODIPine   Tablet 10milliGRAM(s) Oral daily  azithromycin   Tablet 500milliGRAM(s) Oral daily  latanoprost 0.005% Ophthalmic Solution 1Drop(s) Both EYES at bedtime  sodium chloride 0.9%. 1000milliLiter(s) IV Continuous <Continuous>    MEDICATIONS  (PRN):  guaiFENesin   Syrup  (Sugar-Free) 200milliGRAM(s) Oral every 6 hours PRN Cough        PHYSICAL EXAM:    GENERAL: NAD, well-groomed, well-developed  HEAD:  Atraumatic, Normocephalic  EYES: EOMI, PERRLA, conjunctiva and sclera clear  ENMT: Moist mucous membranes  NECK: Supple, No JVD  NERVOUS SYSTEM:  Alert & Oriented X3, Motor Strength 5/5 B/L upper and lower extremities; DTRs 2+ intact and symmetric  CHEST/LUNG: Clear to auscultation bilaterally; No rales, rhonchi, wheezing, or rubs  HEART: Regular rate and rhythm; No murmurs, rubs, or gallops  ABDOMEN: Soft, Nontender, Nondistended; Bowel sounds present  EXTREMITIES:  2+ Peripheral Pulses, No clubbing, cyanosis, or edema    LABS:                        11.1   11.6  )-----------( 358      ( 01 May 2017 08:03 )             33.0     05-01    137  |  103  |  27<H>  ----------------------------<  88  3.9   |  24  |  0.96    Ca    8.1<L>      01 May 2017 08:03            CAPILLARY BLOOD GLUCOSE      RECENT CULTURES:      RADIOLOGY & ADDITIONAL TESTS:  Imaging Personally Reviewed:  [ ] YES      Consultant(s) Notes Reviewed:  [ ] YES     Care Discussed with [ ] Consultants [X ] Patient [ ] Family  [x ]    [x ]  Other; RN  HEALTH ISSUES - PROBLEM Dx:  Hematuria due to acute cystitis: Hematuria due to acute cystitis  Pneumonia of left lung due to infectious organism, unspecified part of lung: Pneumonia of left lung due to infectious organism, unspecified part of lung  Decubitus ulcer of sacral region, stage 1: Decubitus ulcer of sacral region, stage 1  Secondary hypertension: Secondary hypertension  Leukocytosis, unspecified type: Leukocytosis, unspecified type  Unsteady gait: Unsteady gait  Essential hypertension: Essential hypertension  Weakness: Weakness  Pneumonia due to infectious organism, unspecified laterality, unspecified part of lung: Pneumonia due to infectious organism, unspecified laterality, unspecified part of lung        DVT/GI ppx  Discussed with pt @ bedside

## 2017-05-04 DIAGNOSIS — L89.151 PRESSURE ULCER OF SACRAL REGION, STAGE 1: ICD-10-CM

## 2017-05-04 DIAGNOSIS — J18.9 PNEUMONIA, UNSPECIFIED ORGANISM: ICD-10-CM

## 2017-05-04 DIAGNOSIS — R26.81 UNSTEADINESS ON FEET: ICD-10-CM

## 2017-05-04 DIAGNOSIS — Z95.2 PRESENCE OF PROSTHETIC HEART VALVE: ICD-10-CM

## 2017-05-04 DIAGNOSIS — I15.9 SECONDARY HYPERTENSION, UNSPECIFIED: ICD-10-CM

## 2017-05-04 DIAGNOSIS — Z95.1 PRESENCE OF AORTOCORONARY BYPASS GRAFT: ICD-10-CM

## 2017-05-07 ENCOUNTER — OUTPATIENT (OUTPATIENT)
Dept: OUTPATIENT SERVICES | Facility: HOSPITAL | Age: 82
LOS: 1 days | Discharge: HOME | End: 2017-05-07
Payer: COMMERCIAL

## 2017-05-07 DIAGNOSIS — J12.89 OTHER VIRAL PNEUMONIA: ICD-10-CM

## 2017-05-07 DIAGNOSIS — Z95.2 PRESENCE OF PROSTHETIC HEART VALVE: Chronic | ICD-10-CM

## 2017-05-07 DIAGNOSIS — K56.69 OTHER INTESTINAL OBSTRUCTION: Chronic | ICD-10-CM

## 2017-05-07 PROBLEM — I10 ESSENTIAL (PRIMARY) HYPERTENSION: Chronic | Status: ACTIVE | Noted: 2017-04-24

## 2017-05-07 PROBLEM — B02.9 ZOSTER WITHOUT COMPLICATIONS: Chronic | Status: ACTIVE | Noted: 2017-04-24

## 2017-05-07 PROCEDURE — 71010: CPT | Mod: 26

## 2017-09-06 ENCOUNTER — OUTPATIENT (OUTPATIENT)
Dept: OUTPATIENT SERVICES | Facility: HOSPITAL | Age: 82
LOS: 1 days | Discharge: HOME | End: 2017-09-06
Payer: COMMERCIAL

## 2017-09-06 ENCOUNTER — INPATIENT (INPATIENT)
Facility: HOSPITAL | Age: 82
LOS: 11 days | Discharge: NURSING, REHAB OR LONG-TERM CARE | End: 2017-09-18
Attending: INTERNAL MEDICINE | Admitting: INTERNAL MEDICINE
Payer: COMMERCIAL

## 2017-09-06 VITALS
HEART RATE: 66 BPM | OXYGEN SATURATION: 100 % | RESPIRATION RATE: 20 BRPM | DIASTOLIC BLOOD PRESSURE: 60 MMHG | SYSTOLIC BLOOD PRESSURE: 107 MMHG | WEIGHT: 114.64 LBS

## 2017-09-06 DIAGNOSIS — K56.69 OTHER INTESTINAL OBSTRUCTION: Chronic | ICD-10-CM

## 2017-09-06 DIAGNOSIS — J18.9 PNEUMONIA, UNSPECIFIED ORGANISM: ICD-10-CM

## 2017-09-06 DIAGNOSIS — Z95.2 PRESENCE OF PROSTHETIC HEART VALVE: Chronic | ICD-10-CM

## 2017-09-06 LAB
ALBUMIN SERPL ELPH-MCNC: 1.9 G/DL — LOW (ref 3.3–5)
ALP SERPL-CCNC: 95 U/L — SIGNIFICANT CHANGE UP (ref 40–120)
ALT FLD-CCNC: 8 U/L — LOW (ref 12–78)
ANION GAP SERPL CALC-SCNC: 11 MMOL/L — SIGNIFICANT CHANGE UP (ref 5–17)
ANISOCYTOSIS BLD QL: SLIGHT — SIGNIFICANT CHANGE UP
APPEARANCE UR: CLEAR — SIGNIFICANT CHANGE UP
AST SERPL-CCNC: 11 U/L — LOW (ref 15–37)
BILIRUB SERPL-MCNC: 0.4 MG/DL — SIGNIFICANT CHANGE UP (ref 0.2–1.2)
BILIRUB UR-MCNC: NEGATIVE — SIGNIFICANT CHANGE UP
BUN SERPL-MCNC: 18 MG/DL — SIGNIFICANT CHANGE UP (ref 7–23)
CALCIUM SERPL-MCNC: 8.1 MG/DL — LOW (ref 8.5–10.1)
CHLORIDE SERPL-SCNC: 99 MMOL/L — SIGNIFICANT CHANGE UP (ref 96–108)
CK MB CFR SERPL CALC: 0.9 NG/ML — SIGNIFICANT CHANGE UP (ref 0.5–3.6)
CO2 SERPL-SCNC: 26 MMOL/L — SIGNIFICANT CHANGE UP (ref 22–31)
COLOR SPEC: YELLOW — SIGNIFICANT CHANGE UP
CREAT SERPL-MCNC: 0.68 MG/DL — SIGNIFICANT CHANGE UP (ref 0.5–1.3)
DIFF PNL FLD: NEGATIVE — SIGNIFICANT CHANGE UP
EOSINOPHIL NFR BLD AUTO: 1 % — SIGNIFICANT CHANGE UP (ref 0–6)
EPI CELLS # UR: SIGNIFICANT CHANGE UP
GLUCOSE SERPL-MCNC: 79 MG/DL — SIGNIFICANT CHANGE UP (ref 70–99)
GLUCOSE UR QL: NEGATIVE MG/DL — SIGNIFICANT CHANGE UP
HCT VFR BLD CALC: 36.7 % — LOW (ref 39–50)
HGB BLD-MCNC: 11.8 G/DL — LOW (ref 13–17)
HYPOCHROMIA BLD QL: SLIGHT — SIGNIFICANT CHANGE UP
KETONES UR-MCNC: NEGATIVE — SIGNIFICANT CHANGE UP
LEUKOCYTE ESTERASE UR-ACNC: ABNORMAL
LIDOCAIN IGE QN: 71 U/L — LOW (ref 73–393)
LYMPHOCYTES # BLD AUTO: 21 % — SIGNIFICANT CHANGE UP (ref 13–44)
MAGNESIUM SERPL-MCNC: 2.1 MG/DL — SIGNIFICANT CHANGE UP (ref 1.6–2.6)
MCHC RBC-ENTMCNC: 28.4 PG — SIGNIFICANT CHANGE UP (ref 27–34)
MCHC RBC-ENTMCNC: 32.2 GM/DL — SIGNIFICANT CHANGE UP (ref 32–36)
MCV RBC AUTO: 88.2 FL — SIGNIFICANT CHANGE UP (ref 80–100)
MICROCYTES BLD QL: SLIGHT — SIGNIFICANT CHANGE UP
MONOCYTES NFR BLD AUTO: 7 % — SIGNIFICANT CHANGE UP (ref 2–14)
NEUTROPHILS NFR BLD AUTO: 69 % — SIGNIFICANT CHANGE UP (ref 43–77)
NEUTS BAND # BLD: 2 % — SIGNIFICANT CHANGE UP (ref 0–8)
NITRITE UR-MCNC: NEGATIVE — SIGNIFICANT CHANGE UP
NT-PROBNP SERPL-SCNC: 3750 PG/ML — HIGH (ref 0–450)
PH UR: 6.5 — SIGNIFICANT CHANGE UP (ref 5–8)
PLAT MORPH BLD: NORMAL — SIGNIFICANT CHANGE UP
PLATELET # BLD AUTO: 251 K/UL — SIGNIFICANT CHANGE UP (ref 150–400)
POTASSIUM SERPL-MCNC: 3.5 MMOL/L — SIGNIFICANT CHANGE UP (ref 3.5–5.3)
POTASSIUM SERPL-SCNC: 3.5 MMOL/L — SIGNIFICANT CHANGE UP (ref 3.5–5.3)
PROT SERPL-MCNC: 5.7 GM/DL — LOW (ref 6–8.3)
PROT UR-MCNC: NEGATIVE MG/DL — SIGNIFICANT CHANGE UP
RBC # BLD: 4.16 M/UL — LOW (ref 4.2–5.8)
RBC # FLD: 15.7 % — HIGH (ref 11–15)
RBC BLD AUTO: ABNORMAL
SODIUM SERPL-SCNC: 136 MMOL/L — SIGNIFICANT CHANGE UP (ref 135–145)
SP GR SPEC: 1.01 — SIGNIFICANT CHANGE UP (ref 1.01–1.02)
TROPONIN I SERPL-MCNC: <.015 NG/ML — SIGNIFICANT CHANGE UP (ref 0.01–0.04)
UROBILINOGEN FLD QL: 4 MG/DL
WBC # BLD: 10.3 K/UL — SIGNIFICANT CHANGE UP (ref 3.8–10.5)
WBC # FLD AUTO: 10.3 K/UL — SIGNIFICANT CHANGE UP (ref 3.8–10.5)
WBC UR QL: SIGNIFICANT CHANGE UP

## 2017-09-06 PROCEDURE — 71010: CPT | Mod: 26

## 2017-09-06 PROCEDURE — 99285 EMERGENCY DEPT VISIT HI MDM: CPT

## 2017-09-06 RX ORDER — PIPERACILLIN AND TAZOBACTAM 4; .5 G/20ML; G/20ML
4000 INJECTION, POWDER, LYOPHILIZED, FOR SOLUTION INTRAVENOUS ONCE
Qty: 0 | Refills: 0 | Status: DISCONTINUED | OUTPATIENT
Start: 2017-09-06 | End: 2017-09-06

## 2017-09-06 RX ORDER — VANCOMYCIN HCL 1 G
750 VIAL (EA) INTRAVENOUS EVERY 12 HOURS
Qty: 0 | Refills: 0 | Status: DISCONTINUED | OUTPATIENT
Start: 2017-09-06 | End: 2017-09-07

## 2017-09-06 RX ORDER — PIPERACILLIN AND TAZOBACTAM 4; .5 G/20ML; G/20ML
3.38 INJECTION, POWDER, LYOPHILIZED, FOR SOLUTION INTRAVENOUS ONCE
Qty: 0 | Refills: 0 | Status: COMPLETED | OUTPATIENT
Start: 2017-09-06 | End: 2017-09-06

## 2017-09-06 RX ORDER — PIPERACILLIN AND TAZOBACTAM 4; .5 G/20ML; G/20ML
3.38 INJECTION, POWDER, LYOPHILIZED, FOR SOLUTION INTRAVENOUS EVERY 8 HOURS
Qty: 0 | Refills: 0 | Status: DISCONTINUED | OUTPATIENT
Start: 2017-09-06 | End: 2017-09-07

## 2017-09-06 RX ORDER — LATANOPROST 0.05 MG/ML
1 SOLUTION/ DROPS OPHTHALMIC; TOPICAL AT BEDTIME
Qty: 0 | Refills: 0 | Status: DISCONTINUED | OUTPATIENT
Start: 2017-09-06 | End: 2017-09-18

## 2017-09-06 RX ORDER — FUROSEMIDE 40 MG
40 TABLET ORAL ONCE
Qty: 0 | Refills: 0 | Status: COMPLETED | OUTPATIENT
Start: 2017-09-06 | End: 2017-09-06

## 2017-09-06 RX ORDER — VANCOMYCIN HCL 1 G
1000 VIAL (EA) INTRAVENOUS ONCE
Qty: 0 | Refills: 0 | Status: DISCONTINUED | OUTPATIENT
Start: 2017-09-06 | End: 2017-09-07

## 2017-09-06 RX ADMIN — Medication 150 MILLIGRAM(S): at 22:39

## 2017-09-06 RX ADMIN — PIPERACILLIN AND TAZOBACTAM 200 GRAM(S): 4; .5 INJECTION, POWDER, LYOPHILIZED, FOR SOLUTION INTRAVENOUS at 18:06

## 2017-09-06 RX ADMIN — LATANOPROST 1 DROP(S): 0.05 SOLUTION/ DROPS OPHTHALMIC; TOPICAL at 22:39

## 2017-09-06 NOTE — ED ADULT NURSE NOTE - OBJECTIVE STATEMENT
89 yo male presents to fast track from [facility] c/o weakness, pt nonverbal, responds to verbal, pmh  CAD, HLD, HYpothyroidism, HTN, "Afib, BPH, NKDA

## 2017-09-06 NOTE — ED PROVIDER NOTE - OBJECTIVE STATEMENT
Pt is a 89 yo gentleman with a pmhx of CHF, CABG, who presents to the ED with decreased alertness coming from [facility]. He is at baseline responsive and interactive per nephew, but over the past day or so has been less responsive and just staring ahead and not eating. No reported fevers, patient is not speaking currently. Is DNR/DNI.

## 2017-09-06 NOTE — ED PROVIDER NOTE - PHYSICAL EXAMINATION
Gen: Eyes closed, but will squeeze tightly and moan when attempt to open them  HEENT: Atraumatic, normocephalic  Eyes: Pupils reactive, 2 mm bilaterally  Card: RRR, no murmurs, rubs, or gallops  Resp: Has diminished breath sounds, has crackles in bilateral lower lobes  GI: Nondistended, nontender  Ext: nonedematous  Neuro: Groans, but is otherwise nonresponsive.

## 2017-09-06 NOTE — ED ADULT TRIAGE NOTE - CHIEF COMPLAINT QUOTE
patient here from [facility] , as per nurse , patient here for weakness started 2 days ago , patient nonverbal , reacting to verbal stimuli

## 2017-09-06 NOTE — ED PROVIDER NOTE - CARE PLAN
Principal Discharge DX:	Altered mental status, unspecified altered mental status type  Secondary Diagnosis:	Pneumonia of both lower lobes due to infectious organism

## 2017-09-06 NOTE — ED ADULT NURSE NOTE - CHIEF COMPLAINT QUOTE
patient here from [facility] , as per nurse , patient here for weakness started 2 days ago , patient nonverbal , reacting to verbal stimuli

## 2017-09-07 DIAGNOSIS — R41.82 ALTERED MENTAL STATUS, UNSPECIFIED: ICD-10-CM

## 2017-09-07 DIAGNOSIS — J90 PLEURAL EFFUSION, NOT ELSEWHERE CLASSIFIED: ICD-10-CM

## 2017-09-07 DIAGNOSIS — J18.9 PNEUMONIA, UNSPECIFIED ORGANISM: ICD-10-CM

## 2017-09-07 DIAGNOSIS — I10 ESSENTIAL (PRIMARY) HYPERTENSION: ICD-10-CM

## 2017-09-07 LAB
ANION GAP SERPL CALC-SCNC: 9 MMOL/L — SIGNIFICANT CHANGE UP (ref 5–17)
BUN SERPL-MCNC: 17 MG/DL — SIGNIFICANT CHANGE UP (ref 7–23)
CALCIUM SERPL-MCNC: 7.9 MG/DL — LOW (ref 8.5–10.1)
CHLORIDE SERPL-SCNC: 100 MMOL/L — SIGNIFICANT CHANGE UP (ref 96–108)
CO2 SERPL-SCNC: 28 MMOL/L — SIGNIFICANT CHANGE UP (ref 22–31)
CREAT SERPL-MCNC: 0.6 MG/DL — SIGNIFICANT CHANGE UP (ref 0.5–1.3)
CULTURE RESULTS: NO GROWTH — SIGNIFICANT CHANGE UP
GLUCOSE SERPL-MCNC: 75 MG/DL — SIGNIFICANT CHANGE UP (ref 70–99)
POTASSIUM SERPL-MCNC: 3.3 MMOL/L — LOW (ref 3.5–5.3)
POTASSIUM SERPL-SCNC: 3.3 MMOL/L — LOW (ref 3.5–5.3)
PROCALCITONIN SERPL-MCNC: 0.16 NG/ML — HIGH (ref 0–0.04)
SODIUM SERPL-SCNC: 137 MMOL/L — SIGNIFICANT CHANGE UP (ref 135–145)
SPECIMEN SOURCE: SIGNIFICANT CHANGE UP

## 2017-09-07 PROCEDURE — 99497 ADVNCD CARE PLAN 30 MIN: CPT

## 2017-09-07 PROCEDURE — 99498 ADVNCD CARE PLAN ADDL 30 MIN: CPT

## 2017-09-07 RX ORDER — POTASSIUM CHLORIDE 20 MEQ
10 PACKET (EA) ORAL
Qty: 0 | Refills: 0 | Status: COMPLETED | OUTPATIENT
Start: 2017-09-07 | End: 2017-09-07

## 2017-09-07 RX ADMIN — Medication 100 MILLIEQUIVALENT(S): at 12:24

## 2017-09-07 RX ADMIN — Medication 100 MILLIEQUIVALENT(S): at 12:25

## 2017-09-07 RX ADMIN — LATANOPROST 1 DROP(S): 0.05 SOLUTION/ DROPS OPHTHALMIC; TOPICAL at 21:28

## 2017-09-07 RX ADMIN — PIPERACILLIN AND TAZOBACTAM 25 GRAM(S): 4; .5 INJECTION, POWDER, LYOPHILIZED, FOR SOLUTION INTRAVENOUS at 03:09

## 2017-09-07 NOTE — SWALLOW BEDSIDE ASSESSMENT ADULT - SPECIFY REASON(S)
MD order states history of dysphagia. CNA reported pt hold the food in his mouth during breakfast this AM MD order states history of dysphagia. CNA reported pt hold the food in his mouth during breakfast this AM. Sanjuanita Díaz NP called SLP as pt with improved alertness and family asking for Ensure.

## 2017-09-07 NOTE — GOALS OF CARE CONVERSATION - PERSONAL ADVANCE DIRECTIVE - WHAT MATTERS MOST
As per HCP/ Nephew Dr. Lakhwinder garcia, only want comfort measures. DNR/ DNI. No antibiotics, tube feeding. Hospice care. if possible in patient hospice.

## 2017-09-07 NOTE — SWALLOW BEDSIDE ASSESSMENT ADULT - COMMENTS
CXR9/6/2017MPRESSION:Bilateral pulmonary infiltrates with bilateral pleural effusions and cardiomegaly.

## 2017-09-07 NOTE — SWALLOW BEDSIDE ASSESSMENT ADULT - SWALLOW EVAL: ORAL MUSCULATURE
unable to assess due to poor participation/comprehension/generally intact/overall reduced ROM/strength, and slow speed

## 2017-09-07 NOTE — PROGRESS NOTE ADULT - SUBJECTIVE AND OBJECTIVE BOX
HPI:  · HPI  Pt is a 89 yo gentleman with a pmhx of CHF, CABG, who presents to the ED with decreased alertness coming from [facility]. He is at baseline responsive and interactive per nephew, but over the past day or so has been less responsive and just staring ahead and not eating. No reported fevers, patient is not speaking currently. Is DNR/DNI.  Admitted for pneumonia (07 Sep 2017 10:33)      PAST MEDICAL & SURGICAL HISTORY:  Shingles  SBO (small bowel obstruction)  HTN (hypertension)  S/P AVR (aortic valve replacement)  SBO (small bowel obstruction)      SOCHX:   tobacco,  -  alcohol    FMHX: FA/MO  - contributory       Recent Travel:    Immunizations:    Allergies    No Known Allergies    Intolerances        MEDICATIONS  (STANDING):  vancomycin  IVPB 1000 milliGRAM(s) IV Intermittent once  latanoprost 0.005% Ophthalmic Solution 1 Drop(s) Both EYES at bedtime  piperacillin/tazobactam IVPB. 3.375 Gram(s) IV Intermittent every 8 hours  vancomycin  IVPB 750 milliGRAM(s) IV Intermittent every 12 hours    MEDICATIONS  (PRN):      REVIEW OF SYSTEMS:  CONSTITUTIONAL: No fever, weight loss, or fatigue  EYES: No eye pain, visual disturbances, or discharge  ENMT:  No difficulty hearing, tinnitus, vertigo; No sinus or throat pain  NECK: No pain or stiffness  BREASTS: No pain, masses, or nipple discharge  RESPIRATORY: No cough, wheezing, chills or hemoptysis; No shortness of breath  CARDIOVASCULAR: No chest pain, palpitations, dizziness, or leg swelling  GASTROINTESTINAL: No abdominal or epigastric pain. No nausea, vomiting, or hematemesis; No diarrhea or constipation. No melena or hematochezia.  GENITOURINARY: No dysuria, frequency, hematuria, or incontinence  NEUROLOGICAL: No headaches, memory loss, loss of strength, numbness, or tremors  SKIN: No itching, burning, rashes, or lesions   LYMPH NODES: No enlarged glands  ENDOCRINE: No heat or cold intolerance; No hair loss  MUSCULOSKELETAL: No joint pain or swelling; No muscle, back, or extremity pain  PSYCHIATRIC: No depression, anxiety, mood swings, or difficulty sleeping  HEME/LYMPH: No easy bruising, or bleeding gums  ALLERGY AND IMMUNOLOGIC: No hives or eczema    VITAL SIGNS:    T(C): 36.6 (17 @ 12:48), Max: 37.2 (17 @ 06:00)  T(F): 97.8 (17 @ 12:48), Max: 98.9 (17 @ 06:00)  HR: 76 (17 @ 12:48) (72 - 90)  BP: 112/68 (17 @ 12:48) (112/68 - 134/67)  RR: 17 (17 @ 12:48) (16 - 20)  SpO2: 99% (17 @ 12:48) (99% - 100%)    PHYSICAL EXAM:    GENERAL: NAD, well-groomed, well-developed  HEAD:  Atraumatic, Normocephalic  EYES: EOMI, PERRLA, conjunctiva and sclera clear  ENMT: No tonsillar erythema, exudates, or enlargement; Moist mucous membranes, Good dentition, No lesions  NECK: Supple, No JVD, Normal thyroid  NERVOUS SYSTEM:  Alert & Oriented X3, Good concentration; Motor Strength 5/5 B/L upper and lower extremities; DTRs 2+ intact and symmetric  CHEST/LUNG: Clear to percussion bilaterally; No rales, rhonchi, wheezing bilaterally  HEART: Regular rate and rhythm; No murmurs, rubs, or gallops  ABDOMEN: Soft, Nontender, Nondistended; Bowel sounds present  EXTREMITIES:  2+ Peripheral Pulses, No clubbing, cyanosis, or edema  LYMPH: No lymphadenopathy noted  SKIN: No rashes or lesions  BACK: no pressor sore     LABS:                         11.8   10.3  )-----------( 251      ( 06 Sep 2017 14:21 )             36.7         137  |  100  |  17  ----------------------------<  75  3.3<L>   |  28  |  0.60    Ca    7.9<L>      07 Sep 2017 06:35  Mg     2.1         TPro  5.7<L>  /  Alb  1.9<L>  /  TBili  0.4  /  DBili  x   /  AST  11<L>  /  ALT  8<L>  /  AlkPhos  95      LIVER FUNCTIONS - ( 06 Sep 2017 14:20 )  Alb: 1.9 g/dL / Pro: 5.7 gm/dL / ALK PHOS: 95 U/L / ALT: 8 U/L / AST: 11 U/L / GGT: x             Urinalysis Basic - ( 06 Sep 2017 14:27 )    Color: Yellow / Appearance: Clear / S.010 / pH: x  Gluc: x / Ketone: Negative  / Bili: Negative / Urobili: 4 mg/dL   Blood: x / Protein: Negative mg/dL / Nitrite: Negative   Leuk Esterase: Trace / RBC: x / WBC 0-2   Sq Epi: x / Non Sq Epi: x / Bacteria: x        CARDIAC MARKERS ( 06 Sep 2017 14:20 )  <.015 ng/mL / x     / x     / x     / 0.9 ng/mL                                  Radiology: Urinalysis Basic - ( 06 Sep 2017 14:27 )    Color: Yellow / Appearance: Clear / S.010 / pH: x  Gluc: x / Ketone: Negative  / Bili: Negative / Urobili: 4 mg/dL   Blood: x / Protein: Negative mg/dL / Nitrite: Negative   Leuk Esterase: Trace / RBC: x / WBC 0-2   Sq Epi: x / Non Sq Epi: x / Bacteria: x        CARDIAC MARKERS ( 06 Sep 2017 14:20 )  <.015 ng/mL / x     / x     / x     / 0.9 ng/mL                                  Radiology:

## 2017-09-07 NOTE — SWALLOW BEDSIDE ASSESSMENT ADULT - SLP PERTINENT HISTORY OF CURRENT PROBLEM
Pt is a 89 yo gentleman with a pmhx of CHF, CABG, who presents to the ED with decreased alertness coming from [facility]. He is at baseline responsive and interactive per nephew, but over the past day or so has been less responsive and just staring ahead and not eating. No reported fevers, patient is not speaking currently. Is DNR/DNI.

## 2017-09-07 NOTE — CONSULT NOTE ADULT - ASSESSMENT
[facility]   antibiotics   see orders seen and discussed with nephew by bedside   will follow with you thanks

## 2017-09-07 NOTE — CONSULT NOTE ADULT - SUBJECTIVE AND OBJECTIVE BOX
HPI:  · HPI  Pt is a 89 yo gentleman with a pmhx of CHF, CABG, who presents to the ED with decreased alertness coming from [facility]. He is at baseline responsive and interactive per nephew, but over the past day or so has been less responsive and just staring ahead and not eating. No reported fevers, patient is not speaking currently. Is DNR/DNI.  Admitted for pneumonia (07 Sep 2017 10:33)      PAST MEDICAL & SURGICAL HISTORY:  Shingles  SBO (small bowel obstruction)  HTN (hypertension)  S/P AVR (aortic valve replacement)  SBO (small bowel obstruction)      SOCHX:   tobacco,  -  alcohol    FMHX: FA/MO  - contributory       Recent Travel:    Immunizations:    Allergies    No Known Allergies    Intolerances        MEDICATIONS  (STANDING):  vancomycin  IVPB 1000 milliGRAM(s) IV Intermittent once  latanoprost 0.005% Ophthalmic Solution 1 Drop(s) Both EYES at bedtime  piperacillin/tazobactam IVPB. 3.375 Gram(s) IV Intermittent every 8 hours  vancomycin  IVPB 750 milliGRAM(s) IV Intermittent every 12 hours    MEDICATIONS  (PRN):      REVIEW OF SYSTEMS:  CONSTITUTIONAL: No fever, weight loss, or fatigue  EYES: No eye pain, visual disturbances, or discharge  ENMT:  No difficulty hearing, tinnitus, vertigo; No sinus or throat pain  NECK: No pain or stiffness  BREASTS: No pain, masses, or nipple discharge  RESPIRATORY: No cough, wheezing, chills or hemoptysis; No shortness of breath  CARDIOVASCULAR: No chest pain, palpitations, dizziness, or leg swelling  GASTROINTESTINAL: No abdominal or epigastric pain. No nausea, vomiting, or hematemesis; No diarrhea or constipation. No melena or hematochezia.  GENITOURINARY: No dysuria, frequency, hematuria, or incontinence  NEUROLOGICAL: No headaches, memory loss, loss of strength, numbness, or tremors  SKIN: No itching, burning, rashes, or lesions   LYMPH NODES: No enlarged glands  ENDOCRINE: No heat or cold intolerance; No hair loss  MUSCULOSKELETAL: No joint pain or swelling; No muscle, back, or extremity pain  PSYCHIATRIC: No depression, anxiety, mood swings, or difficulty sleeping  HEME/LYMPH: No easy bruising, or bleeding gums  ALLERGY AND IMMUNOLOGIC: No hives or eczema    VITAL SIGNS:    T(C): 36.6 (17 @ 12:48), Max: 37.2 (17 @ 06:00)  T(F): 97.8 (17 @ 12:48), Max: 98.9 (17 @ 06:00)  HR: 76 (17 @ 12:48) (72 - 90)  BP: 112/68 (17 @ 12:48) (112/68 - 134/67)  RR: 17 (17 @ 12:48) (16 - 20)  SpO2: 99% (17 @ 12:48) (99% - 100%)    PHYSICAL EXAM:    GENERAL: NAD, well-groomed, well-developed  HEAD:  Atraumatic, Normocephalic  EYES: EOMI, PERRLA, conjunctiva and sclera clear  ENMT: No tonsillar erythema, exudates, or enlargement; Moist mucous membranes, Good dentition, No lesions  NECK: Supple, No JVD, Normal thyroid  NERVOUS SYSTEM:  Alert & Oriented X3, Good concentration; Motor Strength 5/5 B/L upper and lower extremities; DTRs 2+ intact and symmetric  CHEST/LUNG: Clear to percussion bilaterally; No rales, rhonchi, wheezing bilaterally  HEART: Regular rate and rhythm; No murmurs, rubs, or gallops  ABDOMEN: Soft, Nontender, Nondistended; Bowel sounds present  EXTREMITIES:  2+ Peripheral Pulses, No clubbing, cyanosis, or edema  LYMPH: No lymphadenopathy noted  SKIN: No rashes or lesions  BACK: no pressor sore     LABS:                         11.8   10.3  )-----------( 251      ( 06 Sep 2017 14:21 )             36.7         137  |  100  |  17  ----------------------------<  75  3.3<L>   |  28  |  0.60    Ca    7.9<L>      07 Sep 2017 06:35  Mg     2.1         TPro  5.7<L>  /  Alb  1.9<L>  /  TBili  0.4  /  DBili  x   /  AST  11<L>  /  ALT  8<L>  /  AlkPhos  95      LIVER FUNCTIONS - ( 06 Sep 2017 14:20 )  Alb: 1.9 g/dL / Pro: 5.7 gm/dL / ALK PHOS: 95 U/L / ALT: 8 U/L / AST: 11 U/L / GGT: x             Urinalysis Basic - ( 06 Sep 2017 14:27 )    Color: Yellow / Appearance: Clear / S.010 / pH: x  Gluc: x / Ketone: Negative  / Bili: Negative / Urobili: 4 mg/dL   Blood: x / Protein: Negative mg/dL / Nitrite: Negative   Leuk Esterase: Trace / RBC: x / WBC 0-2   Sq Epi: x / Non Sq Epi: x / Bacteria: x        CARDIAC MARKERS ( 06 Sep 2017 14:20 )  <.015 ng/mL / x     / x     / x     / 0.9 ng/mL                                  Radiology:      < from: Xray Chest 1 View AP/PA. (17 @ 10:31) >  MPRESSION:  Bilateral pulmonary infiltrates with bilateral pleural effusions and   cardiomegaly.                CAIO ALICIA M.D., ATTENDING RADIOLOGIST  This document has been electronically signed. Sep  6 2017 11:29AM                < end of copied text > HPI:  · HPI  Pt is a 89 yo gentleman with a pmhx of CHF, CABG, who presents to the ED with decreased alertness coming from [facility]. He is at baseline responsive and interactive per nephew, but over the past day or so has been less responsive and just staring ahead and not eating. No reported fevers, patient is not speaking currently. Is DNR/DNI.  Admitted for pneumonia (07 Sep 2017 10:33)      PAST MEDICAL & SURGICAL HISTORY:  Shingles  SBO (small bowel obstruction)  HTN (hypertension)  S/P AVR (aortic valve replacement)  SBO (small bowel obstruction)      SOCHX:   no tobacco,  - no  alcohol    FMHX: FA/MO  -non  contributory     seen with nephew by bedside ;   Recent Travel:    Immunizations:    Allergies    No Known Allergies    Intolerances        MEDICATIONS  (STANDING):  vancomycin  IVPB 1000 milliGRAM(s) IV Intermittent once  latanoprost 0.005% Ophthalmic Solution 1 Drop(s) Both EYES at bedtime  piperacillin/tazobactam IVPB. 3.375 Gram(s) IV Intermittent every 8 hours  vancomycin  IVPB 750 milliGRAM(s) IV Intermittent every 12 hours    MEDICATIONS  (PRN):      REVIEW OF SYSTEMS:  poor historian   multiple admissions since march   CONSTITUTIONAL: No fever,  has had  weight loss, and  fatigue  EYES: No eye pain, visual disturbances, or discharge  ENMT:  No difficulty hearing, tinnitus, vertigo; No sinus or throat pain  NECK: No pain or stiffness  BREASTS: No pain, masses, or nipple discharge  RESPIRATORY: plus  cough,   no wheezing, chills or hemoptysis; No shortness of breath  CARDIOVASCULAR: No chest pain, palpitations, dizziness, or leg swelling  GASTROINTESTINAL: No abdominal or epigastric pain. No nausea, vomiting, or hematemesis; No diarrhea or constipation. No melena or hematochezia.  GENITOURINARY: No dysuria, frequency, hematuria, or incontinence  NEUROLOGICAL: No headaches, memory loss, loss of strength, numbness, or tremors  SKIN: No itching, burning, rashes, or lesions   LYMPH NODES: No enlarged glands  ENDOCRINE: No heat or cold intolerance; No hair loss  MUSCULOSKELETAL: No joint pain or swelling; No muscle, back, or extremity pain  PSYCHIATRIC: No depression, anxiety, mood swings, or difficulty sleeping  HEME/LYMPH: No easy bruising, or bleeding gums  ALLERGY AND IMMUNOLOGIC: No hives or eczema    VITAL SIGNS:    T(C): 36.6 (17 @ 12:48), Max: 37.2 (17 @ 06:00)  T(F): 97.8 (17 @ 12:48), Max: 98.9 (17 @ 06:00)  HR: 76 (17 @ 12:48) (72 - 90)  BP: 112/68 (17 @ 12:48) (112/68 - 134/67)  RR: 17 (17 @ 12:48) (16 - 20)  SpO2: 99% (17 @ 12:48) (99% - 100%)    PHYSICAL EXAM:    GENERAL: NAD, well-groomed, well-developed  HEAD:  Atraumatic, Normocephalic  EYES: EOMI, PERRLA, conjunctiva and sclera clear  ENMT: No tonsillar erythema, exudates, or enlargement; Moist mucous membranes, Good dentition, No lesions  NECK: Supple, No JVD, Normal thyroid  NERVOUS SYSTEM:  Alert & Oriented X3, Good concentration;   CHEST/LUNG: Clear  bilaterally; No rales, rhonchi, wheezing bilaterally  HEART: Regular rate and rhythm; No murmurs, rubs, or gallops  ABDOMEN: Soft, Nontender, Nondistended; Bowel sounds present  EXTREMITIES:  2+ Peripheral Pulses, No clubbing, cyanosis, or edema  LYMPH: No lymphadenopathy noted  SKIN: No rashes or lesions  BACK: no pressor sore     LABS:                         11.8   10.3  )-----------( 251      ( 06 Sep 2017 14:21 )             36.7         137  |  100  |  17  ----------------------------<  75  3.3<L>   |  28  |  0.60    Ca    7.9<L>      07 Sep 2017 06:35  Mg     2.1         TPro  5.7<L>  /  Alb  1.9<L>  /  TBili  0.4  /  DBili  x   /  AST  11<L>  /  ALT  8<L>  /  AlkPhos  95      LIVER FUNCTIONS - ( 06 Sep 2017 14:20 )  Alb: 1.9 g/dL / Pro: 5.7 gm/dL / ALK PHOS: 95 U/L / ALT: 8 U/L / AST: 11 U/L / GGT: x             Urinalysis Basic - ( 06 Sep 2017 14:27 )    Color: Yellow / Appearance: Clear / S.010 / pH: x  Gluc: x / Ketone: Negative  / Bili: Negative / Urobili: 4 mg/dL   Blood: x / Protein: Negative mg/dL / Nitrite: Negative   Leuk Esterase: Trace / RBC: x / WBC 0-2   Sq Epi: x / Non Sq Epi: x / Bacteria: x        CARDIAC MARKERS ( 06 Sep 2017 14:20 )  <.015 ng/mL / x     / x     / x     / 0.9 ng/mL                                  Radiology:      < from: Xray Chest 1 View AP/PA. (17 @ 10:31) >  MPRESSION:  Bilateral pulmonary infiltrates with bilateral pleural effusions and   cardiomegaly.                CAIO ALICIA M.D., ATTENDING RADIOLOGIST  This document has been electronically signed. Sep  6 2017 11:29AM                < end of copied text >

## 2017-09-07 NOTE — SWALLOW BEDSIDE ASSESSMENT ADULT - SLP GENERAL OBSERVATIONS
pt seen bedside alert and oriented x2. pt inconsistently responded to questions- mostly short single word utterances, and noted reduced initiation/participation. noted hoarse, low pitch vocal quality.

## 2017-09-07 NOTE — SWALLOW BEDSIDE ASSESSMENT ADULT - SWALLOW EVAL: DIAGNOSIS
pt refused po trials therefore limiting eval- however oral holding with thin liquid- suspect compensatory strategy, possible delay in swallow trigger with reduced laryngeal elevation and multiple swallows. no overt signs of aspiration with thin liquid although eval limited 2/2 pt refusal noted oropharyngeal phases of swallow marked by oral holding with thin liquid- suspect compensatory strategy, possible delay in swallow trigger with reduced laryngeal elevation and multiple swallows. no overt signs of aspiration with thin liquid

## 2017-09-07 NOTE — H&P ADULT - HISTORY OF PRESENT ILLNESS
· HPI  Pt is a 89 yo gentleman with a pmhx of CHF, CABG, who presents to the ED with decreased alertness coming from [facility]. He is at baseline responsive and interactive per nephew, but over the past day or so has been less responsive and just staring ahead and not eating. No reported fevers, patient is not speaking currently. Is DNR/DNI.  Admitted for pneumonia

## 2017-09-07 NOTE — SWALLOW BEDSIDE ASSESSMENT ADULT - SWALLOW EVAL: RECOMMENDED FEEDING/EATING TECHNIQUES
allow for swallow between intakes/maintain upright posture during/after eating for 30 mins/small sips/bites

## 2017-09-07 NOTE — GOALS OF CARE CONVERSATION - PERSONAL ADVANCE DIRECTIVE - CONVERSATION DETAILS
Pt is a 89 yo gentleman with a pmhx of CHF, CABG, who presents to the ED with decreased alertness coming from [facility]. He is at baseline responsive and interactive per nephew, but over the past day or so has been less responsive and just staring ahead and not eating. No reported fevers, patient is not speaking currently. Is DNR/DNI.  Admitted for pneumonia.  Meet with patient's nephew Jason Atwood and Dr. Lakhwinder Atwood on the phone and discussed GOC & Advanced care planning. Palliative care information /counseling. Advanced Directives addressed   As per HCP/ Nephew Dr. Lakhwinder atwood, only want comfort measures. DNR/ DNI. No antibiotics, tube feeding. Hospice care. if possible in patient hospice.

## 2017-09-07 NOTE — H&P ADULT - NSHPPHYSICALEXAM_GEN_ALL_CORE
GENERAL: NAD,Non verbal, Cachectic  HEAD:  Atraumatic, Normocephalic  EYES:, EDGAR, conjunctiva and sclera clear  ENMT:  Moist mucous membranes, Good dentition, No lesions  NECK: Supple, No JVD, Normal thyroid  NERVOUS SYSTEM:  Alert & Oriented X 1, ; Motor Strength 3/5 B/L upper and lower extremities;   CHEST/LUNG: Clear to percussion bilaterally; No rales, rhonchi, wheezing, or rubs  HEART: Regular rate and rhythm; No murmurs, rubs, or gallops  ABDOMEN: Soft, Nontender, Nondistended; Bowel sounds present  EXTREMITIES:  2+ Peripheral Pulses, No clubbing, cyanosis, or edema  LYMPH: No lymphadenopathy noted  SKIN: No rashes or lesions

## 2017-09-07 NOTE — H&P ADULT - NSHPLABSRESULTS_GEN_ALL_CORE
11.8   10.3  )-----------( 251      ( 06 Sep 2017 14:21 )             36.7     -    137  |  100  |  17  ----------------------------<  75  3.3<L>   |  28  |  0.60    Ca    7.9<L>      07 Sep 2017 06:35  Mg     2.1         TPro  5.7<L>  /  Alb  1.9<L>  /  TBili  0.4  /  DBili  x   /  AST  11<L>  /  ALT  8<L>  /  AlkPhos  95  -      Urinalysis Basic - ( 06 Sep 2017 14:27 )    Color: Yellow / Appearance: Clear / S.010 / pH: x  Gluc: x / Ketone: Negative  / Bili: Negative / Urobili: 4 mg/dL   Blood: x / Protein: Negative mg/dL / Nitrite: Negative   Leuk Esterase: Trace / RBC: x / WBC 0-2   Sq Epi: x / Non Sq Epi: x / Bacteria: x      CAPILLARY BLOOD GLUCOSE          CARDIAC MARKERS ( 06 Sep 2017 14:20 )  <.015 ng/mL / x     / x     / x     / 0.9 ng/mL        Urine Culture:      Blood Culture:

## 2017-09-07 NOTE — H&P ADULT - ASSESSMENT
Pt is a 89 yo gentleman with a pmhx of CHF, CABG, who presents to the ED with decreased alertness coming from [facility]. He is at baseline responsive and interactive per nephew, but over the past day or so has been less responsive and just staring ahead and not eating. No reported fevers, patient is not speaking currently. Is DNR/DNI.  Admitted for pneumonia. Start on IV antibiotics. Lasix X 1 dose

## 2017-09-07 NOTE — PROGRESS NOTE ADULT - SUBJECTIVE AND OBJECTIVE BOX
Initial Consultaion Note  Requested by Name:  Date/ Time:  Reason for referral/ Consultation:    HPI:  · HPI  Pt is a 89 yo gentleman with a pmhx of CHF, CABG, who presents to the ED with decreased alertness coming from [facility]. He is at baseline responsive and interactive per nephew, but over the past day or so has been less responsive and just staring ahead and not eating. No reported fevers, patient is not speaking currently. Is DNR/DNI.  Admitted for pneumonia (07 Sep 2017 10:33)      PAST MEDICAL & SURGICAL HISTORY:  Shingles  SBO (small bowel obstruction)  HTN (hypertension)  S/P AVR (aortic valve replacement)  SBO (small bowel obstruction)      SOCIAL HISTORY:Admitted from: home [ ] SNF [ ] STAR [ ] AL [ ]                                                                smoker [ ] past smoker [ ] non smoker[ ]                                                              no alcohol [ ] social alcohol [ ] alcohol [ ]  Surrogate/ HCP/ Guardian: [ ] YES [ ] NO. Name/ Phone#:  Significant other/partner:                     Children:                         Mosque/Spirituality:    FAMILY HISTORY:  No pertinent family history in first degree relatives      Baseline ADLs (Prior to admission)  Independent:  Moderately [ ] fully [ ]   Dependent: moderately [ ] fully [ ]    ADVANCE DIRECTIVES:  [ ] YES [ ] NO   DNR: YES [ ] NO [  ]  Completed on:                     MOLST: YES [ ] NO [  ]  Completed on:  Living Will: YES [ ] NO [  ]  Completed on:    Allergies    No Known Allergies    Intolerances      MEDICATIONS  (STANDING):  vancomycin  IVPB 1000 milliGRAM(s) IV Intermittent once  latanoprost 0.005% Ophthalmic Solution 1 Drop(s) Both EYES at bedtime  piperacillin/tazobactam IVPB. 3.375 Gram(s) IV Intermittent every 8 hours  vancomycin  IVPB 750 milliGRAM(s) IV Intermittent every 12 hours    MEDICATIONS  (PRN):    OPIATE NAIVE: (Y/N)  I STOP REVIEWED: (Y/N) : (#-------------------)   Review of Systems:     PAIN : (Y/N) (0-10)  PAIN SITE :                           QUALITY/QUANTITY OF PAIN :             PAIN RADIATING :( Y/N) SEVERITY :            FREQUENCY :  IMPACT ON ADLs :      DYSPNEA: Yes [  ] No [  ] Mild [ ] Moderate [ ] Severe [ ]  NAUSEA/VOMITING: Yes [  ] No [  ]  EXCESSIVE SECRETIONS: YES [  ] NO [  ]  DEPRESSION: Yes [  ] No [  ] Mild [ ]Moderate [ ] Severe [ ]  ANXIETY: Yes [  ] No [  ]  AGITATION: Yes [  ] No [  ]  CONSTIPATION : Yes [  ] No [  ]  DIARRHEA : Yes [  ] No [  ]  FRAILTY SYNDROME: Yes [  ] No [  ]  FAILURE TO THRIVE: Yes [  ] No [  ]  DIBILITY: Yes [  ] No [  ]  OTHER SYMPTOMS :  UNABLE TO OBTAIN DUE TO POOR MENTATION [  ]    PHYSICAL EXAM:  T(F): 97.8 (09-07-17 @ 12:48), Max: 98.9 (09-07-17 @ 06:00)  HR: 76 (09-07-17 @ 12:48) (72 - 90)  BP: 112/68 (09-07-17 @ 12:48) (112/68 - 134/67)  RR: 17 (09-07-17 @ 12:48) (16 - 20)  SpO2: 99% (09-07-17 @ 12:48) (99% - 100%)  Wt(kg): --   WEIGHT :                      BMI:  I&O's Summary    06 Sep 2017 07:01  -  07 Sep 2017 07:00  --------------------------------------------------------  IN: 100 mL / OUT: 0 mL / NET: 100 mL      CAPILLARY BLOOD GLUCOSE          GENERAL: alert: Yes [ ] No [  ]oriented x ______ lethargic [ ] agitated [ ] cachexia [ ] nonverbal [ ] coma [ ]  HEENT: normal [ ] dry mouth [ ] Bipap [  ] ET tube/trach [ ]Vent [  ] excessive secretions [ ]  LUNGS: Comfortable [ ] tachypnea/ labored breathing[ ]   CV :  normal [ ] tachycardia [ ]bradycardia [  ]   GI : normal [ ] distended [ ] tender [ ] no BS [ ]  PEG /NG tube [ ]   : normal [ ] incontinent [ ] oliguria/anuria [ ] kidd [ ]  MSK : normal [ ] weakness [ ] edema [ ] ambulatory [ ] bebbound/wheelchair bound [ ]   SKIN : normal [ ] pressure ulcer: Yes [  ] No [  ] Stage ______________ rash: Yes [  ] No [  ]    Functional Assessment:   Karnofsky Performance Score :  Palliative Performance Status Version 2:         %    LABS:                        11.8   10.3  )-----------( 251      ( 06 Sep 2017 14:21 )             36.7     09-07    137  |  100  |  17  ----------------------------<  75  3.3<L>   |  28  |  0.60    Ca    7.9<L>      07 Sep 2017 06:35  Mg     2.1     09-06    TPro  5.7<L>  /  Alb  1.9<L>  /  TBili  0.4  /  DBili  x   /  AST  11<L>  /  ALT  8<L>  /  AlkPhos  95  09-06      CARDIAC MARKERS ( 06 Sep 2017 14:20 )  <.015 ng/mL / x     / x     / x     / 0.9 ng/mL      I&O's Detail    06 Sep 2017 07:01  -  07 Sep 2017 07:00  --------------------------------------------------------  IN:    Solution: 100 mL  Total IN: 100 mL    OUT:  Total OUT: 0 mL    Total NET: 100 mL          Assessment:   89y Male admitted with ALTERTED MENTAL STATUS, UNSPECIFIED ALTERTED MENTAL STATUS      PROBLEM LIST :  PROBLEM/RECOMMENDATION: 1) Problem : Goals of care, counseling/ discussion.  Recommendation: Meet with patient/ family and discussed GOC & Advanced care planning                                    Palliative care information /counseling                                        Advanced Directives addressed   PROBLEM/ RECOMMENDATION:2)Problem :Resuscitation/ DNR/ DNI,   Recommendation: DNR/ DNI    PROBLEM/ RECOMMENDATION :3)Problem : Medical order for life sustaning treatment  Recommendation : MOLST Form ( initiated/ completed)    PROBLEM/RECOMMENDATION :4)Problem : Advanced care planning  Recommendation : Family meeting on:     PLAN:  REFFERALS:  Hospice: YES [  ] NO [  ]                         Palliative Med : YES [  ] NO [  ]                         Unit SW/Case Mgmt: YES [  ] NO [  ]                         : YES [  ] NO [  ]                         Speech/Swallow: YES [  ] NO [  ]                         Pain Management: YES [  ] NO [  ]                         Nutrition: YES [  ] NO [  ]                         Ethics: YES [  ] NO [  ]                         PT/OT: YES [  ] NO [  ]  Symptom Management Recomendations: Comfort measures only.  Hospice evaluation called in Initial Consultaion Note  Requested by Name: Dr. Newman  Date/ Time: 14;00  Reason for referral/ Consultation: goals of care conversation    HPI:  · HPI  Pt is a 89 yo gentleman with a pmhx of CHF, CABG, who presents to the ED with decreased alertness coming from [facility]. He is at baseline responsive and interactive per nephew, but over the past day or so has been less responsive and just staring ahead and not eating. No reported fevers, patient is not speaking currently. Is DNR/DNI.  Admitted for pneumonia.  PAST MEDICAL & SURGICAL HISTORY:  Shingles  SBO (small bowel obstruction)  HTN (hypertension)  S/P AVR (aortic valve replacement)  SBO (small bowel obstruction)  SOCIAL HISTORY: Admitted from: home [ ] SNF [ ] STAR [ ] AL [X ]                                                                smoker [ ] past smoker [ ] non smoker[ ]                                                              no alcohol [ ] social alcohol [ ] alcohol [ ]  Surrogate/ HCP/ Guardian: [ ] YES [ ] NO. Name/ Phone#: Dr. Lakhwinder Garcia ( nephew/HCP)[phone]  Significant other/partner:                     Children:                         Gnosticist/Spirituality:    FAMILY HISTORY:  No pertinent family history in first degree relatives  Baseline ADLs (Prior to admission)  Independent:  Moderately [ ] fully [ ]   Dependent: moderately [ ] fully [X ]    ADVANCE DIRECTIVES:  [X ] YES [ ] NO   DNR: YES [X ] NO [  ]  Completed on:                     MOLST: YES [X ] NO [  ]  Completed on:8/8/17  Living Will: YES [ ] NO [  ]  Completed on:    Allergies    No Known Allergies    Intolerances    MEDICATIONS  (STANDING):  vancomycin  IVPB 1000 milliGRAM(s) IV Intermittent once  latanoprost 0.005% Ophthalmic Solution 1 Drop(s) Both EYES at bedtime  piperacillin/tazobactam IVPB. 3.375 Gram(s) IV Intermittent every 8 hours  vancomycin  IVPB 750 milliGRAM(s) IV Intermittent every 12 hours    MEDICATIONS  (PRN):    OPIATE NAIVE: (Y/N)  I STOP REVIEWED: (Y/N) : (#-------------------)   Review of Systems:     PAIN : (Y/N) (0-10)  PAIN SITE : GENERALIZED    QUALITY/QUANTITY OF PAIN :  DULL    PAIN RADIATING : N  SEVERITY :            FREQUENCY :OCCATIONAL  IMPACT ON ADLs :    TOTAL CARE  DYSPNEA: Yes [X  ] No [  ] Mild [X ] Moderate [ ] Severe [ ]  NAUSEA/VOMITING: Yes [  ] No [X  ]  EXCESSIVE SECRETIONS: YES [  ] NO [X  ]  DEPRESSION: Yes [ X ] No [  ] Mild [ ]Moderate [X ] Severe [ ]  ANXIETY: Yes [  ] No [X  ]  AGITATION: Yes [  ] No [ X ]  CONSTIPATION : Yes [  ] No [X]  DIARRHEA : Yes [  ] No [ X ]  FRAILTY SYNDROME: Yes [ X ] No [  ]  FAILURE TO THRIVE: Yes [X  ] No [  ]  DEBILITY Yes [ X ] No [  ]  OTHER SYMPTOMS :  UNABLE TO OBTAIN DUE TO POOR MENTATION [ X ]    PHYSICAL EXAM:  T(F): 97.8 (09-07-17 @ 12:48), Max: 98.9 (09-07-17 @ 06:00)  HR: 76 (09-07-17 @ 12:48) (72 - 90)  BP: 112/68 (09-07-17 @ 12:48) (112/68 - 134/67)  RR: 17 (09-07-17 @ 12:48) (16 - 20)  SpO2: 99% (09-07-17 @ 12:48) (99% - 100%)  Wt(kg): --   WEIGHT :                      BMI:  I&O's Summary    06 Sep 2017 07:01  -  07 Sep 2017 07:00  --------------------------------------------------------  IN: 100 mL / OUT: 0 mL / NET: 100 mL    CAPILLARY BLOOD GLUCOSE  GENERAL: alert: Yes [ X] No [  ]oriented x __2____ lethargic [X ] agitated [ ] cachexia [X ] nonverbal [ ] coma [ ]  HEENT: normal [X ] dry mouth [ ] Bipap [  ] ET tube/trach [ ]Vent [  ] excessive secretions [ ]  LUNGS: Comfortable [ ] tachypnea/ labored breathing[X ]   CV :  normal [X ] tachycardia [ ]bradycardia [  ]   GI : normal [X ] distended [ ] tender [ ] no BS [ ]  PEG /NG tube [ ]   : normal [ ] incontinent [X ] oliguria/anuria [ ] Montiel [ ]  MSK : normal [ ] weakness [ ] edema [ ] ambulatory [ ] bedbound/ wheelchair bound [x ]   SKIN : normal [x ] pressure ulcer: Yes [  ] No [  ] Stage ______________ rash: Yes [  ] No [  ]    Functional Assessment:   Karnofsky Performance Score : <20  Palliative Performance Status Version 2:         %    LABS:                        11.8   10.3  )-----------( 251      ( 06 Sep 2017 14:21 )             36.7     09-07    137  |  100  |  17  ----------------------------<  75  3.3<L>   |  28  |  0.60    Ca    7.9<L>      07 Sep 2017 06:35  Mg     2.1     09-06    TPro  5.7<L>  /  Alb  1.9<L>  /  TBili  0.4  /  DBili  x   /  AST  11<L>  /  ALT  8<L>  /  AlkPhos  95  09-06      CARDIAC MARKERS ( 06 Sep 2017 14:20 )  <.015 ng/mL / x     / x     / x     / 0.9 ng/mL    I&O's Detail    06 Sep 2017 07:01  -  07 Sep 2017 07:00  --------------------------------------------------------  IN:    Solution: 100 mL  Total IN: 100 mL    OUT:  Total OUT: 0 mL    Total NET: 100 mL    Assessment:   89y Male admitted with ALTERTED MENTAL STATUS, UNSPECIFIED ALTERTED MENTAL STATUS    PROBLEM LIST :  PROBLEM/RECOMMENDATION: 1) Problem : Goals of care, counseling/ discussion.  Recommendation: Meet with patient's nephew Jason Garcia and Dr. Lakhwinder Garcia on the phone and discussed GOC & Advanced care planning. Palliative care information /counseling. Advanced Directives addressed   PROBLEM/ RECOMMENDATION:2)Problem :Resuscitation/ DNR/ DNI,   Recommendation: DNR/ DNI    PROBLEM/ RECOMMENDATION :3)Problem : Medical order for life sustaning treatment  Recommendation : MOLST Form complete.    PROBLEM/RECOMMENDATION :4)Problem : Advanced care planning  Recommendation : Family meeting on: 9/7/17  As per HCP/ Nephew Dr. Lakhwinder garcia, only want comfort measures. DNR/ DNI. No antibiotics, tube feeding. Hospice care. if possible in patient hospice.  PLAN:  REFERRALS  Hospice: YES [ x ] NO [  ]                         Palliative Med : YES [ x ] NO [  ]                         Unit SW/Case Mgmt: YES [x  ] NO [  ]                         : YES [ x ] NO [  ]                         Speech/Swallow: YES [ x ] NO [  ]                         Pain Management: YES [ x ] NO [  ]                         Nutrition: YES [x  ] NO [  ]                         Ethics: YES [  ] NO [  ]                         PT/OT: YES [  ] NO [  ]  Symptom Management Recommendations Comfort measures only.  Hospice evaluation called in.  If possible in patient hospice.  NO TUBE FEEDING.  ENSURE 1 CAN TID.  SWALLOW EVALUATION.  PAIN MANAGEMENT.

## 2017-09-07 NOTE — GOALS OF CARE CONVERSATION - PERSONAL ADVANCE DIRECTIVE - TREATMENT GUIDELINE COMMENT
Symptom Management Recommendations Comfort measures only.  Hospice evaluation called in If possible in patient hospice.  NO TUBE FEEDING. ENSURE 1 CAN TID.  SWALLOW EVALUATION. PAIN MANAGEMENT.

## 2017-09-08 LAB
ANION GAP SERPL CALC-SCNC: 11 MMOL/L — SIGNIFICANT CHANGE UP (ref 5–17)
BUN SERPL-MCNC: 20 MG/DL — SIGNIFICANT CHANGE UP (ref 7–23)
CALCIUM SERPL-MCNC: 8.1 MG/DL — LOW (ref 8.5–10.1)
CHLORIDE SERPL-SCNC: 100 MMOL/L — SIGNIFICANT CHANGE UP (ref 96–108)
CO2 SERPL-SCNC: 24 MMOL/L — SIGNIFICANT CHANGE UP (ref 22–31)
CREAT SERPL-MCNC: 0.7 MG/DL — SIGNIFICANT CHANGE UP (ref 0.5–1.3)
GLUCOSE SERPL-MCNC: 49 MG/DL — LOW (ref 70–99)
HCT VFR BLD CALC: 33.9 % — LOW (ref 39–50)
HGB BLD-MCNC: 10.8 G/DL — LOW (ref 13–17)
MAGNESIUM SERPL-MCNC: 2.2 MG/DL — SIGNIFICANT CHANGE UP (ref 1.6–2.6)
MCHC RBC-ENTMCNC: 28.9 PG — SIGNIFICANT CHANGE UP (ref 27–34)
MCHC RBC-ENTMCNC: 32 GM/DL — SIGNIFICANT CHANGE UP (ref 32–36)
MCV RBC AUTO: 90.3 FL — SIGNIFICANT CHANGE UP (ref 80–100)
PHOSPHATE SERPL-MCNC: 3.1 MG/DL — SIGNIFICANT CHANGE UP (ref 2.5–4.5)
PLATELET # BLD AUTO: 218 K/UL — SIGNIFICANT CHANGE UP (ref 150–400)
POTASSIUM SERPL-MCNC: 5.1 MMOL/L — SIGNIFICANT CHANGE UP (ref 3.5–5.3)
POTASSIUM SERPL-SCNC: 5.1 MMOL/L — SIGNIFICANT CHANGE UP (ref 3.5–5.3)
RBC # BLD: 3.75 M/UL — LOW (ref 4.2–5.8)
RBC # FLD: 15.7 % — HIGH (ref 11–15)
SODIUM SERPL-SCNC: 135 MMOL/L — SIGNIFICANT CHANGE UP (ref 135–145)
WBC # BLD: 11.7 K/UL — HIGH (ref 3.8–10.5)
WBC # FLD AUTO: 11.7 K/UL — HIGH (ref 3.8–10.5)

## 2017-09-08 RX ADMIN — LATANOPROST 1 DROP(S): 0.05 SOLUTION/ DROPS OPHTHALMIC; TOPICAL at 22:50

## 2017-09-08 NOTE — PROGRESS NOTE ADULT - ASSESSMENT
Pt is a 89 yo gentleman with a pmhx of CHF, CABG, who presents to the ED with decreased alertness coming from [facility]. He is at baseline responsive and interactive per nephew, but over the past day or so has been less responsive and just staring ahead and not eating. No reported fevers, patient is not speaking currently. Is DNR/DNI.  Admitted for pneumonia. As per HCP, Pt. does not want any antibiotics or further tests. Palliative care consult called and done. Noted. Pt, referred to Hospice care network.

## 2017-09-08 NOTE — PROGRESS NOTE ADULT - SUBJECTIVE AND OBJECTIVE BOX
Patient is a 89y old  Male who presents with a chief complaint of AMS (07 Sep 2017 10:33)      INTERVAL HPI/OVERNIGHT EVENTS:    MEDICATIONS  (STANDING):  latanoprost 0.005% Ophthalmic Solution 1 Drop(s) Both EYES at bedtime    MEDICATIONS  (PRN):      Allergies    No Known Allergies    Intolerances        REVIEW OF SYSTEMS: NA  CONSTITUTIONAL: No fever, weight loss, or fatigue  EYES: No eye pain, visual disturbances, or discharge  ENMT:  No difficulty hearing, tinnitus, vertigo; No sinus or throat pain  NECK: No pain or stiffness  BREASTS: No pain, masses, or nipple discharge  RESPIRATORY: No cough, wheezing, chills or hemoptysis; No shortness of breath  CARDIOVASCULAR: No chest pain, palpitations, dizziness, or leg swelling  GASTROINTESTINAL: No abdominal or epigastric pain. No nausea, vomiting, or hematemesis; No diarrhea or constipation. No melena or hematochezia.  GENITOURINARY: No dysuria, frequency, hematuria, or incontinence  NEUROLOGICAL: No headaches, memory loss, loss of strength, numbness, or tremors  SKIN: No itching, burning, rashes, or lesions   LYMPH NODES: No enlarged glands  ENDOCRINE: No heat or cold intolerance; No hair loss  MUSCULOSKELETAL: No joint pain or swelling; No muscle, back, or extremity pain  PSYCHIATRIC: No depression, anxiety, mood swings, or difficulty sleeping  HEME/LYMPH: No easy bruising, or bleeding gums  ALLERGY AND IMMUNOLOGIC: No hives or eczema    Vital Signs Last 24 Hrs  T(C): 36.8 (08 Sep 2017 05:36), Max: 36.8 (08 Sep 2017 05:36)  T(F): 98.2 (08 Sep 2017 05:36), Max: 98.2 (08 Sep 2017 05:36)  HR: 66 (08 Sep 2017 05:36) (66 - 85)  BP: 101/57 (08 Sep 2017 05:36) (101/57 - 112/68)  BP(mean): --  RR: 17 (08 Sep 2017 05:36) (17 - 18)  SpO2: 99% (08 Sep 2017 05:36) (99% - 100%)    PHYSICAL EXAM:  GENERAL: NAD, well-groomed, well-developed  HEAD:  Atraumatic, Normocephalic  EYES: , conjunctiva and sclera clear  ENMT:  Moist mucous membranes, Good dentition, No lesions  NECK: Supple, No JVD, Normal thyroid  NERVOUS SYSTEM:  Alert & Oriented X3, Good concentration; Motor Strength 5/5 B/L upper and lower extremities; DTRs 2+ intact and symmetric  CHEST/LUNG: Clear to percussion bilaterally; No rales, rhonchi, wheezing, or rubs  HEART: Regular rate and rhythm; No murmurs, rubs, or gallops  ABDOMEN: Soft, Nontender, Nondistended; Bowel sounds present  EXTREMITIES:  2+ Peripheral Pulses, No clubbing, cyanosis, or edema  LYMPH: No lymphadenopathy noted  SKIN: No rashes or lesions    LABS:                        10.8   11.7  )-----------( 218      ( 08 Sep 2017 06:52 )             33.9     -    135  |  100  |  20  ----------------------------<  49<L>  5.1   |  24  |  0.70    Ca    8.1<L>      08 Sep 2017 06:52  Phos  3.1       Mg     2.2         TPro  5.7<L>  /  Alb  1.9<L>  /  TBili  0.4  /  DBili  x   /  AST  11<L>  /  ALT  8<L>  /  AlkPhos  95        Urinalysis Basic - ( 06 Sep 2017 14:27 )    Color: Yellow / Appearance: Clear / S.010 / pH: x  Gluc: x / Ketone: Negative  / Bili: Negative / Urobili: 4 mg/dL   Blood: x / Protein: Negative mg/dL / Nitrite: Negative   Leuk Esterase: Trace / RBC: x / WBC 0-2   Sq Epi: x / Non Sq Epi: x / Bacteria: x      CAPILLARY BLOOD GLUCOSE          CARDIAC MARKERS ( 06 Sep 2017 14:20 )  <.015 ng/mL / x     / x     / x     / 0.9 ng/mL      Procalcitonin, Serum: 0.16 ng/mL ( @ 11:28)        Urine Culture:      Blood Culture:      RADIOLOGY & ADDITIONAL TESTS:    Imaging Personally Reviewed:  [ ] YES  [ ] NO    Consultant(s) Notes Reviewed:  [ ] YES  [ ] NO    Care Discussed with Consultants/Other Providers [ ] YES  [ ] NO    PROBLEMS:  ALTERTED MENTAL STATUS, UNSPECIFIED ALTERTED MENTAL STATUS  SENT FROM [facility] FOR [facility]PS[facility]  Altered mental status, unspecified altered mental status type  Pleural effusion, bilateral  Essential hypertension  Altered mental status, unspecified altered mental status type  Pneumonia of both lower lobes due to infectious organism      Care discussed with family,         [ x ]   yes  [  ]  No    imp:    stable[ ]    unstable[  ]     improving [   ]       unchanged  [  ]                Plans:  Continue present plans  [  ]               New consult [  ]   specialty  .......               order sam[  ]    test name.                  Discharge Planning  [  ]

## 2017-09-08 NOTE — PROGRESS NOTE ADULT - SUBJECTIVE AND OBJECTIVE BOX
HPI:  · HPI  Pt is a 89 yo gentleman with a pmhx of CHF, CABG, who presents to the ED with decreased alertness coming from [facility]. He is at baseline responsive and interactive per nephew, but over the past day or so has been less responsive and just staring ahead and not eating. No reported fevers, patient is not speaking currently. Is DNR/DNI.  Admitted for pneumonia.  PAST MEDICAL & SURGICAL HISTORY:  Shingles  SBO (small bowel obstruction)  HTN (hypertension)  S/P AVR (aortic valve replacement)  SBO (small bowel obstruction)  SOCIAL HISTORY: Admitted from: home [ ] SNF [ ] STAR [ ] AL [X ]                                                                smoker [ ] past smoker [ ] non smoker[ ]                                                              no alcohol [ ] social alcohol [ ] alcohol [ ]  Surrogate/ HCP/ Guardian: [ ] YES [ ] NO. Name/ Phone#: Dr. Lakhwinder Garcia ( nephew/HCP)[phone]  Significant other/partner:                     Children:                         Presybeterian/Spirituality:    FAMILY HISTORY:  No pertinent family history in first degree relatives  Baseline ADLs (Prior to admission)  Independent:  Moderately [ ] fully [ ]   Dependent: moderately [ ] fully [X ]    ADVANCE DIRECTIVES:  [X ] YES [ ] NO   DNR: YES [X ] NO [  ]  Completed on:                     MOLST: YES [X ] NO [  ]  Completed on:8/8/17  Living Will: YES [ ] NO [  ]  Completed on:    Allergies    No Known Allergies    Intolerances    MEDICATIONS  (STANDING):  vancomycin  IVPB 1000 milliGRAM(s) IV Intermittent once  latanoprost 0.005% Ophthalmic Solution 1 Drop(s) Both EYES at bedtime  piperacillin/tazobactam IVPB. 3.375 Gram(s) IV Intermittent every 8 hours  vancomycin  IVPB 750 milliGRAM(s) IV Intermittent every 12 hours    MEDICATIONS  (PRN):    OPIATE NAIVE: (Y/N)  I STOP REVIEWED: (Y/N) : (#-------------------)   Review of Systems:     PAIN : (Y/N) (0-10)  PAIN SITE : GENERALIZED    QUALITY/QUANTITY OF PAIN :  DULL    PAIN RADIATING : N  SEVERITY :            FREQUENCY :OCCATIONAL  IMPACT ON ADLs :    TOTAL CARE  DYSPNEA: Yes [X  ] No [  ] Mild [X ] Moderate [ ] Severe [ ]  NAUSEA/VOMITING: Yes [  ] No [X  ]  EXCESSIVE SECRETIONS: YES [  ] NO [X  ]  DEPRESSION: Yes [ X ] No [  ] Mild [ ]Moderate [X ] Severe [ ]  ANXIETY: Yes [  ] No [X  ]  AGITATION: Yes [  ] No [ X ]  CONSTIPATION : Yes [  ] No [X]  DIARRHEA : Yes [  ] No [ X ]  FRAILTY SYNDROME: Yes [ X ] No [  ]  FAILURE TO THRIVE: Yes [X  ] No [  ]  DEBILITY Yes [ X ] No [  ]  OTHER SYMPTOMS :  UNABLE TO OBTAIN DUE TO POOR MENTATION [ X ]    PHYSICAL EXAM:  T(F): 97.8 (09-07-17 @ 12:48), Max: 98.9 (09-07-17 @ 06:00)  HR: 76 (09-07-17 @ 12:48) (72 - 90)  BP: 112/68 (09-07-17 @ 12:48) (112/68 - 134/67)  RR: 17 (09-07-17 @ 12:48) (16 - 20)  SpO2: 99% (09-07-17 @ 12:48) (99% - 100%)  Wt(kg): --   WEIGHT :                      BMI:  I&O's Summary    06 Sep 2017 07:01  -  07 Sep 2017 07:00  --------------------------------------------------------  IN: 100 mL / OUT: 0 mL / NET: 100 mL    CAPILLARY BLOOD GLUCOSE  GENERAL: alert: Yes [ X] No [  ]oriented x __2____ lethargic [X ] agitated [ ] cachexia [X ] nonverbal [ ] coma [ ]  HEENT: normal [X ] dry mouth [ ] Bipap [  ] ET tube/trach [ ]Vent [  ] excessive secretions [ ]  LUNGS: Comfortable [ ] tachypnea/ labored breathing[X ]   CV :  normal [X ] tachycardia [ ]bradycardia [  ]   GI : normal [X ] distended [ ] tender [ ] no BS [ ]  PEG /NG tube [ ]   : normal [ ] incontinent [X ] oliguria/anuria [ ] Montiel [ ]  MSK : normal [ ] weakness [ ] edema [ ] ambulatory [ ] bedbound/ wheelchair bound [x ]   SKIN : normal [x ] pressure ulcer: Yes [  ] No [  ] Stage ______________ rash: Yes [  ] No [  ]    Functional Assessment:   Karnofsky Performance Score : <20  Palliative Performance Status Version 2:         %    LABS:                        11.8   10.3  )-----------( 251      ( 06 Sep 2017 14:21 )             36.7     09-07    137  |  100  |  17  ----------------------------<  75  3.3<L>   |  28  |  0.60    Ca    7.9<L>      07 Sep 2017 06:35  Mg     2.1     09-06    TPro  5.7<L>  /  Alb  1.9<L>  /  TBili  0.4  /  DBili  x   /  AST  11<L>  /  ALT  8<L>  /  AlkPhos  95  09-06      CARDIAC MARKERS ( 06 Sep 2017 14:20 )  <.015 ng/mL / x     / x     / x     / 0.9 ng/mL    I&O's Detail    06 Sep 2017 07:01  -  07 Sep 2017 07:00  --------------------------------------------------------  IN:    Solution: 100 mL  Total IN: 100 mL    OUT:  Total OUT: 0 mL    Total NET: 100 mL    Assessment:   89y Male admitted with ALTERTED MENTAL STATUS, UNSPECIFIED ALTERTED MENTAL STATUS    PROBLEM LIST :  PROBLEM/RECOMMENDATION: 1) Problem : Goals of care, counseling/ discussion.  Recommendation: Meet with patient's nephew Jason Garcia and Dr. Lakhwinder Garcia on the phone and discussed GOC & Advanced care planning. Palliative care information /counseling. Advanced Directives addressed   PROBLEM/ RECOMMENDATION:2)Problem :Resuscitation/ DNR/ DNI,   Recommendation: DNR/ DNI    PROBLEM/ RECOMMENDATION :3)Problem : Medical order for life sustaning treatment  Recommendation : MOLST Form complete.    PROBLEM/RECOMMENDATION :4)Problem : Advanced care planning  Recommendation : Family meeting on: 9/7/17  As per HCP/ Nephew Dr. Lakhwinder garcia, only want comfort measures. DNR/ DNI. No antibiotics, tube feeding. Hospice care. if possible in patient hospice.  PLAN:  REFERRALS  Hospice: YES [ x ] NO [  ]                         Palliative Med : YES [ x ] NO [  ]                         Unit SW/Case Mgmt: YES [x  ] NO [  ]                         : YES [ x ] NO [  ]                         Speech/Swallow: YES [ x ] NO [  ]                         Pain Management: YES [ x ] NO [  ]                         Nutrition: YES [x  ] NO [  ]                         Ethics: YES [  ] NO [  ]                         PT/OT: YES [  ] NO [  ]  Symptom Management Recommendations Comfort measures only.  Hospice evaluation called in.  If possible in patient hospice.  NO TUBE FEEDING.  ENSURE 1 CAN TID.  SWALLOW EVALUATION.  PAIN MANAGEMENT.

## 2017-09-09 PROCEDURE — 71250 CT THORAX DX C-: CPT | Mod: 26

## 2017-09-09 RX ORDER — PIPERACILLIN AND TAZOBACTAM 4; .5 G/20ML; G/20ML
3.38 INJECTION, POWDER, LYOPHILIZED, FOR SOLUTION INTRAVENOUS ONCE
Qty: 0 | Refills: 0 | Status: COMPLETED | OUTPATIENT
Start: 2017-09-09 | End: 2017-09-09

## 2017-09-09 RX ORDER — PIPERACILLIN AND TAZOBACTAM 4; .5 G/20ML; G/20ML
3.38 INJECTION, POWDER, LYOPHILIZED, FOR SOLUTION INTRAVENOUS EVERY 8 HOURS
Qty: 0 | Refills: 0 | Status: DISCONTINUED | OUTPATIENT
Start: 2017-09-09 | End: 2017-09-16

## 2017-09-09 RX ADMIN — PIPERACILLIN AND TAZOBACTAM 200 GRAM(S): 4; .5 INJECTION, POWDER, LYOPHILIZED, FOR SOLUTION INTRAVENOUS at 14:06

## 2017-09-09 RX ADMIN — PIPERACILLIN AND TAZOBACTAM 25 GRAM(S): 4; .5 INJECTION, POWDER, LYOPHILIZED, FOR SOLUTION INTRAVENOUS at 22:17

## 2017-09-09 RX ADMIN — LATANOPROST 1 DROP(S): 0.05 SOLUTION/ DROPS OPHTHALMIC; TOPICAL at 22:17

## 2017-09-09 NOTE — PROGRESS NOTE ADULT - SUBJECTIVE AND OBJECTIVE BOX
Patient is a 89y old  Male who presents with a chief complaint of AMS (07 Sep 2017 10:33)      INTERVAL HPI/OVERNIGHT EVENTS:    MEDICATIONS  (STANDING):  latanoprost 0.005% Ophthalmic Solution 1 Drop(s) Both EYES at bedtime    MEDICATIONS  (PRN):      Allergies    No Known Allergies    Intolerances        REVIEW OF SYSTEMS:NA  CONSTITUTIONAL: No fever, weight loss, or fatigue  EYES: No eye pain, visual disturbances, or discharge  ENMT:  No difficulty hearing, tinnitus, vertigo; No sinus or throat pain  NECK: No pain or stiffness  BREASTS: No pain, masses, or nipple discharge  RESPIRATORY: No cough, wheezing, chills or hemoptysis; No shortness of breath  CARDIOVASCULAR: No chest pain, palpitations, dizziness, or leg swelling  GASTROINTESTINAL: No abdominal or epigastric pain. No nausea, vomiting, or hematemesis; No diarrhea or constipation. No melena or hematochezia.  GENITOURINARY: No dysuria, frequency, hematuria, or incontinence  NEUROLOGICAL: No headaches, memory loss, loss of strength, numbness, or tremors  SKIN: No itching, burning, rashes, or lesions   LYMPH NODES: No enlarged glands  ENDOCRINE: No heat or cold intolerance; No hair loss  MUSCULOSKELETAL: No joint pain or swelling; No muscle, back, or extremity pain  PSYCHIATRIC: No depression, anxiety, mood swings, or difficulty sleeping  HEME/LYMPH: No easy bruising, or bleeding gums  ALLERGY AND IMMUNOLOGIC: No hives or eczema    Vital Signs Last 24 Hrs  T(C): 36.1 (09 Sep 2017 11:51), Max: 36.1 (08 Sep 2017 18:00)  T(F): 97 (09 Sep 2017 11:51), Max: 97 (08 Sep 2017 18:00)  HR: 90 (09 Sep 2017 11:51) (76 - 90)  BP: 97/65 (09 Sep 2017 11:51) (97/65 - 105/58)  BP(mean): --  RR: 17 (09 Sep 2017 11:51) (16 - 19)  SpO2: 98% (09 Sep 2017 11:51) (98% - 99%)    PHYSICAL EXAM:  GENERAL: NAD,Cachectic  HEAD:  Atraumatic, Bitemporal wasting  EYES: EOMI, EDGAR, conjunctiva and sclera clear  ENMT:  Moist mucous membranes, Good dentition, No lesions  NECK: Supple, No JVD, Normal thyroid  NERVOUS SYSTEM:  Alert & Oriented X 2, Poor concentration; Motor Strength 3/5 B/L upper and lower extremities; DTRs 2+ intact and symmetric  CHEST/LUNG: Clear to percussion bilaterally; No rales, rhonchi, wheezing, or rubs  HEART: Regular rate and rhythm; No murmurs, rubs, or gallops  ABDOMEN: Soft, Nontender, Nondistended; Bowel sounds present  EXTREMITIES:  2+ Peripheral Pulses, No clubbing, cyanosis, or edema  LYMPH: No lymphadenopathy noted  SKIN: No rashes or lesions    LABS:                        10.8   11.7  )-----------( 218      ( 08 Sep 2017 06:52 )             33.9     09-08    135  |  100  |  20  ----------------------------<  49<L>  5.1   |  24  |  0.70    Ca    8.1<L>      08 Sep 2017 06:52  Phos  3.1     09-08  Mg     2.2     09-08          CAPILLARY BLOOD GLUCOSE              Procalcitonin, Serum: 0.16 ng/mL (09-07 @ 11:28)        Urine Culture:      Blood Culture:      RADIOLOGY & ADDITIONAL TESTS:  < from: Xray Chest 1 View AP/PA. (09.06.17 @ 10:31) >    EXAM:  CHEST SINGLE VIEW                            PROCEDURE DATE:  09/06/2017          INTERPRETATION:  cough    A frontal chest film demonstrates multifocal pneumonia with bilateral   pleural effusions    Heart is enlarged      There is worsening compared with the C5 7 2017 .     The osseous structures appear intact intact.     IMPRESSION:  Bilateral pulmonary infiltrates with bilateral pleural effusions and   cardiomegaly.                CAIO ALICIA M.D., ATTENDING RADIOLOGIST  This document has been electronically signed. Sep  6 2017 11:29AM                < end of copied text >    Imaging Personally Reviewed:  [ ] YES  [ ] NO    Consultant(s) Notes Reviewed:  [ ] YES  [ ] NO    Care Discussed with Consultants/Other Providers [ ] YES  [ ] NO    PROBLEMS:  ALTERTED MENTAL STATUS, UNSPECIFIED ALTERTED MENTAL STATUS  SENT FROM [facility] FOR SEPSIS  Altered mental status, unspecified altered mental status type  Pleural effusion, bilateral  Essential hypertension  Altered mental status, unspecified altered mental status type  Pneumonia of both lower lobes due to infectious organism      Care discussed with family,         [  ]   yes  [  ]  No    imp:    stable[ ]    unstable[  ]     improving [   ]       unchanged  [  ]                Plans:  Continue present plans  [  ]               New consult [  ]   specialty  .......               order sam[  ]    test name.                  Discharge Planning  [  ]

## 2017-09-09 NOTE — CONSULT NOTE ADULT - SUBJECTIVE AND OBJECTIVE BOX
Patient is a 89y old  Male who presents with a chief complaint of AMS (07 Sep 2017 10:33)    HPI:  · HPI  Pt is a 89 yo gentleman with a pmhx of CHF, CABG, who presents to the ED with decreased alertness coming from [facility]. He is at baseline responsive and interactive per nephew, but over the past day or so has been less responsive and just staring ahead and not eating. No reported fevers, patient is not speaking currently. Is DNR/DNI.  Admitted for pneumonia (07 Sep 2017 10:33)    PAST MEDICAL & SURGICAL HISTORY:  Shingles  SBO (small bowel obstruction)  HTN (hypertension)  S/P AVR (aortic valve replacement)  SBO (small bowel obstruction)    FAMILY HISTORY:  No pertinent family history in first degree relatives    SOCIAL HISTORY: not able to provide.    Allergies  No Known Allergies    MEDICATIONS  (STANDING):  latanoprost 0.005% Ophthalmic Solution 1 Drop(s) Both EYES at bedtime  piperacillin/tazobactam IVPB. 3.375 Gram(s) IV Intermittent every 8 hours    REVIEW OF SYSTEMS: not able to provide history.    Vital Signs Last 24 Hrs  T(C): 36.1 (09 Sep 2017 11:51), Max: 36.1 (08 Sep 2017 18:00)  T(F): 97 (09 Sep 2017 11:51), Max: 97 (08 Sep 2017 18:00)  HR: 90 (09 Sep 2017 11:51) (76 - 90)  BP: 97/65 (09 Sep 2017 11:51) (97/65 - 105/58)  BP(mean): --  RR: 17 (09 Sep 2017 11:51) (16 - 17)  SpO2: 98% (09 Sep 2017 11:51) (98% - 99%)    PHYSICAL EXAM:  GEN:        Awake, responsive and comfortable.  HEENT:    Normal.    RESP:      decreased air entry.  CVS:          Regular rate and rhythm.   ABD:         Soft, non-tender, non-distended;   :             No costovertebral angle tenderness  SKIN:           Warm and dry.  EXTR:            No clubbing, cyanosis or edema  CNS:              Intact sensory and motor function.  PSYCH:        cooperative, no anxiety or depression    LABS:                        10.8   11.7  )-----------( 218      ( 08 Sep 2017 06:52 )             33.9     09-08    135  |  100  |  20  ----------------------------<  49<L>  5.1   |  24  |  0.70    Ca    8.1<L>      08 Sep 2017 06:52  Phos  3.1     09-08  Mg     2.2     09-08    EKG:  A Fib.    RADIOLOGY & ADDITIONAL STUDIES:  < from: Xray Chest 1 View AP/PA. (09.06.17 @ 10:31) >    EXAM:  CHEST SINGLE VIEW                          PROCEDURE DATE:  09/06/2017      INTERPRETATION:  cough    A frontal chest film demonstrates multifocal pneumonia with bilateral   pleural effusions    Heart is enlarged      There is worsening compared with the C5 7 2017 .     The osseous structures appear intact intact.     IMPRESSION:  Bilateral pulmonary infiltrates with bilateral pleural effusions and   cardiomegaly.    CAIO ALICIA M.D., ATTENDING RADIOLOGIST  This document has been electronically signed. Sep  6 2017 11:29AM      ASSESSMENT AND PLAN:  ·	Altered mental status.  ·	Multifocal Pneumonia.  ·	Bilateral pleural effusion.  ·	Hypoglycemia.  ·	Anemia.  ·	A Fib.  ·	S/P AVR.  ·	Severe pHTN.  ·	HTN history.    Continue antibiotics.  Aspiration precautions.

## 2017-09-09 NOTE — PROGRESS NOTE ADULT - ASSESSMENT
Pt is a 89 yo gentleman with a pmhx of CHF, CABG, who presents to the ED with decreased alertness coming from [facility]. He is at baseline responsive and interactive per nephew, but over the past day or so has been less responsive and just staring ahead and not eating. No reported fevers, patient is not speaking currently. Is DNR/DNI.  Admitted for pneumonia. As per HCP, Pt. does not want any antibiotics or further tests. Palliative care consult called and done. Noted. Pt, [facility].  spoke to the HCP today, now pt. wants IV antibiotics and all the tests necessary. Will strart on IV antibiotics, Will get ID and Pulmonary consults.

## 2017-09-10 RX ADMIN — PIPERACILLIN AND TAZOBACTAM 25 GRAM(S): 4; .5 INJECTION, POWDER, LYOPHILIZED, FOR SOLUTION INTRAVENOUS at 16:16

## 2017-09-10 RX ADMIN — PIPERACILLIN AND TAZOBACTAM 25 GRAM(S): 4; .5 INJECTION, POWDER, LYOPHILIZED, FOR SOLUTION INTRAVENOUS at 05:32

## 2017-09-10 RX ADMIN — PIPERACILLIN AND TAZOBACTAM 25 GRAM(S): 4; .5 INJECTION, POWDER, LYOPHILIZED, FOR SOLUTION INTRAVENOUS at 22:50

## 2017-09-10 RX ADMIN — LATANOPROST 1 DROP(S): 0.05 SOLUTION/ DROPS OPHTHALMIC; TOPICAL at 22:50

## 2017-09-10 NOTE — PROGRESS NOTE ADULT - SUBJECTIVE AND OBJECTIVE BOX
Patient is a 89y old  Male who presents with a chief complaint of AMS (07 Sep 2017 10:33)      INTERVAL HPI/OVERNIGHT EVENTS: More alert    MEDICATIONS  (STANDING):  latanoprost 0.005% Ophthalmic Solution 1 Drop(s) Both EYES at bedtime  piperacillin/tazobactam IVPB. 3.375 Gram(s) IV Intermittent every 8 hours    MEDICATIONS  (PRN):      Allergies    No Known Allergies    Intolerances        REVIEW OF SYSTEMS:  CONSTITUTIONAL: No fever,   EYES: No eye pain, visual disturbances, or discharge  ENMT:  No difficulty hearing, tinnitus, vertigo; No sinus or throat pain  NECK: No pain or stiffness  BREASTS: No pain, masses, or nipple discharge  RESPIRATORY: No cough, wheezing, chills or hemoptysis; No shortness of breath  CARDIOVASCULAR: No chest pain, palpitations, dizziness, or leg swelling  GASTROINTESTINAL: No abdominal or epigastric pain. No nausea, vomiting, or hematemesis; No diarrhea or constipation. No melena or hematochezia.  GENITOURINARY: No dysuria, frequency, hematuria, or incontinence  NEUROLOGICAL: No headaches, memory loss, loss of strength, numbness, or tremors  SKIN: No itching, burning, rashes, or lesions   LYMPH NODES: No enlarged glands  ENDOCRINE: No heat or cold intolerance; No hair loss  MUSCULOSKELETAL: No joint pain or swelling; No muscle, back, or extremity pain  PSYCHIATRIC: No depression, anxiety, mood swings, or difficulty sleeping  HEME/LYMPH: No easy bruising, or bleeding gums  ALLERGY AND IMMUNOLOGIC: No hives or eczema    Vital Signs Last 24 Hrs  T(C): 35.4 (10 Sep 2017 06:17), Max: 36.2 (09 Sep 2017 17:07)  T(F): 95.7 (10 Sep 2017 06:17), Max: 97.2 (09 Sep 2017 17:07)  HR: 67 (10 Sep 2017 06:17) (67 - 90)  BP: 107/62 (10 Sep 2017 06:17) (97/65 - 107/62)  BP(mean): --  RR: 18 (10 Sep 2017 06:17) (17 - 18)  SpO2: 100% (10 Sep 2017 06:17) (96% - 100%)    PHYSICAL EXAM:  GENERAL: NAD, well-groomed, well-developed  HEAD:  Atraumatic, Normocephalic  EYES: EOMI, EDGAR, conjunctiva and sclera clear  ENMT:  Moist mucous membranes, Good dentition, No lesions  NECK: Supple, No JVD, Normal thyroid  NERVOUS SYSTEM:  Alert & Oriented X3, Good concentration; Motor Strength 3/5 B/L upper and lower extremities; DTRs 2+ intact and symmetric  CHEST/LUNG: Clear to percussion bilaterally; No rales, rhonchi, wheezing, or rubs  HEART: Regular rate and rhythm; No murmurs, rubs, or gallops  ABDOMEN: Soft, Nontender, Nondistended; Bowel sounds present  EXTREMITIES:  2+ Peripheral Pulses, No clubbing, cyanosis, or edema  LYMPH: No lymphadenopathy noted  SKIN: No rashes or lesions    LABS:              CAPILLARY BLOOD GLUCOSE              Procalcitonin, Serum: 0.16 ng/mL ( @ 11:28)        Urine Culture:      Blood Culture:      RADIOLOGY & ADDITIONAL TESTS:  < from: CT Chest No Cont (17 @ 18:33) >    ******PRELIMINARY REPORT******    ******PRELIMINARY REPORT******          EXAM:  CT CHEST                            PROCEDURE DATE:  2017    ******PRELIMINARY REPORT******    ******PRELIMINARY REPORT******              INTERPRETATION:  [facility] RADIOLOGIST PRELIMINARY REPORT    EXAM:    CT Chest Without Intravenous Contrast    CT Abdomen Without Intravenous Contrast    EXAM DATE/TIME:    Exam ordered 2017 6:22 PM    CLINICAL HISTORY:    89 years old, male; Pain; Chest pain; Type not specified    TECHNIQUE:    Axial computed tomography images of the chest and abdomen without   intravenous   contrast.  All CT scans at this facility use one or more dose reduction   techniques, viz.: automated exposure control; ma/kV adjustment per   patient size   (including targeted exams where dose is matched to indication; i.e.   head); or   iterative reconstruction technique.    MIP reconstructed images were created and reviewed.    Coronal and sagittal reformatted images were created and reviewed.    COMPARISON:     - K CHEST SINGLE VIEW 2017 9:46:48 AM    FINDINGS:   CHEST:    Lungs:  Complete left lower lobe atelectasis.  Subsegmental atelectasis   in   the right lower lobe and lingula.  Multiple tiny clustered noncalcified   nodules   in the left upper lobe may be secondary to prior inflammatory disease or   a   component of small airways disease.  Mild subpleural scarring right   middle lobe.    Pleural space:  Moderate to large bilateral pleural effusions.  No   pneumothorax.    Heart:  Normal heart size.  Aortic valve replacement.  Status post   CABG.  No   significant pericardial effusion.    Mediastinum:  Dependent secretions in the trachea.  1.9 x 1.7 x 1.4 cm   left   anterior mediastinal soft tissue density compatible with a lymph node.     ABDOMEN:    Liver:  Unremarkable.    Gallbladder and bile ducts:  Tiny gallstone.  No ductal dilation.    Pancreas:  Unremarkable.  No ductal dilation.    Spleen:  Unremarkable.  No splenomegaly.    Adrenals:  Unremarkable. No mass.    Kidneys and ureters:  No hydronephrosis.  Small bilateral dense renal   lesions   likely represent complicated cysts.  Small solid nodules are not   excluded.  1.2   cm exophytic left renal cyst.    Stomach and bowel:  Unremarkable.  No obstruction.  No mucosal   thickening.    Intraperitoneal space:  Trace upper abdominal ascites.  No free air.     CHEST and ABDOMEN:    Bones/joints:  Sternotomy wires.  Moderate degenerative change and   dextroscoliosis in the lower thoracic and upper lumbar spine.  No acute   fracture.  No dislocation.    Soft tissues:  Unremarkable.    Vasculature:  Mildly ectatic central pulmonary arteries compatible with   pulmonary artery hypertension.  Moderate atherosclerotic disease.  No   aortic   aneurysm.      IMPRESSION:       1.  Moderate to large bilateral pleural effusions with regions of   atelectasis.    Multiple tiny clustered noncalcified left upper lobe nodules may be post   inflammatory or secondary to small airways disease.    2.  Cholelithiasis.    3.  Trace upper abdominal ascites.    4.  Small bilateral dense renal lesions.  Comparison with prior studies   or   followup ultrasound suggested.    ******PRELIMINARY REPORT******      BRANDEN SPENCER M.D.;AD RADIOLOGIST  < from: TTE Echo Doppler w/o Cont (17 @ 15:33) >     EXAM:  TTE WO CON COMPLETE W DOPPL         PROCEDURE DATE:  2017        INTERPRETATION:  REPORT:    TRANSTHORACIC ECHOCARDIOGRAM REPORT           Patient Name:   JAYLAN TAYLOR Patient Location: [facility] Rec #:  [id number]       Accession #:      [id number]  Account #:                       Height:           63.0 in 160.0 cm  YOB: 1922        Weight:           134.9 lb 61.20 kg  Patient Age:    89 years         BSA:              1.64 m²  Patient Gender: MBP:               106/61 mmHg        Date of Exam: 2017 1:28:26 PM  Sonographer:  KATRIN     Procedure:     2D Echo/Doppler/Color Doppler Complete.  Indications:   Dyspnea, unspecified - R06.00  Diagnosis:     Presence of other heart-valve replacement - Z95.4  Study Details: Technically adequate study.           2D AND M-MODE MEASUREMENTS (normal ranges within parentheses):  Left Ventricle:                 Normal    Aorta/Left Atrium:           Normal  IVSd (2D):              0.83 cm (0.7-1.1) Aortic Root (2D):  2.74 cm   (2.4-3.7)  LVPWd (2D):             0.89 cm (0.7-1.1) Left Atrium (2D):  4.20 cm   (1.9-4.0)  LVIDd (2D):             3.55 cm (3.4-5.7) Right Ventricle:  LVIDs (2D):             2.53 cm           TAPSE:           1.67 cm  LVFS (2D):             28.8 %  (>25%)  Relative Wall Thickness 0.50    (<0.42)     LV DIASTOLIC FUNCTION:  MV Peak E: 1.06 m/s Decel Time: 136 msec  MV Peak A: 0.59 m/s  E/A Ratio: 1.79     SPECTRAL DOPPLER ANALYSIS (where applicable):  Mitral Valve:  MV P1/2 Time: 39.49 msec  MV Area, PHT: 5.57 cm²     Aortic Valve: AoV Max Prabhakar: 2.14 m/s AoV Peak P.4 mmHg AoV Mean P.9 mmHg     LVOT Vmax: 1.02 m/s LVOT VTI: 0.183 m LVOT Diameter:     Tricuspid Valve and PA/RV Systolic Pressure: TR Max Velocity: 3.89 m/s   RA Pressure: 5 mmHg RVSP/PASP: 65.4 mmHg        PHYSICIAN INTERPRETATION:  Left Ventricle: Normal left ventricular size and wall thicknesses, with   normal systolic and diastolic function.  Left ventricular ejection fraction, by visual estimation, is 55 to 60%.  Right Ventricle: Normal right ventricular size and function.  Left Atrium: Mildly enlarged left atrium.  Right Atrium: The right atrium is normal in size.  Pericardium: There is no evidence of pericardial effusion.  Mitral Valve: Structurally normal mitral valve, with normal leaflet   excursion. Thickening and calcification of the anterior mitral valve   leaflet. Moderate mitral valve regurgitation is seen.  Tricuspid Valve: The tricuspid valve is normal in structure.   Moderate-severe tricuspid regurgitation is visualized. Estimated   pulmonary artery systolic pressure is 65.4 mmHg assuming a right atrial   pressure of 5 mmHg, which is consistent with severe pulmonary   hypertension.  Aortic Valve: Normal trileaflet aortic valve with normal opening. Trivial   aortic valve regurgitation is seen. Adequately functioning bioprothesis   in place.  Pulmonic Valve: Structurally normal pulmonic valve, with normal leaflet   excursion. Mild pulmonic valve regurgitation.  Aorta: The aortic root is normal in size and structure.  Pulmonary Artery: The main pulmonary artery is normal in size.        Summary:   1. Left ventricular ejection fraction, by visual estimation, is 55 to   60%.   2. Thickening and calcification of the anterior mitral valve leaflet.   3. Moderate-severe tricuspid regurgitation.   4. Estimated pulmonary artery systolic pressure is 65.4 mmHg assuming a   right atrial pressure of 5 mmHg, which is consistent with severe   pulmonary hypertension.   5. Mildly enlarged left atrium.     [id number] Eris Sutton MD  Electronically signed on 2017 at 4:33:59 PM                *** Final ***                    ERIS SUTTON M.D.; Attending Cardiologist  This document has been electronically signed. 2017  1:28PM                < end of copied text >    Imaging Personally Reviewed:  [ ] YES  [ ] NO    Consultant(s) Notes Reviewed:  [xx ] YES  [ ] NO    Care Discussed with Consultants/Other Providers [ ] YES  [ ] NO    PROBLEMS:  ALTERTED MENTAL STATUS, UNSPECIFIED ALTERTED MENTAL STATUS  SENT FROM [facility] FOR SEPSIS  Altered mental status, unspecified altered mental status type  Pleural effusion, bilateral  Essential hypertension  Altered mental status, unspecified altered mental status type  Pneumonia of both lower lobes due to infectious organism  Severe Pulmonary Hypertension    Care discussed with family,         [ xx ]   yes  [  ]  No    imp:    stable[ ]    unstable[  ]     improving [   ]       unchanged  [  ]                Plans:  Continue present plans  [  ]               New consult [  ]   specialty  .......               order sam[  ]    test name.                  Discharge Planning  [  ]

## 2017-09-10 NOTE — PROGRESS NOTE ADULT - ASSESSMENT
Pt is a 89 yo gentleman with a pmhx of CHF, CABG, who presents to the ED with decreased alertness coming from [facility]. He is at baseline responsive and interactive per nephew, but over the past day or so has been less responsive and just staring ahead and not eating. No reported fevers, patient is not speaking currently. Is DNR/DNI.  Admitted for pneumonia. As per HCP, Pt. does not want any antibiotics or further tests. Palliative care consult called and done. Noted. Pt, [facility].  spoke to the HCP today, now pt. wants IV antibiotics and all the tests necessary. Will strart on IV antibiotics, Will get ID and Pulmonary consults.Pulmonary consult noted. CT sacn chest noted. Will discuss with pulmonary.

## 2017-09-10 NOTE — PROGRESS NOTE ADULT - SUBJECTIVE AND OBJECTIVE BOX
INTERVAL HPI:  Pt is a 89 yo gentleman with a pmhx of CHF, CABG, who presents to the ED with decreased alertness coming from [facility]. He is at baseline responsive and interactive per nephew, but over the past day or so has been less responsive and just staring ahead and not eating. No reported fevers, patient is not speaking currently. Is DNR/DNI.  Admitted for pneumonia (07 Sep 2017 10:33)    OVERNIGHT EVENTS:  Awake, responsive. Feels so so.    Vital Signs Last 24 Hrs  T(C): 36.4 (10 Sep 2017 12:34), Max: 36.4 (10 Sep 2017 12:34)  T(F): 97.6 (10 Sep 2017 12:34), Max: 97.6 (10 Sep 2017 12:34)  HR: 75 (10 Sep 2017 12:34) (67 - 79)  BP: 105/57 (10 Sep 2017 12:34) (101/69 - 107/62)  BP(mean): --  RR: 18 (10 Sep 2017 12:34) (18 - 18)  SpO2: 100% (10 Sep 2017 12:34) (96% - 100%)    PHYSICAL EXAM:  GEN:        Awake, responsive and comfortable.  HEENT:    Normal.    RESP:      no wheezing.  CVS:         Regular rate and rhythm.   ABD:         Soft, non-tender, non-distended;   MEDICATIONS  (STANDING):  latanoprost 0.005% Ophthalmic Solution 1 Drop(s) Both EYES at bedtime  piperacillin/tazobactam IVPB. 3.375 Gram(s) IV Intermittent every 8 hours    ASSESSMENT AND PLAN:  ·	Altered mental status.  ·	Multifocal Pneumonia.  ·	Bilateral pleural effusion.  ·	Hypoglycemia.  ·	Anemia.  ·	A Fib.  ·	S/P AVR.  ·	Severe pHTN.  ·	HTN history.    Continue antibiotics.  Nutritional support.

## 2017-09-10 NOTE — PROGRESS NOTE ADULT - SUBJECTIVE AND OBJECTIVE BOX
HPI:  · HPI  Pt is a 89 yo gentleman with a pmhx of CHF, CABG, who presents to the ED with decreased alertness coming from [facility]. He is at baseline responsive and interactive per nephew, but over the past day or so has been less responsive and just staring ahead and not eating. No reported fevers, patient is not speaking currently. Is DNR/DNI.  Admitted for pneumonia (07 Sep 2017 10:33)  on the 8th family wanted all antibiotics stopped which i did   did not see him after that   now recalled as want antibiotics again     Allergies    No Known Allergies    Intolerances        MEDICATIONS  (STANDING):  latanoprost 0.005% Ophthalmic Solution 1 Drop(s) Both EYES at bedtime  piperacillin/tazobactam IVPB. 3.375 Gram(s) IV Intermittent every 8 hours    MEDICATIONS  (PRN):      REVIEW OF SYSTEMS:    poor historian   feels fine was off antibiotics x 48 hours with no fever short of breath or clinical compromise   pulm noted    VITAL SIGNS:  T(C): 36.4 (09-10-17 @ 12:34), Max: 36.4 (09-10-17 @ 12:34)  T(F): 97.6 (09-10-17 @ 12:34), Max: 97.6 (09-10-17 @ 12:34)  HR: 75 (09-10-17 @ 12:34) (67 - 79)  BP: 105/57 (09-10-17 @ 12:34) (101/69 - 107/62)  RR: 18 (09-10-17 @ 12:34) (18 - 18)  SpO2: 100% (09-10-17 @ 12:34) (96% - 100%)  Wt(kg): --    PHYSICAL EXAM:    GENERAL: not in any distress  HEENT: Neck is supple, normocephalic, atraumatic   CHEST/LUNG: Clear  bilaterally; No rales, rhonchi, wheezing  decreased breath sounds   coughs when i saturation him up   HEART: Regular rate and rhythm; No murmurs, rubs, or gallops  ABDOMEN: Soft, Nontender, Nondistended; Bowel sounds present, no rebound   EXTREMITIES:  2+ Peripheral Pulses, No clubbing, cyanosis, or edema  SKIN: No rashes or lesions  BACK: no pressor sore   NERVOUS SYSTEM:  Alert & responsive     LABS:     none today                                               Radiology:    < from: Xray Chest 1 View AP/PA. (09.06.17 @ 10:31) >  IMPRESSION:  Bilateral pulmonary infiltrates with bilateral pleural effusions and   cardiomegaly.                CAIO ALICIA M.D., ATTENDING RADIOLOGIST  This document has been electronically signed. Sep  6 2017 11:29AM                < end of copied text >

## 2017-09-10 NOTE — PROGRESS NOTE ADULT - ASSESSMENT
[facility] ? chf  no fevers   procalcitonin   antibiotics   will follow with you thanks again   pulm consult noted

## 2017-09-10 NOTE — PROGRESS NOTE ADULT - SUBJECTIVE AND OBJECTIVE BOX
HPI:  · HPI  Pt is a 89 yo gentleman with a pmhx of CHF, CABG, who presents to the ED with decreased alertness coming from [facility]. He is at baseline responsive and interactive per nephew, but over the past day or so has been less responsive and just staring ahead and not eating. No reported fevers, patient is not speaking currently. Is DNR/DNI.  Admitted for pneumonia (07 Sep 2017 10:33)  HPI:        SYMPTOMS---	                   DIDILITY    OTHER REVIEW OF SYSTEMS:  UNABLE TO OBTAIN  due to: SEDATION, CONFUSION    Baseline ADLs (prior to admission):  Independent [ ] moderately [ ] fully   Dependent   [ ] moderately [ ]fully    	                 T(C): 36.2 (09-10-17 @ 17:26), Max: 36.4 (09-10-17 @ 12:34)  T(F): 97.2 (09-10-17 @ 17:26), Max: 97.6 (09-10-17 @ 12:34)  HR: 76 (09-10-17 @ 17:26) (67 - 79)  BP: 128/77 (09-10-17 @ 17:26) (101/69 - 128/77)  RR: 17 (09-10-17 @ 17:26) (17 - 18)  SpO2: 100% (09-10-17 @ 17:26) (96% - 100%)  Wt(kg): --      GENERAL:    EYES : non icteric :               Other:     HEENT:      	atraumatic, normocephalic, PEERLA, sclera anicteric, dry oral mucosa   NECK:          Trach:        Vent:  CVS:          Tachypnic:               Bradycardic:              Normal:		   RESP:        tachypnic:                Dyspenic :                  Comfortable:	  GI:          NGT/PEG 	  :      kidd      	  MUSC:       	Normal	Weakness	  Edema	   NEURO:     	No focal deficit	  Focal  PSYCH:           Alert	Oriented	  Lethargic     SKIN:         	Normal	  Other  LYMPH:      	Normal	  Other  	                     ALLERGIES: No Known Allergies    MEDICATIONS  (STANDING):  latanoprost 0.005% Ophthalmic Solution 1 Drop(s) Both EYES at bedtime  piperacillin/tazobactam IVPB. 3.375 Gram(s) IV Intermittent every 8 hours    MEDICATIONS  (PRN):    	                   LABS REVIEWED                    	  ADVANCED DIRECTIVES:   FULL CODE [   ]  DNR [x  ]    DNI [  ]     MOLST [  ]  PSYCHOSOCIAL-SPIRITUAL ASSESSMENT:       Reviewed       GOALS OF CARE DISCUSSION       Palliative care info/counseling provided	           Family meeting       Advanced Directives addressed	           See previous Palliative Medicine Note  	        REFERRALS	        Palliative Med        Unit SW/Case Mgmt              Speech/Swallow        Hospice             Nutrition         Functional Assessment: KPS:               PPS:      FINAL IMPRESSION AND RECOMMENDATIONS:

## 2017-09-11 ENCOUNTER — TRANSCRIPTION ENCOUNTER (OUTPATIENT)
Age: 82
End: 2017-09-11

## 2017-09-11 LAB
ANION GAP SERPL CALC-SCNC: 10 MMOL/L — SIGNIFICANT CHANGE UP (ref 5–17)
BUN SERPL-MCNC: 19 MG/DL — SIGNIFICANT CHANGE UP (ref 7–23)
CALCIUM SERPL-MCNC: 8.4 MG/DL — LOW (ref 8.5–10.1)
CHLORIDE SERPL-SCNC: 100 MMOL/L — SIGNIFICANT CHANGE UP (ref 96–108)
CO2 SERPL-SCNC: 27 MMOL/L — SIGNIFICANT CHANGE UP (ref 22–31)
CREAT SERPL-MCNC: 0.74 MG/DL — SIGNIFICANT CHANGE UP (ref 0.5–1.3)
GLUCOSE SERPL-MCNC: 77 MG/DL — SIGNIFICANT CHANGE UP (ref 70–99)
HCT VFR BLD CALC: 37.5 % — LOW (ref 39–50)
HGB BLD-MCNC: 12 G/DL — LOW (ref 13–17)
M PNEUMO IGM SER-ACNC: 127 UNITS/ML — SIGNIFICANT CHANGE UP
MCHC RBC-ENTMCNC: 28.7 PG — SIGNIFICANT CHANGE UP (ref 27–34)
MCHC RBC-ENTMCNC: 32.1 GM/DL — SIGNIFICANT CHANGE UP (ref 32–36)
MCV RBC AUTO: 89.5 FL — SIGNIFICANT CHANGE UP (ref 80–100)
MYCOPLASMA AG SPEC QL: NEGATIVE — SIGNIFICANT CHANGE UP
PLATELET # BLD AUTO: 127 K/UL — LOW (ref 150–400)
POTASSIUM SERPL-MCNC: 4.3 MMOL/L — SIGNIFICANT CHANGE UP (ref 3.5–5.3)
POTASSIUM SERPL-SCNC: 4.3 MMOL/L — SIGNIFICANT CHANGE UP (ref 3.5–5.3)
PROCALCITONIN SERPL-MCNC: 0.16 NG/ML — HIGH (ref 0–0.04)
RBC # BLD: 4.19 M/UL — LOW (ref 4.2–5.8)
RBC # FLD: 15.8 % — HIGH (ref 11–15)
SODIUM SERPL-SCNC: 137 MMOL/L — SIGNIFICANT CHANGE UP (ref 135–145)
WBC # BLD: 9.4 K/UL — SIGNIFICANT CHANGE UP (ref 3.8–10.5)
WBC # FLD AUTO: 9.4 K/UL — SIGNIFICANT CHANGE UP (ref 3.8–10.5)

## 2017-09-11 RX ORDER — PIPERACILLIN AND TAZOBACTAM 4; .5 G/20ML; G/20ML
3.38 INJECTION, POWDER, LYOPHILIZED, FOR SOLUTION INTRAVENOUS
Qty: 0 | Refills: 0 | COMMUNITY
Start: 2017-09-11

## 2017-09-11 RX ADMIN — LATANOPROST 1 DROP(S): 0.05 SOLUTION/ DROPS OPHTHALMIC; TOPICAL at 22:47

## 2017-09-11 RX ADMIN — PIPERACILLIN AND TAZOBACTAM 25 GRAM(S): 4; .5 INJECTION, POWDER, LYOPHILIZED, FOR SOLUTION INTRAVENOUS at 06:14

## 2017-09-11 RX ADMIN — PIPERACILLIN AND TAZOBACTAM 25 GRAM(S): 4; .5 INJECTION, POWDER, LYOPHILIZED, FOR SOLUTION INTRAVENOUS at 14:18

## 2017-09-11 RX ADMIN — PIPERACILLIN AND TAZOBACTAM 25 GRAM(S): 4; .5 INJECTION, POWDER, LYOPHILIZED, FOR SOLUTION INTRAVENOUS at 22:47

## 2017-09-11 NOTE — PROGRESS NOTE ADULT - ASSESSMENT
Pt is a 89 yo gentleman with a pmhx of CHF, CABG, who presents to the ED with decreased alertness coming from [facility]. He is at baseline responsive and interactive per nephew, but over the past day or so has been less responsive and just staring ahead and not eating. No reported fevers, patient is not speaking currently. Is DNR/DNI.  Admitted for pneumonia. As per HCP, Pt. does not want any antibiotics or further tests. Palliative care consult called and done. Noted. Pt, referred [facility].  spoke to the HCP today, now pt. wants IV antibiotics and all the tests necessary. Will strart on IV antibiotics, Will get ID and Pulmonary consults.Pulmonary consult noted. CT sacn chest noted, left upper lobe pneumonia.

## 2017-09-11 NOTE — DISCHARGE NOTE ADULT - SECONDARY DIAGNOSIS.
Pleural effusion, bilateral Essential hypertension Altered mental status, unspecified altered mental status type Diastolic heart failure, stage C, chronic Metabolic encephalopathy Pulmonary HTN

## 2017-09-11 NOTE — DISCHARGE NOTE ADULT - HOSPITAL COURSE
Pt is a 89 yo gentleman with a pmhx of CHF, CABG, who presents to the ED with decreased alertness coming from [facility]. He is at baseline responsive and interactive per nephew, but over the past day or so has been less responsive and just staring ahead and not eating. No reported fevers, patient is not speaking currently. Is DNR/DNI.  Admitted for pneumonia. As per HCP, Pt. does not want any antibiotics or further tests. Palliative care consult called and done. Noted. Pt, referred to [facility]osp[facility] care network.  spoke to the HCP today, now pt. wants IV antibiotics and all the tests necessary. Will strart on IV antibiotics, Will get ID and Pulmonary consults.Pulmonary consult noted. CT sacn chest noted, left upper lobe pneumonia.        Problem/Plan - 1:  ·  Problem: Pneumonia of both lower lobes due to infectious organism.  Plan: IV antibiotics.     Problem/Plan - 2:  ·  Problem: Altered mental status, unspecified altered mental status type.  Plan: Observe.     Problem/Plan - 3:  ·  Problem: Essential hypertension.  Plan: Monitor.     Problem/Plan - 4:  ·  Problem: Pleural effusion, bilateral.  Plan: Diuresis. Pt is a 89 yo gentleman with a pmhx of CHF, CABG, who presents to the ED with decreased alertness coming from [facility]. He is at baseline responsive and interactive per nephew, but over the past day or so has been less responsive and just staring ahead and not eating. No reported fevers, patient is not speaking currently. Is DNR/DNI.  Admitted for pneumonia. As per HCP, Pt. does not want any antibiotics or further tests. Palliative care consult called and done. Noted. Pt, referred to [facility]osp[facility] care network.  spoke to the HCP today, now pt. wants IV antibiotics and all the tests necessary. Will strart on IV antibiotics, Will get ID and Pulmonary consults.Pulmonary consult noted. CT sacn chest noted, left upper lobe pneumonia. has bilateral pleural effusion. Will get IR consult for Thoracentesis .HCP refused consent for thoracentesis. Continue IV antibiotics. Discussed with pulmonary, Started on revatio. Pt. stable, Cleared for discharge by ID and Pulmonary. Awaiting auth. from Insurance. Follow BMP and CBC. Does not want to get out of the bed. Discharge as planned.      Problem/Plan - 1:  ·  Problem: Pneumonia of both lower lobes due to infectious organism.  Plan: IV antibiotics.     Problem/Plan - 2:  ·  Problem: Altered mental status, unspecified altered mental status type.  Plan: Observe.     Problem/Plan - 3:  ·  Problem: Essential hypertension.  Plan: Monitor.     Problem/Plan - 4:  ·  Problem: Pleural effusion, bilateral.  Plan: Diuresis.

## 2017-09-11 NOTE — DISCHARGE NOTE ADULT - CARE PLAN
Principal Discharge DX:	Pneumonia of both lower lobes due to infectious organism  Goal:	Continue antibiotics  Instructions for follow-up, activity and diet:	Follow up with PMD  Secondary Diagnosis:	Pleural effusion, bilateral  Goal:	for comfort care  Secondary Diagnosis:	Essential hypertension  Instructions for follow-up, activity and diet:	Continue home blood pressure medications  Follow up with PCP  Low-sodium diet  AHA Recipes - [email or web address]  Secondary Diagnosis:	Altered mental status, unspecified altered mental status type Principal Discharge DX:	Pneumonia of both lower lobes due to infectious organism  Goal:	Resolved  Instructions for follow-up, activity and diet:	Follow up with PMD  Secondary Diagnosis:	Pleural effusion, bilateral  Goal:	Stable  Instructions for follow-up, activity and diet:	Monitor respiratory status  Secondary Diagnosis:	Essential hypertension  Instructions for follow-up, activity and diet:	Continue home blood pressure medications  Follow up with PCP  Low-sodium diet  AHA Recipes - [email or web address]  Secondary Diagnosis:	Altered mental status, unspecified altered mental status type Principal Discharge DX:	Pneumonia of both lower lobes due to infectious organism  Goal:	Resolved  Instructions for follow-up, activity and diet:	Follow up with PMD  Secondary Diagnosis:	Pleural effusion, bilateral  Goal:	Stable  Instructions for follow-up, activity and diet:	Monitor respiratory status  Secondary Diagnosis:	Essential hypertension  Instructions for follow-up, activity and diet:	Continue home blood pressure medications  Follow up with PCP  Low-sodium diet  AHA Recipes - [email or web address]  Secondary Diagnosis:	Altered mental status, unspecified altered mental status type  Goal:	Resolved  Secondary Diagnosis:	Diastolic heart failure, stage C, chronic  Secondary Diagnosis:	Metabolic encephalopathy  Goal:	Resolved  Secondary Diagnosis:	Pulmonary HTN  Instructions for follow-up, activity and diet:	Revatio

## 2017-09-11 NOTE — DISCHARGE NOTE ADULT - CARE PROVIDER_API CALL
Mitzy Newman (AZIZA), Internal Medicine  [address] [place] [address]  Phone: [phone]  Fax: [phone]    Loreto Bethea), Infectious Disease; Internal Medicine  [address] [place] [address]  Phone: [phone]  Fax: [phone]

## 2017-09-11 NOTE — PROGRESS NOTE ADULT - SUBJECTIVE AND OBJECTIVE BOX
Patient is a 89y old  Male who presents with a chief complaint of AMS (07 Sep 2017 10:33)      INTERVAL HPI/OVERNIGHT EVENTS:    MEDICATIONS  (STANDING):  latanoprost 0.005% Ophthalmic Solution 1 Drop(s) Both EYES at bedtime  piperacillin/tazobactam IVPB. 3.375 Gram(s) IV Intermittent every 8 hours    MEDICATIONS  (PRN):      Allergies    No Known Allergies    Intolerances        REVIEW OF SYSTEMS:  CONSTITUTIONAL: No fever,  EYES: No eye pain, visual disturbances, or discharge  ENMT:  No difficulty hearing, tinnitus, vertigo; No sinus or throat pain  NECK: No pain or stiffness  BREASTS: No pain, masses, or nipple discharge  RESPIRATORY: No cough, wheezing, chills or hemoptysis; No shortness of breath  CARDIOVASCULAR: No chest pain, palpitations, dizziness, or leg swelling  GASTROINTESTINAL: No abdominal or epigastric pain. No nausea, vomiting, or hematemesis; No diarrhea or constipation. No melena or hematochezia.  GENITOURINARY: No dysuria, frequency, hematuria, or incontinence  NEUROLOGICAL: No headaches, memory loss, loss of strength, numbness, or tremors  SKIN: No itching, burning, rashes, or lesions   LYMPH NODES: No enlarged glands  ENDOCRINE: No heat or cold intolerance; No hair loss  MUSCULOSKELETAL: No joint pain or swelling; No muscle, back, or extremity pain  PSYCHIATRIC: No depression, anxiety, mood swings, or difficulty sleeping  HEME/LYMPH: No easy bruising, or bleeding gums  ALLERGY AND IMMUNOLOGIC: No hives or eczema    Vital Signs Last 24 Hrs  T(C): 36.6 (11 Sep 2017 05:55), Max: 36.6 (10 Sep 2017 23:19)  T(F): 97.8 (11 Sep 2017 05:55), Max: 97.9 (10 Sep 2017 23:19)  HR: 77 (11 Sep 2017 05:55) (76 - 88)  BP: 101/53 (11 Sep 2017 05:55) (101/53 - 128/77)  BP(mean): --  RR: 16 (11 Sep 2017 05:55) (16 - 17)  SpO2: 100% (11 Sep 2017 05:55) (100% - 100%)    PHYSICAL EXAM:  GENERAL: NAD, well-groomed, well-developed  HEAD:  Atraumatic, Normocephalic  EYES: , EDGAR, conjunctiva and sclera clear  ENMT:  Moist mucous membranes, Good dentition, No lesions  NECK: Supple, No JVD, Normal thyroid  NERVOUS SYSTEM:  Alert & Oriented X3, otor Strength 3/5 B/L upper and lower extremities;   CHEST/LUNG: Clear to percussion bilaterally; No rales, rhonchi, wheezing, or rubs  HEART: Regular rate and rhythm; No murmurs, rubs, or gallops  ABDOMEN: Soft, Nontender, Nondistended; Bowel sounds present  EXTREMITIES:  2+ Peripheral Pulses, No clubbing, cyanosis, or edema  LYMPH: No lymphadenopathy noted  SKIN: No rashes or lesions    LABS:                        12.0   9.4   )-----------( 127      ( 11 Sep 2017 07:14 )             37.5     09-11    137  |  100  |  19  ----------------------------<  77  4.3   |  27  |  0.74    Ca    8.4<L>      11 Sep 2017 07:14          CAPILLARY BLOOD GLUCOSE    Procalcitonin, Serum: 0.16 ng/mL (09-10 @ 22:45)  Procalcitonin, Serum: 0.16 ng/mL (09-07 @ 11:28)        Urine Culture:      Blood Culture:      RADIOLOGY & ADDITIONAL TESTS:  < from: CT Chest No Cont (09.09.17 @ 18:33) >    EXAM:  CT CHEST                            PROCEDURE DATE:  09/09/2017          INTERPRETATION:  Clinical Information: Altered mental status,   pneumonia/CHF. No additional information provided.    Comparison: 04/24/2017    Procedure: Noncontrast CT of the chest with axial, sagittal, coronal, and   axial MIP reconstructions.    Findings:     Airways, pleura, lungs: There is minimal secretions within the trachea.   There is complete atelectasis of the left lower lobe. There is segmental   atelectasis of the right lower lobe. There are moderate bilateral pleural   effusions. There are multiple small nodules in the left upper lobe,   likely infectious. There are multiple bilateral calcified granulomas.    Vasculature: There are aortic and coronary artery calcifications.   Mediastinum and ronda: Status post aortic valve placement. Left atrium is   enlarged. There is a 12 mm anterior mediastinal lymph node, unchanged.  Chest wall and lower neck: Unremarkable.  Imaged upper abdomen: Multiple bilateral renal cysts, some of which are   hyperdense. Colonic diverticulosis. Trace ascites. Nonobstructing   gallstones. Right kidney cortical atrophy. Severe aortic atherosclerosis.  Musculoskeletal: Severe scoliosis and associated degenerative changes.    Impression: Moderate bilateral pleural effusions and left upper lobe   pneumonia.      SHERRON ADAN M.D., ATTENDING RADIOLOGIST  This document has been electronically signed. Sep 10 2017 11:30AM    < end of copied text >    Imaging Personally Reviewed:  [x ] YES  [ ] NO    Consultant(s) Notes Reviewed:  [x ] YES  [ ] NO    Care Discussed with Consultants/Other Providers [ ] YES  [ ] NO    PROBLEMS:  ALTERTED MENTAL STATUS, UNSPECIFIED ALTERTED MENTAL STATUS  SENT FROM [facility] FOR SEPSIS  Altered mental status, unspecified altered mental status type  Pleural effusion, bilateral  Essential hypertension  Altered mental status, unspecified altered mental status type  Pneumonia of both lower lobes due to infectious organism      Care discussed with family,         [  ]   yes  [  ]  No    imp:    stable[ ]    unstable[  ]     improving [   ]       unchanged  [  ]                Plans:  Continue present plans  [  ]               New consult [  ]   specialty  .......               order sam[  ]    test name.                  Discharge Planning  [  ]

## 2017-09-11 NOTE — DISCHARGE NOTE ADULT - PATIENT PORTAL LINK FT
“You can access the [facility] Patient Portal, offered by [facility], by registering with the following website: [email or web address]”

## 2017-09-11 NOTE — DISCHARGE NOTE ADULT - MEDICATION SUMMARY - MEDICATIONS TO TAKE
I will START or STAY ON the medications listed below when I get home from the hospital:    amLODIPine 10 mg oral tablet  -- 1 tab(s) by mouth once a day  -- Indication: For HTN    guaiFENesin 100 mg/5 mL oral liquid  -- 10 milliliter(s) by mouth every 6 hours, As needed, Cough  -- Indication: For Cough    Travatan 0.004% ophthalmic solution  -- 1 drop(s) to each affected eye once a day (in the evening)  -- Indication: For Eye    latanoprost 0.005% ophthalmic solution  -- 1 drop(s) to each affected eye once a day (at bedtime)  -- Indication: For Eye    piperacillin-tazobactam 2 g-0.25 g intravenous injection  -- 3.375 gram(s) intravenous every 8 hours  -- Indication: For PnA    pantoprazole 40 mg oral delayed release tablet  -- 1 tab(s) by mouth once a day (before a meal)  -- Indication: For GERD I will START or STAY ON the medications listed below when I get home from the hospital:    sildenafil 20 mg oral tablet  -- 1 tab(s) by mouth every 8 hours  -- Indication: For Pulmonary HTN    amLODIPine 10 mg oral tablet  -- 1 tab(s) by mouth once a day  -- Indication: For HTN    guaiFENesin 100 mg/5 mL oral liquid  -- 10 milliliter(s) by mouth every 6 hours, As needed, Cough  -- Indication: For Cough    Travatan 0.004% ophthalmic solution  -- 1 drop(s) to each affected eye once a day (in the evening)  -- Indication: For Eye    latanoprost 0.005% ophthalmic solution  -- 1 drop(s) to each affected eye once a day (at bedtime)  -- Indication: For Eye    pantoprazole 40 mg oral delayed release tablet  -- 1 tab(s) by mouth once a day (before a meal)  -- Indication: For GERD

## 2017-09-11 NOTE — DISCHARGE NOTE ADULT - PLAN OF CARE
Continue antibiotics Follow up with PMD for comfort care Continue home blood pressure medications  Follow up with PCP  Low-sodium diet  AHA Recipes - [email or web address] Resolved Stable Monitor respiratory status Revatio

## 2017-09-11 NOTE — PROGRESS NOTE ADULT - SUBJECTIVE AND OBJECTIVE BOX
HPI:  · HPI  Pt is a 89 yo gentleman with a pmhx of CHF, CABG, who presents to the ED with decreased alertness coming from [facility]. He is at baseline responsive and interactive per nephew, but over the past day or so has been less responsive and just staring ahead and not eating. No reported fevers, patient is not speaking currently. Is DNR/DNI.  Admitted for pneumonia (07 Sep 2017 10:33)  on the 8th family wanted all antibiotics stopped which i did   did not see him after that   now recalled as want antibiotics again     Allergies    No Known Allergies    Intolerances        MEDICATIONS  (STANDING):  latanoprost 0.005% Ophthalmic Solution 1 Drop(s) Both EYES at bedtime  piperacillin/tazobactam IVPB. 3.375 Gram(s) IV Intermittent every 8 hours    MEDICATIONS  (PRN):      REVIEW OF SYSTEMS:    poor historian   feels fine was off antibiotics x 48 hours with no fever short of breath or clinical compromise   pulm noted    VITAL SIGNS:  T(C): 36.4 (09-10-17 @ 12:34), Max: 36.4 (09-10-17 @ 12:34)  T(F): 97.6 (09-10-17 @ 12:34), Max: 97.6 (09-10-17 @ 12:34)  HR: 75 (09-10-17 @ 12:34) (67 - 79)  BP: 105/57 (09-10-17 @ 12:34) (101/69 - 107/62)  RR: 18 (09-10-17 @ 12:34) (18 - 18)  SpO2: 100% (09-10-17 @ 12:34) (96% - 100%)  Wt(kg): --    PHYSICAL EXAM:    GENERAL: not in any distress  HEENT: Neck is supple, normocephalic, atraumatic   CHEST/LUNG: Clear  bilaterally; No rales, rhonchi, wheezing  decreased breath sounds   coughs    HEART: Regular rate and rhythm; No murmurs, rubs, or gallops  ABDOMEN: Soft, Nontender, Nondistended; Bowel sounds present, no rebound   EXTREMITIES:  2+ Peripheral Pulses, No clubbing, cyanosis, or edema  SKIN: No rashes or lesions  BACK: no pressor sore   NERVOUS SYSTEM:  Alert & responsive     T(C): 36.2 (09-11-17 @ 17:37), Max: 36.6 (09-10-17 @ 23:19)  T(F): 97.2 (09-11-17 @ 17:37), Max: 97.9 (09-10-17 @ 23:19)  HR: 81 (09-11-17 @ 17:37) (77 - 88)  BP: 115/73 (09-11-17 @ 17:37) (101/53 - 115/73)  RR: 17 (09-11-17 @ 17:37) (16 - 17)  SpO2: 95% (09-11-17 @ 17:37) (95% - 100%)  Wt(kg): --    LABS:                         12.0   9.4   )-----------( 127      ( 11 Sep 2017 07:14 )             37.5     09-11    137  |  100  |  19  ----------------------------<  77  4.3   |  27  |  0.74    Ca    8.4<L>      11 Sep 2017 07:14                                              Radiology:                                                  Radiology:    < from: Xray Chest 1 View AP/PA. (09.06.17 @ 10:31) >  IMPRESSION:  Bilateral pulmonary infiltrates with bilateral pleural effusions and   cardiomegaly.                CAIO ALICIA M.D., ATTENDING RADIOLOGIST  This document has been electronically signed. Sep  6 2017 11:29AM                < end of copied text >

## 2017-09-11 NOTE — PROGRESS NOTE ADULT - ASSESSMENT
health care associated [place] ? congestive heart failure  no fevers   procalcitonin   antibiotics

## 2017-09-11 NOTE — PROGRESS NOTE ADULT - SUBJECTIVE AND OBJECTIVE BOX
INTERVAL HPI:  Pt is a 89 yo gentleman with a pmhx of CHF, CABG, who presents to the ED with decreased alertness coming from [facility]. He is at baseline responsive and interactive per nephew, but over the past day or so has been less responsive and just staring ahead and not eating. No reported fevers, patient is not speaking currently. Is DNR/DNI.  Admitted for pneumonia (07 Sep 2017 10:33)    OVERNIGHT EVENTS:  Comfortable without distress.    Vital Signs Last 24 Hrs  T(C): 36.2 (11 Sep 2017 17:37), Max: 36.6 (10 Sep 2017 23:19)  T(F): 97.2 (11 Sep 2017 17:37), Max: 97.9 (10 Sep 2017 23:19)  HR: 81 (11 Sep 2017 17:37) (77 - 88)  BP: 115/73 (11 Sep 2017 17:37) (101/53 - 115/73)  BP(mean): --  RR: 17 (11 Sep 2017 17:37) (16 - 17)  SpO2: 95% (11 Sep 2017 17:37) (95% - 100%)    PHYSICAL EXAM:  GEN:         Awake, responsive and comfortable.  HEENT:    Normal.    RESP:      no wheezing.  CVS:         Regular rate and rhythm.   ABD:         Soft, non-tender, non-distended;     MEDICATIONS  (STANDING):  latanoprost 0.005% Ophthalmic Solution 1 Drop(s) Both EYES at bedtime  piperacillin/tazobactam IVPB. 3.375 Gram(s) IV Intermittent every 8 hours    LABS:                        12.0   9.4   )-----------( 127      ( 11 Sep 2017 07:14 )             37.5     09-11    137  |  100  |  19  ----------------------------<  77  4.3   |  27  |  0.74    Ca    8.4<L>      11 Sep 2017 07:14    ASSESSMENT AND PLAN:  ·	Altered mental status.  ·	Multifocal Pneumonia.  ·	Bilateral pleural effusion.  ·	Hypoglycemia.  ·	Anemia.  ·	A Fib.  ·	S/P AVR.  ·	Severe pHTN.  ·	HTN history.    On Zosyn.  Encourage PO intake.

## 2017-09-11 NOTE — DISCHARGE NOTE ADULT - MEDICATION SUMMARY - MEDICATIONS TO STOP TAKING
I will STOP taking the medications listed below when I get home from the hospital:  None I will STOP taking the medications listed below when I get home from the hospital:    cefTRIAXone  -- 1 gram(s) intravenous once a day

## 2017-09-12 DIAGNOSIS — G93.41 METABOLIC ENCEPHALOPATHY: ICD-10-CM

## 2017-09-12 DIAGNOSIS — I27.2 OTHER SECONDARY PULMONARY HYPERTENSION: ICD-10-CM

## 2017-09-12 DIAGNOSIS — I48.2 CHRONIC ATRIAL FIBRILLATION: ICD-10-CM

## 2017-09-12 DIAGNOSIS — I50.32 CHRONIC DIASTOLIC (CONGESTIVE) HEART FAILURE: ICD-10-CM

## 2017-09-12 LAB
CULTURE RESULTS: SIGNIFICANT CHANGE UP
CULTURE RESULTS: SIGNIFICANT CHANGE UP
INR BLD: 1.13 RATIO — SIGNIFICANT CHANGE UP (ref 0.88–1.16)
PROTHROM AB SERPL-ACNC: 12.4 SEC — SIGNIFICANT CHANGE UP (ref 9.8–12.7)
SPECIMEN SOURCE: SIGNIFICANT CHANGE UP
SPECIMEN SOURCE: SIGNIFICANT CHANGE UP

## 2017-09-12 PROCEDURE — 71010: CPT | Mod: 26

## 2017-09-12 RX ADMIN — PIPERACILLIN AND TAZOBACTAM 25 GRAM(S): 4; .5 INJECTION, POWDER, LYOPHILIZED, FOR SOLUTION INTRAVENOUS at 22:17

## 2017-09-12 RX ADMIN — LATANOPROST 1 DROP(S): 0.05 SOLUTION/ DROPS OPHTHALMIC; TOPICAL at 22:17

## 2017-09-12 RX ADMIN — PIPERACILLIN AND TAZOBACTAM 25 GRAM(S): 4; .5 INJECTION, POWDER, LYOPHILIZED, FOR SOLUTION INTRAVENOUS at 16:01

## 2017-09-12 RX ADMIN — PIPERACILLIN AND TAZOBACTAM 25 GRAM(S): 4; .5 INJECTION, POWDER, LYOPHILIZED, FOR SOLUTION INTRAVENOUS at 05:08

## 2017-09-12 NOTE — CONSULT NOTE ADULT - SUBJECTIVE AND OBJECTIVE BOX
MEDICAL RECORD REVIEWED; HISTORY AND  REVIEW OF SYSTEMS OBTAINED; PATIENT EXAMINED:    89 YEAR OLD  MALE WITH PNEUMONIA, ATELECTASIS, PLEURAL EFFUSIONS, ATRIAL FIBRILLATION, DNR/DNI; SP CABG/AVR. ALL LABS/RADIOLOGY/MEDICATIONS REVIEWED; FRAIL, NEARLY MORIBUND, NO JVD, PROSTHETIC S1, NON TENDER NO EDEMA; ECG: ATRIAL FIBRILLATION RBBB, NO ISCHEMIC CHANGES    AGREE WITH PRESENT MANAGEMENT   SUPPORTIVE COMFORT CARE    THANK YOU    WILBUR BAUTISTA MD, FACC

## 2017-09-12 NOTE — PHYSICAL THERAPY INITIAL EVALUATION ADULT - PERTINENT HX OF CURRENT PROBLEM, REHAB EVAL
Pt is a 89yo M admitted from rehab secondary to AMS. Work up revealed PNA. CT chest: moderate bilateral pleural effusions and left upper lobe pneumonia.

## 2017-09-12 NOTE — PROGRESS NOTE ADULT - SUBJECTIVE AND OBJECTIVE BOX
INTERVAL HPI:  Pt is a 89 yo gentleman with a pmhx of CHF, CABG, who presents to the ED with decreased alertness coming from [facility]. He is at baseline responsive and interactive per nephew, but over the past day or so has been less responsive and just staring ahead and not eating. No reported fevers, patient is not speaking currently. Is DNR/DNI.  Admitted for pneumonia (07 Sep 2017 10:33)    OVERNIGHT EVENTS:  Sleeping, arouse able.    Vital Signs Last 24 Hrs  T(C): 36.6 (12 Sep 2017 12:20), Max: 36.6 (12 Sep 2017 12:20)  T(F): 97.9 (12 Sep 2017 12:20), Max: 97.9 (12 Sep 2017 12:20)  HR: 77 (12 Sep 2017 12:20) (77 - 86)  BP: 102/66 (12 Sep 2017 12:20) (102/66 - 115/73)  BP(mean): --  RR: 16 (12 Sep 2017 12:20) (16 - 18)  SpO2: 98% (12 Sep 2017 12:20) (94% - 98%)    PHYSICAL EXAM:  GEN:        responsive and comfortable.  HEENT:    Normal.    RESP:       crackles.  CVS:         Regular rate and rhythm.   ABD:         Soft, non-tender, non-distended;     MEDICATIONS  (STANDING):  latanoprost 0.005% Ophthalmic Solution 1 Drop(s) Both EYES at bedtime  piperacillin/tazobactam IVPB. 3.375 Gram(s) IV Intermittent every 8 hours    LABS:                        12.0   9.4   )-----------( 127      ( 11 Sep 2017 07:14 )             37.5     09-11    137  |  100  |  19  ----------------------------<  77  4.3   |  27  |  0.74    Ca    8.4<L>      11 Sep 2017 07:14    PT/INR - ( 12 Sep 2017 12:35 )   PT: 12.4 sec;   INR: 1.13 ratio       ASSESSMENT AND PLAN:  ·	Altered mental status.  ·	Multifocal Pneumonia.  ·	Bilateral pleural effusion.  ·	Hypoglycemia.  ·	Anemia.  ·	A Fib.  ·	S/P AVR.  ·	Severe pHTN.  ·	HTN history.    Continue antibiotics.

## 2017-09-12 NOTE — PHYSICAL THERAPY INITIAL EVALUATION ADULT - CRITERIA FOR SKILLED THERAPEUTIC INTERVENTIONS
functional limitations in following categories/impairments found/risk reduction/prevention/therapy frequency

## 2017-09-12 NOTE — PROGRESS NOTE ADULT - ASSESSMENT
[facility] ? congestive heart failure  no fevers   procalcitonin is fairly low   antibiotics to be dcd by am day 5 tomorrow after eresumption   got also 9/6-9/7   chest xray status quo

## 2017-09-12 NOTE — PROGRESS NOTE ADULT - SUBJECTIVE AND OBJECTIVE BOX
pt more aler and oriented   spoke w me abput jis   care   wants current tyx continued   overall prognosis is poor   has feet pain mild   DNR

## 2017-09-12 NOTE — PHYSICAL THERAPY INITIAL EVALUATION ADULT - ADDITIONAL COMMENTS
Pt is a resident at [facility] where he requires assistance for all functional mobility. Pt owns both straight cane & rolling walker. Pt's nephew had requested hospice care initially at time of admission & declined PT eval. Cardiology, ID, & Pulmonology consulted. Palliative care consulted. Hospice consult reviewed. Pt not a candidate for inpatient hospice. Nephew now requesting PT evaluation for rehab placement. Pt with recent admission to [facility] from 4/25-5/2 with discharge to [facility] for subacute rehab. At some point during his stay he was transferred to [facility] for another medical reason & subsequently returned to [facility] on 8/16 for rehab. Pt states since his recent return to rehab he has required 1 person assist or use of lift for OOB to chair, no ambulation. PT reviewed documentation from [facility] which confirms this level of assistance. Pt agreeable to returning to rehab but endorses that he would like to receive a list of other facilities. LISA Khalil informed.

## 2017-09-12 NOTE — PROGRESS NOTE ADULT - SUBJECTIVE AND OBJECTIVE BOX
Pt is a 89 yo gentleman with a pmhx of CHF, CABG, who presents to the ED with decreased alertness coming from [facility]. He is at baseline responsive and interactive per nephew, but over the past day or so has been less responsive and just staring ahead and not eating. No reported fevers, patient is not speaking currently. Is DNR/DNI.  Admitted for pneumonia (07 Sep 2017 10:33)  back on antibiotics as of 9/9/2017  Allergies    No Known Allergies    Intolerances        MEDICATIONS  (STANDING):  latanoprost 0.005% Ophthalmic Solution 1 Drop(s) Both EYES at bedtime  piperacillin/tazobactam IVPB. 3.375 Gram(s) IV Intermittent every 8 hours    MEDICATIONS  (PRN):      REVIEW OF SYSTEMS:    feels better   poor appetite only likes to drink ensure   VITAL SIGNS:  T(C): 36.6 (09-12-17 @ 17:02), Max: 36.6 (09-12-17 @ 12:20)  T(F): 97.8 (09-12-17 @ 17:02), Max: 97.9 (09-12-17 @ 12:20)  HR: 87 (09-12-17 @ 17:02) (77 - 87)  BP: 113/62 (09-12-17 @ 17:02) (102/66 - 113/62)  RR: 16 (09-12-17 @ 17:02) (16 - 18)  SpO2: 93% (09-12-17 @ 17:02) (93% - 98%)  Wt(kg): --    PHYSICAL EXAM:    GENERAL: not in any distress  HEENT: Neck is supple, normocephalic, atraumatic   CHEST/LUNG: Clear  bilaterally; No rales, rhonchi, wheezing  HEART: Regular rate and rhythm; No murmurs, rubs, or gallops  ABDOMEN: Soft, Nontender, Nondistended; Bowel sounds present, no rebound   EXTREMITIES:  2+ Peripheral Pulses, No clubbing, cyanosis, or edema  SKIN: No rashes or lesions  BACK: no pressor sore   NERVOUS SYSTEM:  Alert & Oriented X2  LABS:                         12.0   9.4   )-----------( 127      ( 11 Sep 2017 07:14 )             37.5     09-11    137  |  100  |  19  ----------------------------<  77  4.3   |  27  |  0.74    Ca    8.4<L>      11 Sep 2017 07:14        PT/INR - ( 12 Sep 2017 12:35 )   PT: 12.4 sec;   INR: 1.13 ratio                                               Radiology:    < from: Xray Chest 1 View AP/PA. (09.12.17 @ 11:07) >  EXAM:  CHEST SINGLE VIEW                            PROCEDURE DATE:  09/12/2017          INTERPRETATION:  PROCEDURE: AP view of the chest.    CLINICAL INFORMATION: Pneumonia    COMPARISON: 9/6/2017    FINDINGS:     The patient is status post sternotomy and cardiac valve replacement.    There are bilateral lung opacities and pleural effusions without   significant change. The cardiac and mediastinal contours are prominent,   which may be due to magnification from AP technique and shallow   inspiration. The osseous structures are intact.    IMPRESSION:    Bilateral lung opacities and pleural effusions without significant change.                  DEJON GOOD M.D., ATTENDING RADIOLOGIST  This document has been electronically signed. Sep 12 2017  1:23PM                < end of copied text >

## 2017-09-12 NOTE — PROGRESS NOTE ADULT - ASSESSMENT
Pt is a 89 yo gentleman with a pmhx of CHF, CABG, who presents to the ED with decreased alertness coming from [facility]. He is at baseline responsive and interactive per nephew, but over the past day or so has been less responsive and just staring ahead and not eating. No reported fevers, patient is not speaking currently. Is DNR/DNI.  Admitted for pneumonia. As per HCP, Pt. does not want any antibiotics or further tests. Palliative care consult called and done. Noted. Pt, referred to Hospice care network.  spoke to the HCP today, now pt. wants IV antibiotics and all the tests necessary. Will strart on IV antibiotics, Will get ID and Pulmonary consults.Pulmonary consult noted. CT sacn chest noted, left upper lobe pneumonia. has bilateral pleural effusion. Will get IR consult for Thoracentesis.

## 2017-09-12 NOTE — PROGRESS NOTE ADULT - SUBJECTIVE AND OBJECTIVE BOX
Patient is a 89y old  Male who presents with a chief complaint of AMS (11 Sep 2017 14:45)      INTERVAL HPI/OVERNIGHT EVENTS: More alert    MEDICATIONS  (STANDING):  latanoprost 0.005% Ophthalmic Solution 1 Drop(s) Both EYES at bedtime  piperacillin/tazobactam IVPB. 3.375 Gram(s) IV Intermittent every 8 hours    MEDICATIONS  (PRN):      Allergies    No Known Allergies    Intolerances        REVIEW OF SYSTEMS:  CONSTITUTIONAL: No fever,  EYES: No eye pain, visual disturbances, or discharge  ENMT:  No difficulty hearing, tinnitus, vertigo; No sinus or throat pain  NECK: No pain or stiffness  BREASTS: No pain, masses, or nipple discharge  RESPIRATORY: No cough, wheezing, chills or hemoptysis; No shortness of breath  CARDIOVASCULAR: No chest pain, palpitations, dizziness, or leg swelling  GASTROINTESTINAL: No abdominal or epigastric pain. No nausea, vomiting, or hematemesis; No diarrhea or constipation. No melena or hematochezia.  GENITOURINARY: No dysuria, frequency, hematuria, or incontinence  NEUROLOGICAL: No headaches, memory loss, loss of strength, numbness, or tremors  SKIN: No itching, burning, rashes, or lesions   LYMPH NODES: No enlarged glands  ENDOCRINE: No heat or cold intolerance; No hair loss  MUSCULOSKELETAL: No joint pain or swelling; No muscle, back, or extremity pain  PSYCHIATRIC: No depression, anxiety, mood swings, or difficulty sleeping  HEME/LYMPH: No easy bruising, or bleeding gums  ALLERGY AND IMMUNOLOGIC: No hives or eczema    Vital Signs Last 24 Hrs  T(C): 36.2 (12 Sep 2017 05:05), Max: 36.3 (11 Sep 2017 23:42)  T(F): 97.2 (12 Sep 2017 05:05), Max: 97.4 (11 Sep 2017 23:42)  HR: 86 (12 Sep 2017 05:05) (81 - 86)  BP: 105/66 (12 Sep 2017 05:05) (105/66 - 115/73)  BP(mean): --  RR: 16 (12 Sep 2017 05:05) (16 - 17)  SpO2: 97% (12 Sep 2017 05:05) (95% - 100%)    PHYSICAL EXAM:  GENERAL: NAD,Cachectic  HEAD:  Atraumatic, Normocephalic  EYES: EOMI, PERRLA, conjunctiva and sclera clear  ENMT:  Moist mucous membranes, Good dentition, No lesions  NECK: Supple, No JVD, Normal thyroid  NERVOUS SYSTEM:  Alert & Oriented X3, Good concentration; Motor Strength 3/5 B/L upper and lower extremities; DTRs 2+ intact and symmetric  CHEST/LUNG: Clear to percussion bilaterally; No rales, rhonchi, wheezing, or rubs  HEART: Regular rate and rhythm; No murmurs, rubs, or gallops  ABDOMEN: Soft, Nontender, Nondistended; Bowel sounds present  EXTREMITIES:  2+ Peripheral Pulses, No clubbing, cyanosis, or edema  LYMPH: No lymphadenopathy noted  SKIN: No rashes or lesions    LABS:                        12.0   9.4   )-----------( 127      ( 11 Sep 2017 07:14 )             37.5     09-11    137  |  100  |  19  ----------------------------<  77  4.3   |  27  |  0.74    Ca    8.4<L>      11 Sep 2017 07:14          CAPILLARY BLOOD GLUCOSE              Procalcitonin, Serum: 0.16 ng/mL (09-10 @ 22:45)  Procalcitonin, Serum: 0.16 ng/mL (09-07 @ 11:28)        Urine Culture:      Blood Culture:      RADIOLOGY & ADDITIONAL TESTS:  < from: CT Chest No Cont (09.09.17 @ 18:33) >    EXAM:  CT CHEST                            PROCEDURE DATE:  09/09/2017          INTERPRETATION:  Clinical Information: Altered mental status,   pneumonia/CHF. No additional information provided.    Comparison: 04/24/2017    Procedure: Noncontrast CT of the chest with axial, sagittal, coronal, and   axial MIP reconstructions.    Findings:     Airways, pleura, lungs: There is minimal secretions within the trachea.   There is complete atelectasis of the left lower lobe. There is segmental   atelectasis of the right lower lobe. There are moderate bilateral pleural   effusions. There are multiple small nodules in the left upper lobe,   likely infectious. There are multiple bilateral calcified granulomas.    Vasculature: There are aortic and coronary artery calcifications.   Mediastinum and ronda: Status post aortic valve placement. Left atrium is   enlarged. There is a 12 mm anterior mediastinal lymph node, unchanged.  Chest wall and lower neck: Unremarkable.  Imaged upper abdomen: Multiple bilateral renal cysts, some of which are   hyperdense. Colonic diverticulosis. Trace ascites. Nonobstructing   gallstones. Right kidney cortical atrophy. Severe aortic atherosclerosis.  Musculoskeletal: Severe scoliosis and associated degenerative changes.    Impression: Moderate bilateral pleural effusions and left upper lobe pneumonia.    SHERRON ADAN M.D., ATTENDING RADIOLOGIST  This document has been electronically signed. Sep 10 2017 11:30AM      < end of copied text >    Imaging Personally Reviewed:  [x ] YES  [ ] NO    Consultant(s) Notes Reviewed:  [ ] YES  [ ] NO    Care Discussed with Consultants/Other Providers [ ] YES  [ ] NO    PROBLEMS:  ALTERTED MENTAL STATUS, UNSPECIFIED ALTERTED MENTAL STATUS  SENT FROM [facility] FOR SEPSIS  Pneumonia of both lower lobes due to infectious organism  Altered mental status, unspecified altered mental status type  Pleural effusion, bilateral  Essential hypertension  Altered mental status, unspecified altered mental status type  Pneumonia of both lower lobes due to infectious organism      Care discussed with family,         [  ]   yes  [  ]  No    imp:    stable[ ]    unstable[  ]     improving [   ]       unchanged  [  ]                Plans:  Continue present plans  [  ]               New consult [  ]   specialty  .......               order sam[  ]    test name.                  Discharge Planning  [  ]

## 2017-09-13 LAB
ANION GAP SERPL CALC-SCNC: 11 MMOL/L — SIGNIFICANT CHANGE UP (ref 5–17)
BUN SERPL-MCNC: 21 MG/DL — SIGNIFICANT CHANGE UP (ref 7–23)
CALCIUM SERPL-MCNC: 8.4 MG/DL — LOW (ref 8.5–10.1)
CHLORIDE SERPL-SCNC: 99 MMOL/L — SIGNIFICANT CHANGE UP (ref 96–108)
CO2 SERPL-SCNC: 29 MMOL/L — SIGNIFICANT CHANGE UP (ref 22–31)
CREAT SERPL-MCNC: 1.02 MG/DL — SIGNIFICANT CHANGE UP (ref 0.5–1.3)
GLUCOSE SERPL-MCNC: 84 MG/DL — SIGNIFICANT CHANGE UP (ref 70–99)
HCT VFR BLD CALC: 34.3 % — LOW (ref 39–50)
HGB BLD-MCNC: 11.3 G/DL — LOW (ref 13–17)
MCHC RBC-ENTMCNC: 29 PG — SIGNIFICANT CHANGE UP (ref 27–34)
MCHC RBC-ENTMCNC: 32.9 GM/DL — SIGNIFICANT CHANGE UP (ref 32–36)
MCV RBC AUTO: 88 FL — SIGNIFICANT CHANGE UP (ref 80–100)
PLATELET # BLD AUTO: 167 K/UL — SIGNIFICANT CHANGE UP (ref 150–400)
POTASSIUM SERPL-MCNC: 2.9 MMOL/L — CRITICAL LOW (ref 3.5–5.3)
POTASSIUM SERPL-MCNC: 3.4 MMOL/L — LOW (ref 3.5–5.3)
POTASSIUM SERPL-SCNC: 2.9 MMOL/L — CRITICAL LOW (ref 3.5–5.3)
POTASSIUM SERPL-SCNC: 3.4 MMOL/L — LOW (ref 3.5–5.3)
RBC # BLD: 3.9 M/UL — LOW (ref 4.2–5.8)
RBC # FLD: 15.7 % — HIGH (ref 11–15)
SODIUM SERPL-SCNC: 139 MMOL/L — SIGNIFICANT CHANGE UP (ref 135–145)
WBC # BLD: 13.3 K/UL — HIGH (ref 3.8–10.5)
WBC # FLD AUTO: 13.3 K/UL — HIGH (ref 3.8–10.5)

## 2017-09-13 RX ORDER — POTASSIUM CHLORIDE 20 MEQ
10 PACKET (EA) ORAL
Qty: 0 | Refills: 0 | Status: COMPLETED | OUTPATIENT
Start: 2017-09-13 | End: 2017-09-13

## 2017-09-13 RX ORDER — POTASSIUM CHLORIDE 20 MEQ
40 PACKET (EA) ORAL ONCE
Qty: 0 | Refills: 0 | Status: COMPLETED | OUTPATIENT
Start: 2017-09-13 | End: 2017-09-13

## 2017-09-13 RX ADMIN — PIPERACILLIN AND TAZOBACTAM 25 GRAM(S): 4; .5 INJECTION, POWDER, LYOPHILIZED, FOR SOLUTION INTRAVENOUS at 22:50

## 2017-09-13 RX ADMIN — Medication 40 MILLIEQUIVALENT(S): at 13:10

## 2017-09-13 RX ADMIN — Medication 100 MILLIEQUIVALENT(S): at 08:55

## 2017-09-13 RX ADMIN — Medication 100 MILLIEQUIVALENT(S): at 08:53

## 2017-09-13 RX ADMIN — Medication 100 MILLIEQUIVALENT(S): at 08:54

## 2017-09-13 RX ADMIN — PIPERACILLIN AND TAZOBACTAM 25 GRAM(S): 4; .5 INJECTION, POWDER, LYOPHILIZED, FOR SOLUTION INTRAVENOUS at 05:56

## 2017-09-13 RX ADMIN — PIPERACILLIN AND TAZOBACTAM 25 GRAM(S): 4; .5 INJECTION, POWDER, LYOPHILIZED, FOR SOLUTION INTRAVENOUS at 13:11

## 2017-09-13 RX ADMIN — LATANOPROST 1 DROP(S): 0.05 SOLUTION/ DROPS OPHTHALMIC; TOPICAL at 22:50

## 2017-09-13 NOTE — PROGRESS NOTE ADULT - SUBJECTIVE AND OBJECTIVE BOX
Left thoracentesis requested by the primary team. I called the patient's healthcare proxy (Dr. Lakhwinder Atwood) to obtain consent for the procedure, however, Dr. Atwood believes that this procedure should not be performed at this time. This was discussed with Dr. Carson.

## 2017-09-13 NOTE — PROGRESS NOTE ADULT - SUBJECTIVE AND OBJECTIVE BOX
· Subjective and Objective: 	  Patient is a 89y old  Male who presents with a chief complaint of AMS (11 Sep 2017 14:45)      INTERVAL HPI/OVERNIGHT EVENTS: lethargic  no real change   overall prognosis poor   sx controlled   DNR

## 2017-09-13 NOTE — PROGRESS NOTE ADULT - ASSESSMENT
Pt is a 89 yo gentleman with a pmhx of CHF, CABG, who presents to the ED with decreased alertness coming from [facility]. He is at baseline responsive and interactive per nephew, but over the past day or so has been less responsive and just staring ahead and not eating. No reported fevers, patient is not speaking currently. Is DNR/DNI.  Admitted for pneumonia. As per HCP, Pt. does not want any antibiotics or further tests. Palliative care consult called and done. Noted. Pt, referred to Hospice care network.  spoke to the HCP today, now pt. wants IV antibiotics and all the tests necessary. Will strart on IV antibiotics, Will get ID and Pulmonary consults.Pulmonary consult noted. CT sacn chest noted, left upper lobe pneumonia. has bilateral pleural effusion. Will get IR consult for Thoracentesis .HCP refused consent for thoracentesis. Continue IV antibiotics.

## 2017-09-13 NOTE — PROGRESS NOTE ADULT - ASSESSMENT
[facility] ? congestive heart failure  no fevers   procalcitonin is fairly low   antibiotics to be dcd by am day 5 tomorrow after eresumption   got also 9/6-9/7   chest xray status quo [facility] ? congestive heart failure  no fevers   procalcitonin is fairly low   antibiotics to be dcd by am day 5 tomorrow after resumption of antibiotics   got also 9/6-9/7   chest xray status quo   likey dc antibiotics am   to be discussed with pulm

## 2017-09-13 NOTE — PROGRESS NOTE ADULT - SUBJECTIVE AND OBJECTIVE BOX
Patient is a 89y old  Male who presents with a chief complaint of AMS (11 Sep 2017 14:45)      INTERVAL HPI/OVERNIGHT EVENTS: lethargic    MEDICATIONS  (STANDING):  latanoprost 0.005% Ophthalmic Solution 1 Drop(s) Both EYES at bedtime  piperacillin/tazobactam IVPB. 3.375 Gram(s) IV Intermittent every 8 hours    MEDICATIONS  (PRN):      Allergies    No Known Allergies    Intolerances        REVIEW OF SYSTEMS: Unable to provide  CONSTITUTIONAL: No fever, weight loss, or fatigue  EYES: No eye pain, visual disturbances, or discharge  ENMT:  No difficulty hearing, tinnitus, vertigo; No sinus or throat pain  NECK: No pain or stiffness  BREASTS: No pain, masses, or nipple discharge  RESPIRATORY: No cough, wheezing, chills or hemoptysis; No shortness of breath  CARDIOVASCULAR: No chest pain, palpitations, dizziness, or leg swelling  GASTROINTESTINAL: No abdominal or epigastric pain. No nausea, vomiting, or hematemesis; No diarrhea or constipation. No melena or hematochezia.  GENITOURINARY: No dysuria, frequency, hematuria, or incontinence  NEUROLOGICAL: No headaches, memory loss, loss of strength, numbness, or tremors  SKIN: No itching, burning, rashes, or lesions   LYMPH NODES: No enlarged glands  ENDOCRINE: No heat or cold intolerance; No hair loss  MUSCULOSKELETAL: No joint pain or swelling; No muscle, back, or extremity pain  PSYCHIATRIC: No depression, anxiety, mood swings, or difficulty sleeping  HEME/LYMPH: No easy bruising, or bleeding gums  ALLERGY AND IMMUNOLOGIC: No hives or eczema    Vital Signs Last 24 Hrs  T(C): 36.1 (13 Sep 2017 12:51), Max: 36.6 (12 Sep 2017 17:02)  T(F): 97 (13 Sep 2017 12:51), Max: 97.8 (12 Sep 2017 17:02)  HR: 82 (13 Sep 2017 12:51) (77 - 87)  BP: 119/62 (13 Sep 2017 12:51) (106/66 - 119/62)  BP(mean): --  RR: 16 (13 Sep 2017 12:51) (16 - 18)  SpO2: 96% (13 Sep 2017 12:51) (93% - 96%)    PHYSICAL EXAM:  GENERAL: ,Cachectic, Lethargic  EYES: , Conjunctiva and sclera clear  ENMT:  Moist mucous membranes,  No lesions  NECK: Supple, No JVD, Normal thyroid  NERVOUS SYSTEM:  Alert & Oriented X2, Good concentration; Motor Strength 3/5 B/L upper and lower extremities;CHEST/LUNG: Clear to percussion bilaterally; No rales, rhonchi, wheezing, or rubs  HEART: Regular rate and rhythm; No murmurs, rubs, or gallops  ABDOMEN: Soft, Nontender, Nondistended; Bowel sounds present  EXTREMITIES:  2+ Peripheral Pulses, No clubbing, cyanosis, or edema  LYMPH: No lymphadenopathy noted  SKIN: No rashes or lesions    LABS:                        11.3   13.3  )-----------( 167      ( 13 Sep 2017 06:26 )             34.3     09-13    139  |  99  |  21  ----------------------------<  84  2.9<LL>   |  29  |  1.02    Ca    8.4<L>      13 Sep 2017 06:26      PT/INR - ( 12 Sep 2017 12:35 )   PT: 12.4 sec;   INR: 1.13 ratio             CAPILLARY BLOOD GLUCOSE              Procalcitonin, Serum: 0.16 ng/mL (09-10 @ 22:45)  Procalcitonin, Serum: 0.16 ng/mL (09-07 @ 11:28)        Urine Culture:      Blood Culture:      RADIOLOGY & ADDITIONAL TESTS:  < from: CT Chest No Cont (09.09.17 @ 18:33) >    EXAM:  CT CHEST                            PROCEDURE DATE:  09/09/2017          INTERPRETATION:  Clinical Information: Altered mental status,   pneumonia/CHF. No additional information provided.    Comparison: 04/24/2017    Procedure: Noncontrast CT of the chest with axial, sagittal, coronal, and   axial MIP reconstructions.    Findings:     Airways, pleura, lungs: There is minimal secretions within the trachea.   There is complete atelectasis of the left lower lobe. There is segmental   atelectasis of the right lower lobe. There are moderate bilateral pleural   effusions. There are multiple small nodules in the left upper lobe,   likely infectious. There are multiple bilateral calcified granulomas.    Vasculature: There are aortic and coronary artery calcifications.   Mediastinum and ronda: Status post aortic valve placement. Left atrium is   enlarged. There is a 12 mm anterior mediastinal lymph node, unchanged.  Chest wall and lower neck: Unremarkable.  Imaged upper abdomen: Multiple bilateral renal cysts, some of which are   hyperdense. Colonic diverticulosis. Trace ascites. Nonobstructing   gallstones. Right kidney cortical atrophy. Severe aortic atherosclerosis.  Musculoskeletal: Severe scoliosis and associated degenerative changes.    Impression: Moderate bilateral pleural effusions and left upper lobe   pneumonia.      SHERRON ADAN M.D., ATTENDING RADIOLOGIST  This document has been electronically signed. Sep 10 2017 11:30AM      < end of copied text >    Imaging Personally Reviewed:  [xx ] YES  [ ] NO    Consultant(s) Notes Reviewed:  [xx ] YES  [ ] NO    Care Discussed with Consultants/Other Providers [ ] YES  [ ] NO    PROBLEMS:  ALTERTED MENTAL STATUS, UNSPECIFIED ALTERTED MENTAL STATUS  SENT FROM [facility] FOR SEPSIS  Pneumonia of both lower lobes due to infectious organism  Altered mental status, unspecified altered mental status type  Chronic atrial fibrillation  Diastolic heart failure, stage C, chronic  Pulmonary HTN  Metabolic encephalopathy  Pleural effusion, bilateral  Essential hypertension  Altered mental status, unspecified altered mental status type  Pneumonia of both lower lobes due to infectious organism      Care discussed with family,         [  ]   yes  [  ]  No    imp:    stable[ ]    unstable[  ]     improving [   ]       unchanged  [  ]                Plans:  Continue present plans  [  ]               New consult [  ]   specialty  .......               order sam[  ]    test name.                  Discharge Planning  [  ]

## 2017-09-13 NOTE — PROGRESS NOTE ADULT - SUBJECTIVE AND OBJECTIVE BOX
INTERVAL HPI:  Pt is a 89 yo gentleman with a pmhx of CHF, CABG, who presents to the ED with decreased alertness coming from [facility]. He is at baseline responsive and interactive per nephew, but over the past day or so has been less responsive and just staring ahead and not eating. No reported fevers, patient is not speaking currently. Is DNR/DNI.  Admitted for pneumonia (07 Sep 2017 10:33)    OVERNIGHT EVENTS:  Awake and responsive. No distress.    Vital Signs Last 24 Hrs  T(C): 36.1 (13 Sep 2017 12:51), Max: 36.6 (12 Sep 2017 17:02)  T(F): 97 (13 Sep 2017 12:51), Max: 97.8 (12 Sep 2017 17:02)  HR: 82 (13 Sep 2017 12:51) (77 - 87)  BP: 119/62 (13 Sep 2017 12:51) (106/66 - 119/62)  BP(mean): --  RR: 16 (13 Sep 2017 12:51) (16 - 18)  SpO2: 96% (13 Sep 2017 12:51) (93% - 96%)    PHYSICAL EXAM:  GEN:        Awake, responsive and comfortable.  HEENT:    Normal.    RESP:      crackles.  CVS:          Regular rate and rhythm.   ABD:         Soft, non-tender, non-distended;     MEDICATIONS  (STANDING):  latanoprost 0.005% Ophthalmic Solution 1 Drop(s) Both EYES at bedtime  piperacillin/tazobactam IVPB. 3.375 Gram(s) IV Intermittent every 8 hours    LABS:                        11.3   13.3  )-----------( 167      ( 13 Sep 2017 06:26 )             34.3     09-13    139  |  99  |  21  ----------------------------<  84  2.9<LL>   |  29  |  1.02    Ca    8.4<L>      13 Sep 2017 06:26    PT/INR - ( 12 Sep 2017 12:35 )   PT: 12.4 sec;   INR: 1.13 ratio        ASSESSMENT AND PLAN:  ·	Altered mental status.  ·	Multifocal Pneumonia.  ·	Bilateral pleural effusion.  ·	Hypoglycemia.  ·	Anemia.  ·	A Fib.  ·	S/P AVR.  ·	Severe pHTN.  ·	HTN history.    On Zosyn. HCP did not consent for thoracentesis.  Nutritional support.

## 2017-09-13 NOTE — PROGRESS NOTE ADULT - SUBJECTIVE AND OBJECTIVE BOX
HPI:  · HPI  Pt is a 89 yo gentleman with a pmhx of CHF, CABG, who presents to the ED with decreased alertness coming from [facility]. He is at baseline responsive and interactive per nephew, but over the past day or so has been less responsive and just staring ahead and not eating. No reported fevers, patient is not speaking currently. Is DNR/DNI.  Admitted for pneumonia (07 Sep 2017 10:33)      Allergies    No Known Allergies    Intolerances        MEDICATIONS  (STANDING):  latanoprost 0.005% Ophthalmic Solution 1 Drop(s) Both EYES at bedtime  piperacillin/tazobactam IVPB. 3.375 Gram(s) IV Intermittent every 8 hours    MEDICATIONS  (PRN):      REVIEW OF SYSTEMS:    CONSTITUTIONAL: No fever, chills, weight loss, or fatigue  HEENT: No sore throat, runny nose, ear ache  RESPIRATORY: No cough, wheezing, No shortness of breath  CARDIOVASCULAR: No chest pain, palpitations, dizziness  GASTROINTESTINAL: No abdominal pain. No nausea, vomiting, diarrhea  GENITOURINARY: No dysuria, increase frequency, hematuria, or incontinence  NEUROLOGICAL: No headaches, memory loss, loss of strength, numbness, or tremors, no weakness  EXTREMITY: No pedal edema BLE  SKIN: No itching, burning, rashes, or lesions     VITAL SIGNS:  T(C): 36.4 (09-13-17 @ 17:07), Max: 36.4 (09-13-17 @ 17:07)  T(F): 97.5 (09-13-17 @ 17:07), Max: 97.5 (09-13-17 @ 17:07)  HR: 85 (09-13-17 @ 17:07) (77 - 85)  BP: 115/75 (09-13-17 @ 17:07) (106/66 - 119/62)  RR: 16 (09-13-17 @ 17:07) (16 - 18)  SpO2: 95% (09-13-17 @ 17:07) (94% - 96%)  Wt(kg): --    PHYSICAL EXAM:    GENERAL: not in any distress  HEENT: Neck is supple, normocephalic, atraumatic   CHEST/LUNG: Clear to percussion bilaterally; No rales, rhonchi, wheezing  HEART: Regular rate and rhythm; No murmurs, rubs, or gallops  ABDOMEN: Soft, Nontender, Nondistended; Bowel sounds present, no rebound   EXTREMITIES:  2+ Peripheral Pulses, No clubbing, cyanosis, or edema  GENITOURINARY:   SKIN: No rashes or lesions  BACK: no pressor sore   NERVOUS SYSTEM:  Alert & Oriented X3, Good concentration  PSYCH: normal affect     LABS:                         11.3   13.3  )-----------( 167      ( 13 Sep 2017 06:26 )             34.3     09-13    139  |  99  |  21  ----------------------------<  84  2.9<LL>   |  29  |  1.02    Ca    8.4<L>      13 Sep 2017 06:26        PT/INR - ( 12 Sep 2017 12:35 )   PT: 12.4 sec;   INR: 1.13 ratio                                               Radiology: HPI:  · HPI  Pt is a 89 yo gentleman with a pmhx of CHF, CABG, who presents to the ED with decreased alertness coming from [facility]. He is at baseline responsive and interactive per nephew, but over the past day or so has been less responsive and just staring ahead and not eating. No reported fevers, patient is not speaking currently. Is DNR/DNI.  Admitted for pneumonia (07 Sep 2017 10:33)  initially hospice and now they wanted iv antibiotics   patient has done well     Allergies    No Known Allergies    Intolerances        MEDICATIONS  (STANDING):  latanoprost 0.005% Ophthalmic Solution 1 Drop(s) Both EYES at bedtime  piperacillin/tazobactam IVPB. 3.375 Gram(s) IV Intermittent every 8 hours    MEDICATIONS  (PRN):      REVIEW OF SYSTEMS:  cough plus   no nausea vomiting diarrhea   very weak   no short of breath    No fever, chills,   has weight loss, and fatigue  HEENT: No sore throat, runny nose, ear ache  RESPIRATORY: rell  cough,   nowheezing, No shortness of breath  CARDIOVASCULAR: No chest pain, palpitations, dizziness  GASTROINTESTINAL: No abdominal pain. No nausea, vomiting, diarrhea  GENITOURINARY: No dysuria, increase frequency, hematuria, or incontinence  NEUROLOGICAL: No headaches, memory loss, loss of strength, numbness, or tremors, no weakness  EXTREMITY: No pedal edema BLE  SKIN: No itching, burning, rashes, or lesions     VITAL SIGNS:  T(C): 36.4 (09-13-17 @ 17:07), Max: 36.4 (09-13-17 @ 17:07)  T(F): 97.5 (09-13-17 @ 17:07), Max: 97.5 (09-13-17 @ 17:07)  HR: 85 (09-13-17 @ 17:07) (77 - 85)  BP: 115/75 (09-13-17 @ 17:07) (106/66 - 119/62)  RR: 16 (09-13-17 @ 17:07) (16 - 18)  SpO2: 95% (09-13-17 @ 17:07) (94% - 96%)  Wt(kg): --    PHYSICAL EXAM:    GENERAL: not in any distress  HEENT: Neck is supple, normocephalic, atraumatic   CHEST/LUNG: Clear bilaterally; No rales, rhonchi, wheezing  HEART: Regular rate and rhythm; No murmurs, rubs, or gallops  ABDOMEN: Soft, Nontender, Nondistended; Bowel sounds present, no rebound   EXTREMITIES:  2+ Peripheral Pulses, No clubbing, cyanosis, or edema  GENITOURINARY: NEGATIVE   SKIN: No rashes or lesions  BACK: no pressor sore   NERVOUS SYSTEM:  Alert & Oriented X1  depressed     LABS:                         11.3   13.3  )-----------( 167      ( 13 Sep 2017 06:26 )             34.3     09-13    139  |  99  |  21  ----------------------------<  84  2.9<LL>   |  29  |  1.02    Ca    8.4<L>      13 Sep 2017 06:26        PT/INR - ( 12 Sep 2017 12:35 )   PT: 12.4 sec;   INR: 1.13 ratio                                               Radiology:    < from: Xray Chest 1 View AP/PA. (09.12.17 @ 11:07) >  MPRESSION:    Bilateral lung opacities and pleural effusions without significant change.                  DEJON GOOD M.D., ATTENDING RADIOLOGIST  This document has been electronically signed. Sep 12 2017  1:23PM                < end of copied text >

## 2017-09-14 LAB
ANION GAP SERPL CALC-SCNC: 13 MMOL/L — SIGNIFICANT CHANGE UP (ref 5–17)
BUN SERPL-MCNC: 24 MG/DL — HIGH (ref 7–23)
CALCIUM SERPL-MCNC: 8.3 MG/DL — LOW (ref 8.5–10.1)
CHLORIDE SERPL-SCNC: 99 MMOL/L — SIGNIFICANT CHANGE UP (ref 96–108)
CO2 SERPL-SCNC: 25 MMOL/L — SIGNIFICANT CHANGE UP (ref 22–31)
CREAT SERPL-MCNC: 1.09 MG/DL — SIGNIFICANT CHANGE UP (ref 0.5–1.3)
GLUCOSE SERPL-MCNC: 82 MG/DL — SIGNIFICANT CHANGE UP (ref 70–99)
HCT VFR BLD CALC: 35.1 % — LOW (ref 39–50)
HGB BLD-MCNC: 11.2 G/DL — LOW (ref 13–17)
MAGNESIUM SERPL-MCNC: 2.2 MG/DL — SIGNIFICANT CHANGE UP (ref 1.6–2.6)
MCHC RBC-ENTMCNC: 28.8 PG — SIGNIFICANT CHANGE UP (ref 27–34)
MCHC RBC-ENTMCNC: 32 GM/DL — SIGNIFICANT CHANGE UP (ref 32–36)
MCV RBC AUTO: 90 FL — SIGNIFICANT CHANGE UP (ref 80–100)
PHOSPHATE SERPL-MCNC: 2.4 MG/DL — LOW (ref 2.5–4.5)
PLATELET # BLD AUTO: 142 K/UL — LOW (ref 150–400)
POTASSIUM SERPL-MCNC: 3.2 MMOL/L — LOW (ref 3.5–5.3)
POTASSIUM SERPL-SCNC: 3.2 MMOL/L — LOW (ref 3.5–5.3)
RBC # BLD: 3.9 M/UL — LOW (ref 4.2–5.8)
RBC # FLD: 16 % — HIGH (ref 11–15)
SODIUM SERPL-SCNC: 137 MMOL/L — SIGNIFICANT CHANGE UP (ref 135–145)
WBC # BLD: 13.2 K/UL — HIGH (ref 3.8–10.5)
WBC # FLD AUTO: 13.2 K/UL — HIGH (ref 3.8–10.5)

## 2017-09-14 RX ORDER — SODIUM,POTASSIUM PHOSPHATES 278-250MG
1 POWDER IN PACKET (EA) ORAL
Qty: 0 | Refills: 0 | Status: COMPLETED | OUTPATIENT
Start: 2017-09-14 | End: 2017-09-14

## 2017-09-14 RX ORDER — POTASSIUM CHLORIDE 20 MEQ
10 PACKET (EA) ORAL
Qty: 0 | Refills: 0 | Status: COMPLETED | OUTPATIENT
Start: 2017-09-14 | End: 2017-09-14

## 2017-09-14 RX ADMIN — PIPERACILLIN AND TAZOBACTAM 25 GRAM(S): 4; .5 INJECTION, POWDER, LYOPHILIZED, FOR SOLUTION INTRAVENOUS at 21:29

## 2017-09-14 RX ADMIN — Medication 20 MILLIGRAM(S): at 14:37

## 2017-09-14 RX ADMIN — Medication 100 MILLIEQUIVALENT(S): at 11:25

## 2017-09-14 RX ADMIN — LATANOPROST 1 DROP(S): 0.05 SOLUTION/ DROPS OPHTHALMIC; TOPICAL at 21:28

## 2017-09-14 RX ADMIN — Medication 1 TABLET(S): at 11:25

## 2017-09-14 RX ADMIN — Medication 1 TABLET(S): at 14:36

## 2017-09-14 RX ADMIN — PIPERACILLIN AND TAZOBACTAM 25 GRAM(S): 4; .5 INJECTION, POWDER, LYOPHILIZED, FOR SOLUTION INTRAVENOUS at 13:07

## 2017-09-14 RX ADMIN — Medication 20 MILLIGRAM(S): at 21:29

## 2017-09-14 RX ADMIN — Medication 100 MILLIEQUIVALENT(S): at 12:30

## 2017-09-14 RX ADMIN — Medication 100 MILLIEQUIVALENT(S): at 13:35

## 2017-09-14 RX ADMIN — PIPERACILLIN AND TAZOBACTAM 25 GRAM(S): 4; .5 INJECTION, POWDER, LYOPHILIZED, FOR SOLUTION INTRAVENOUS at 05:51

## 2017-09-14 NOTE — PROGRESS NOTE ADULT - SUBJECTIVE AND OBJECTIVE BOX
Patient is a 89y old  Male who presents with a chief complaint of AMS (11 Sep 2017 14:45)      INTERVAL HPI/OVERNIGHT EVENTS: C/O tasteless food    MEDICATIONS  (STANDING):  latanoprost 0.005% Ophthalmic Solution 1 Drop(s) Both EYES at bedtime  piperacillin/tazobactam IVPB. 3.375 Gram(s) IV Intermittent every 8 hours  potassium chloride  10 mEq/100 mL IVPB 10 milliEquivalent(s) IV Intermittent every 1 hour  potassium acid phosphate/sodium acid phosphate tablet (K-PHOS No. 2) 1 Tablet(s) Oral four times a day with meals  sildenafil (REVATIO) 20 milliGRAM(s) Oral every 8 hours    MEDICATIONS  (PRN):      Allergies    No Known Allergies    Intolerances        REVIEW OF SYSTEMS:  CONSTITUTIONAL: No fever, weight loss, or fatigue  EYES: No eye pain, visual disturbances, or discharge  ENMT:  No difficulty hearing, tinnitus, vertigo; No sinus or throat pain  NECK: No pain or stiffness  BREASTS: No pain, masses, or nipple discharge  RESPIRATORY: No cough, wheezing, chills or hemoptysis; No shortness of breath  CARDIOVASCULAR: No chest pain, palpitations, dizziness, or leg swelling  GASTROINTESTINAL: No abdominal or epigastric pain. No nausea, vomiting, or hematemesis; No diarrhea or constipation. No melena or hematochezia.  GENITOURINARY: No dysuria, frequency, hematuria, or incontinence  NEUROLOGICAL: No headaches, memory loss, loss of strength, numbness, or tremors  SKIN: No itching, burning, rashes, or lesions   LYMPH NODES: No enlarged glands  ENDOCRINE: No heat or cold intolerance; No hair loss  MUSCULOSKELETAL: No joint pain or swelling; No muscle, back, or extremity pain  PSYCHIATRIC: No depression, anxiety, mood swings, or difficulty sleeping  HEME/LYMPH: No easy bruising, or bleeding gums  ALLERGY AND IMMUNOLOGIC: No hives or eczema    Vital Signs Last 24 Hrs  T(C): 36.3 (14 Sep 2017 11:37), Max: 36.4 (13 Sep 2017 17:07)  T(F): 97.4 (14 Sep 2017 11:37), Max: 97.5 (13 Sep 2017 17:07)  HR: 82 (14 Sep 2017 11:37) (75 - 85)  BP: 118/74 (14 Sep 2017 11:37) (105/65 - 118/74)  BP(mean): --  RR: 18 (14 Sep 2017 11:37) (16 - 18)  SpO2: 96% (14 Sep 2017 11:37) (94% - 96%)    PHYSICAL EXAM:  GENERAL: NAD, well-groomed, well-developed  HEAD:  Atraumatic, Normocephalic  EYES: EOMI, PERRLA, conjunctiva and sclera clear  ENMT:  Moist mucous membranes, Good dentition, No lesions  NECK: Supple, No JVD, Normal thyroid  NERVOUS SYSTEM:  Alert & Oriented X3, Good concentration; Motor Strength /35 B/L upper and lower extremities; DTRs 2+ intact and symmetric  CHEST/LUNG: Clear to percussion bilaterally; No rales, rhonchi, wheezing, or rubs  HEART: Regular rate and rhythm; No murmurs, rubs, or gallops  ABDOMEN: Soft, Nontender, Nondistended; Bowel sounds present  EXTREMITIES:  2+ Peripheral Pulses, No clubbing, cyanosis, or edema  LYMPH: No lymphadenopathy noted  SKIN: No rashes or lesions    LABS:                        11.2   13.2  )-----------( 142      ( 14 Sep 2017 07:06 )             35.1     09-14    137  |  99  |  24<H>  ----------------------------<  82  3.2<L>   |  25  |  1.09    Ca    8.3<L>      14 Sep 2017 07:06  Phos  2.4     09-14  Mg     2.2     09-14          CAPILLARY BLOOD GLUCOSE              Procalcitonin, Serum: 0.16 ng/mL (09-10 @ 22:45)        Urine Culture:      Blood Culture:      RADIOLOGY & ADDITIONAL TESTS:    Imaging Personally Reviewed:  [ ] YES  [ ] NO    Consultant(s) Notes Reviewed:  [ ] YES  [ ] NO    Care Discussed with Consultants/Other Providers [ ] YES  [ ] NO    PROBLEMS:  ALTERTED MENTAL STATUS, UNSPECIFIED ALTERTED MENTAL STATUS  SENT FROM [facility] FOR [facility]  Pneumonia of both lower lobes due to infectious organism  Altered mental status, unspecified altered mental status type  Chronic atrial fibrillation  Diastolic heart failure, stage C, chronic  Pulmonary HTN  Metabolic encephalopathy  Pleural effusion, bilateral  Essential hypertension  Altered mental status, unspecified altered mental status type  Pneumonia of both lower lobes due to infectious organism      Care discussed with family,         [  ]   yes  [  ]  No    imp:    stable[ ]    unstable[  ]     improving [   ]       unchanged  [  ]                Plans:  Continue present plans  [  ]               New consult [  ]   specialty  .......               order sam[  ]    test name.                  Discharge Planning  [  ]

## 2017-09-14 NOTE — PROGRESS NOTE ADULT - SUBJECTIVE AND OBJECTIVE BOX
HPI:  · HPI  Pt is a 89 yo gentleman with a pmhx of CHF, CABG, who presents to the ED with decreased alertness coming from [facility]. He is at baseline responsive and interactive per nephew, but over the past day or so has been less responsive and just staring ahead and not eating. No reported fevers, patient is not speaking currently. Is DNR/DNI.  Admitted for pneumonia has had > 7 days of antibiotics   clinically well     Allergies    No Known Allergies    Intolerances        MEDICATIONS  (STANDING):  latanoprost 0.005% Ophthalmic Solution 1 Drop(s) Both EYES at bedtime  piperacillin/tazobactam IVPB. 3.375 Gram(s) IV Intermittent every 8 hours  sildenafil (REVATIO) 20 milliGRAM(s) Oral every 8 hours    MEDICATIONS  (PRN):      REVIEW OF SYSTEMS:    poor historian   feels well   no cough short of breath nausea vomiting diarrhea     VITAL SIGNS:  T(C): 36.3 (09-14-17 @ 11:37), Max: 36.4 (09-13-17 @ 17:07)  T(F): 97.4 (09-14-17 @ 11:37), Max: 97.5 (09-13-17 @ 17:07)  HR: 82 (09-14-17 @ 11:37) (75 - 85)  BP: 118/74 (09-14-17 @ 11:37) (105/65 - 118/74)  RR: 18 (09-14-17 @ 11:37) (16 - 18)  SpO2: 96% (09-14-17 @ 11:37) (94% - 96%)  Wt(kg): --    PHYSICAL EXAM:    GENERAL: not in any distress  HEENT: Neck is supple, normocephalic, atraumatic   CHEST/LUNG: Clear  bilaterally; No rales, rhonchi, wheezing  HEART: Regular rate and rhythm; No murmurs, rubs, or gallops  ABDOMEN: Soft, Nontender, Nondistended; Bowel sounds present, no rebound   EXTREMITIES:  2+ Peripheral Pulses, No clubbing, cyanosis, or edema  GENITOURINARY: NEGATIVE   SKIN: No rashes or lesions  BACK: no pressor sore   NERVOUS SYSTEM:  Alert & Oriented X1    LABS:                         11.2   13.2  )-----------( 142      ( 14 Sep 2017 07:06 )             35.1     09-14    137  |  99  |  24<H>  ----------------------------<  82  3.2<L>   |  25  |  1.09    Ca    8.3<L>      14 Sep 2017 07:06  Phos  2.4     09-14  Mg     2.2     09-14                                              Radiology:      < from: CT Chest No Cont (09.09.17 @ 18:33) >  Impression: Moderate bilateral pleural effusions and left upper lobe   pneumonia.                SHERRON ADAN M.D., ATTENDING RADIOLOGIST  This document has been electronically signed. Sep 10 2017 11:30AM          < end of copied text >

## 2017-09-14 NOTE — PROGRESS NOTE ADULT - ASSESSMENT
Pt is a 89 yo gentleman with a pmhx of CHF, CABG, who presents to the ED with decreased alertness coming from [facility]. He is at baseline responsive and interactive per nephew, but over the past day or so has been less responsive and just staring ahead and not eating. No reported fevers, patient is not speaking currently. Is DNR/DNI.  Admitted for pneumonia. As per HCP, Pt. does not want any antibiotics or further tests. Palliative care consult called and done. Noted. Pt, [facility]  spoke to the HCP today, now pt. wants IV antibiotics and all the tests necessary. Will strart on IV antibiotics, Will get ID and Pulmonary consults.Pulmonary consult noted. CT sacn chest noted, left upper lobe pneumonia. has bilateral pleural effusion. Will get IR consult for Thoracentesis .HCP refused consent for thoracentesis. Continue IV antibiotics. Discussed with pulmonary, may start on Revatio.

## 2017-09-14 NOTE — PROGRESS NOTE ADULT - ASSESSMENT
[facility] ? congestive heart failure  no fevers   procalcitonin is fairly low   antibiotics to be dcd by am day 5 tomorrow after resumption of antibiotics   got also 9/6-9/7   chest xray status quo   dc antibiotics   infectiuos disease wise clear for discharge

## 2017-09-14 NOTE — PROGRESS NOTE ADULT - SUBJECTIVE AND OBJECTIVE BOX
INTERVAL HPI:  Pt is a 89 yo gentleman with a pmhx of CHF, CABG, who presents to the ED with decreased alertness coming from [facility]. He is at baseline responsive and interactive per nephew, but over the past day or so has been less responsive and just staring ahead and not eating. No reported fevers, patient is not speaking currently. Is DNR/DNI.  Admitted for pneumonia (07 Sep 2017 10:33)    OVERNIGHT EVENTS:  Comfortable in bed without distress.    Vital Signs Last 24 Hrs  T(C): 36.3 (14 Sep 2017 11:37), Max: 36.4 (13 Sep 2017 17:07)  T(F): 97.4 (14 Sep 2017 11:37), Max: 97.5 (13 Sep 2017 17:07)  HR: 82 (14 Sep 2017 11:37) (75 - 85)  BP: 118/74 (14 Sep 2017 11:37) (105/65 - 119/62)  BP(mean): --  RR: 18 (14 Sep 2017 11:37) (16 - 18)  SpO2: 96% (14 Sep 2017 11:37) (94% - 96%)    PHYSICAL EXAM:  GEN:         Awake, responsive and comfortable.  HEENT:    Normal.    RESP:        crackles.  CVS:          Regular rate and rhythm.   ABD:         Soft, non-tender, non-distended;     MEDICATIONS  (STANDING):  latanoprost 0.005% Ophthalmic Solution 1 Drop(s) Both EYES at bedtime  piperacillin/tazobactam IVPB. 3.375 Gram(s) IV Intermittent every 8 hours  potassium chloride  10 mEq/100 mL IVPB 10 milliEquivalent(s) IV Intermittent every 1 hour  potassium acid phosphate/sodium acid phosphate tablet (K-PHOS No. 2) 1 Tablet(s) Oral four times a day with meals    LABS:                        11.2   13.2  )-----------( 142      ( 14 Sep 2017 07:06 )             35.1     09-14    137  |  99  |  24<H>  ----------------------------<  82  3.2<L>   |  25  |  1.09    Ca    8.3<L>      14 Sep 2017 07:06  Phos  2.4     09-14  Mg     2.2     09-14    < from: TTE Echo Doppler w/o Cont (17 @ 15:33) >   EXAM:  TTE WO CON COMPLETE W DOPPL         PROCEDURE DATE:  2017        INTERPRETATION:  REPORT:    TRANSTHORACIC ECHOCARDIOGRAM REPORT           Patient Name:   JAYLAN TAYLOR Patient Location: [facility] Rec #:  [id number]       Accession #:      [id number]  Account #:                       Height:           63.0 in 160.0 cm  YOB: 1922        Weight:           134.9 lb 61.20 kg  Patient Age:    89 years         BSA:              1.64 m²  Patient Gender: MBP:               106/61 mmHg        Date of Exam: 2017 1:28:26 PM  Sonographer:  KATRIN     Procedure:     2D Echo/Doppler/Color Doppler Complete.  Indications:   Dyspnea, unspecified - R06.00  Diagnosis:     Presence of other heart-valve replacement - Z95.4  Study Details: Technically adequate study.           2D AND M-MODE MEASUREMENTS (normal ranges within parentheses):  Left Ventricle:                 Normal    Aorta/Left Atrium:           Normal  IVSd (2D):              0.83 cm (0.7-1.1) Aortic Root (2D):  2.74 cm   (2.4-3.7)  LVPWd (2D):             0.89 cm (0.7-1.1) Left Atrium (2D):  4.20 cm   (1.9-4.0)  LVIDd (2D):             3.55 cm (3.4-5.7) Right Ventricle:  LVIDs (2D):             2.53 cm           TAPSE:           1.67 cm  LVFS (2D):             28.8 %  (>25%)  Relative Wall Thickness 0.50    (<0.42)     LV DIASTOLIC FUNCTION:  MV Peak E: 1.06 m/s Decel Time: 136 msec  MV Peak A: 0.59 m/s  E/A Ratio: 1.79     SPECTRAL DOPPLER ANALYSIS (where applicable):  Mitral Valve:  MV P1/2 Time: 39.49 msec  MV Area, PHT: 5.57 cm²     Aortic Valve: AoV Max Prabhakar: 2.14 m/s AoV Peak P.4 mmHg AoV Mean P.9 mmHg     LVOT Vmax: 1.02 m/s LVOT VTI: 0.183 m LVOT Diameter:     Tricuspid Valve and PA/RV Systolic Pressure: TR Max Velocity: 3.89 m/s   RA Pressure: 5 mmHg RVSP/PASP: 65.4 mmHg        PHYSICIAN INTERPRETATION:  Left Ventricle: Normal left ventricular size and wall thicknesses, with   normal systolic and diastolic function.  Left ventricular ejection fraction, by visual estimation, is 55 to 60%.  Right Ventricle: Normal right ventricular size and function.  Left Atrium: Mildly enlarged left atrium.  Right Atrium: The right atrium is normal in size.  Pericardium: There is no evidence of pericardial effusion.  Mitral Valve: Structurally normal mitral valve, with normal leaflet   excursion. Thickening and calcification of the anterior mitral valve   leaflet. Moderate mitral valve regurgitation is seen.  Tricuspid Valve: The tricuspid valve is normal in structure.   Moderate-severe tricuspid regurgitation is visualized. Estimated   pulmonary artery systolic pressure is 65.4 mmHg assuming a right atrial   pressure of 5 mmHg, which is consistent with severe pulmonary   hypertension.  Aortic Valve: Normal trileaflet aortic valve with normal opening. Trivial   aortic valve regurgitation is seen. Adequately functioning bioprothesis   in place.  Pulmonic Valve: Structurally normal pulmonic valve, with normal leaflet   excursion. Mild pulmonic valve regurgitation.  Aorta: The aortic root is normal in size and structure.  Pulmonary Artery: The main pulmonary artery is normal in size.        Summary:   1. Left ventricular ejection fraction, by visual estimation, is 55 to   60%.   2. Thickening and calcification of the anterior mitral valve leaflet.   3. Moderate-severe tricuspid regurgitation.   4. Estimated pulmonary artery systolic pressure is 65.4 mmHg assuming a   right atrial pressure of 5 mmHg, which is consistent with severe   pulmonary hypertension.   5. Mildly enlarged left atrium.     [id number] Jasmeet Sutton MD  Electronically signed on 2017 at 4:33:59 PM     JASMEET SUTTON M.D.; Attending Cardiologist  This document has been electronically signed. 2017  1:28PM        ASSESSMENT AND PLAN:  ·	Altered mental status better.  ·	Multifocal Pneumonia. Clinically non toxic.  ·	Bilateral pleural effusion.(reported to be transudate in [facility].)  ·	Hypoglycemia improved.  ·	Hypokalemia.  ·	Anemia.  ·	A Fib.  ·	S/P AVR.  ·	Diastolic CHF).  ·	Severe pHTN.  ·	HTN history.    Complete antibiotic per ID.  Replace potassium and follow electrolytes.  Pleural effusion is reported to be transudate recently in [facility]. No need for thoracentesis as pulmonary status is stable. Treat medically.  Will try sildenafil for pHTN.  Message left for Dr. Taylor(nephew and HCP). INTERVAL HPI:  Pt is a 89 yo gentleman with a pmhx of CHF, CABG, who presents to the ED with decreased alertness coming from [facility]. He is at baseline responsive and interactive per nephew, but over the past day or so has been less responsive and just staring ahead and not eating. No reported fevers, patient is not speaking currently. Is DNR/DNI.  Admitted for pneumonia (07 Sep 2017 10:33)    OVERNIGHT EVENTS:  Comfortable in bed without distress.    Vital Signs Last 24 Hrs  T(C): 36.3 (14 Sep 2017 11:37), Max: 36.4 (13 Sep 2017 17:07)  T(F): 97.4 (14 Sep 2017 11:37), Max: 97.5 (13 Sep 2017 17:07)  HR: 82 (14 Sep 2017 11:37) (75 - 85)  BP: 118/74 (14 Sep 2017 11:37) (105/65 - 119/62)  BP(mean): --  RR: 18 (14 Sep 2017 11:37) (16 - 18)  SpO2: 96% (14 Sep 2017 11:37) (94% - 96%)    PHYSICAL EXAM:  GEN:         Awake, responsive and comfortable.  HEENT:    Normal.    RESP:        crackles.  CVS:          Regular rate and rhythm.   ABD:         Soft, non-tender, non-distended;     MEDICATIONS  (STANDING):  latanoprost 0.005% Ophthalmic Solution 1 Drop(s) Both EYES at bedtime  piperacillin/tazobactam IVPB. 3.375 Gram(s) IV Intermittent every 8 hours  potassium chloride  10 mEq/100 mL IVPB 10 milliEquivalent(s) IV Intermittent every 1 hour  potassium acid phosphate/sodium acid phosphate tablet (K-PHOS No. 2) 1 Tablet(s) Oral four times a day with meals    LABS:                        11.2   13.2  )-----------( 142      ( 14 Sep 2017 07:06 )             35.1     09-14    137  |  99  |  24<H>  ----------------------------<  82  3.2<L>   |  25  |  1.09    Ca    8.3<L>      14 Sep 2017 07:06  Phos  2.4     09-14  Mg     2.2     09-14    < from: TTE Echo Doppler w/o Cont (17 @ 15:33) >   EXAM:  TTE WO CON COMPLETE W DOPPL         PROCEDURE DATE:  2017        INTERPRETATION:  REPORT:    TRANSTHORACIC ECHOCARDIOGRAM REPORT           Patient Name:   JAYLAN TAYLOR Patient Location: [facility] Rec #:  [id number]       Accession #:      [id number]  Account #:                       Height:           63.0 in 160.0 cm  YOB: 1922        Weight:           134.9 lb 61.20 kg  Patient Age:    89 years         BSA:              1.64 m²  Patient Gender: MBP:               106/61 mmHg        Date of Exam: 2017 1:28:26 PM  Sonographer:  KATRIN     Procedure:     2D Echo/Doppler/Color Doppler Complete.  Indications:   Dyspnea, unspecified - R06.00  Diagnosis:     Presence of other heart-valve replacement - Z95.4  Study Details: Technically adequate study.           2D AND M-MODE MEASUREMENTS (normal ranges within parentheses):  Left Ventricle:                 Normal    Aorta/Left Atrium:           Normal  IVSd (2D):              0.83 cm (0.7-1.1) Aortic Root (2D):  2.74 cm   (2.4-3.7)  LVPWd (2D):             0.89 cm (0.7-1.1) Left Atrium (2D):  4.20 cm   (1.9-4.0)  LVIDd (2D):             3.55 cm (3.4-5.7) Right Ventricle:  LVIDs (2D):             2.53 cm           TAPSE:           1.67 cm  LVFS (2D):             28.8 %  (>25%)  Relative Wall Thickness 0.50    (<0.42)     LV DIASTOLIC FUNCTION:  MV Peak E: 1.06 m/s Decel Time: 136 msec  MV Peak A: 0.59 m/s  E/A Ratio: 1.79     SPECTRAL DOPPLER ANALYSIS (where applicable):  Mitral Valve:  MV P1/2 Time: 39.49 msec  MV Area, PHT: 5.57 cm²     Aortic Valve: AoV Max Prabhakar: 2.14 m/s AoV Peak P.4 mmHg AoV Mean P.9 mmHg     LVOT Vmax: 1.02 m/s LVOT VTI: 0.183 m LVOT Diameter:     Tricuspid Valve and PA/RV Systolic Pressure: TR Max Velocity: 3.89 m/s   RA Pressure: 5 mmHg RVSP/PASP: 65.4 mmHg        PHYSICIAN INTERPRETATION:  Left Ventricle: Normal left ventricular size and wall thicknesses, with   normal systolic and diastolic function.  Left ventricular ejection fraction, by visual estimation, is 55 to 60%.  Right Ventricle: Normal right ventricular size and function.  Left Atrium: Mildly enlarged left atrium.  Right Atrium: The right atrium is normal in size.  Pericardium: There is no evidence of pericardial effusion.  Mitral Valve: Structurally normal mitral valve, with normal leaflet   excursion. Thickening and calcification of the anterior mitral valve   leaflet. Moderate mitral valve regurgitation is seen.  Tricuspid Valve: The tricuspid valve is normal in structure.   Moderate-severe tricuspid regurgitation is visualized. Estimated   pulmonary artery systolic pressure is 65.4 mmHg assuming a right atrial   pressure of 5 mmHg, which is consistent with severe pulmonary   hypertension.  Aortic Valve: Normal trileaflet aortic valve with normal opening. Trivial   aortic valve regurgitation is seen. Adequately functioning bioprothesis   in place.  Pulmonic Valve: Structurally normal pulmonic valve, with normal leaflet   excursion. Mild pulmonic valve regurgitation.  Aorta: The aortic root is normal in size and structure.  Pulmonary Artery: The main pulmonary artery is normal in size.        Summary:   1. Left ventricular ejection fraction, by visual estimation, is 55 to   60%.   2. Thickening and calcification of the anterior mitral valve leaflet.   3. Moderate-severe tricuspid regurgitation.   4. Estimated pulmonary artery systolic pressure is 65.4 mmHg assuming a   right atrial pressure of 5 mmHg, which is consistent with severe   pulmonary hypertension.   5. Mildly enlarged left atrium.     [id number] Jasmeet Sutton MD  Electronically signed on 2017 at 4:33:59 PM     JASMEET SUTTON M.D.; Attending Cardiologist  This document has been electronically signed. 2017  1:28PM        ASSESSMENT AND PLAN:  ·	Altered mental status better.  ·	Multifocal Pneumonia. Clinically non toxic.  ·	Bilateral pleural effusion.(reported to be transudate in [facility].)  ·	Hypoglycemia improved.  ·	Hypokalemia.  ·	Anemia.  ·	A Fib.  ·	S/P AVR.  ·	Diastolic CHF).  ·	Severe  TR.  ·	Severe pHTN.  ·	HTN history.    Complete antibiotic per ID.  Replace potassium and follow electrolytes.  Pleural effusion is reported to be transudate recently in [facility]. No need for thoracentesis as pulmonary status is stable. Treat medically.  Will try sildenafil for pHTN.  Message left for Dr. Taylor(nephew and HCP). INTERVAL HPI:  Pt is a 89 yo gentleman with a pmhx of CHF, CABG, who presents to the ED with decreased alertness coming from [facility]. He is at baseline responsive and interactive per nephew, but over the past day or so has been less responsive and just staring ahead and not eating. No reported fevers, patient is not speaking currently. Is DNR/DNI.  Admitted for pneumonia (07 Sep 2017 10:33)    OVERNIGHT EVENTS:  Comfortable in bed without distress.    Vital Signs Last 24 Hrs  T(C): 36.3 (14 Sep 2017 11:37), Max: 36.4 (13 Sep 2017 17:07)  T(F): 97.4 (14 Sep 2017 11:37), Max: 97.5 (13 Sep 2017 17:07)  HR: 82 (14 Sep 2017 11:37) (75 - 85)  BP: 118/74 (14 Sep 2017 11:37) (105/65 - 119/62)  BP(mean): --  RR: 18 (14 Sep 2017 11:37) (16 - 18)  SpO2: 96% (14 Sep 2017 11:37) (94% - 96%)    PHYSICAL EXAM:  GEN:         Awake, responsive and comfortable.  HEENT:    Normal.    RESP:        crackles.  CVS:          Regular rate and rhythm.   ABD:         Soft, non-tender, non-distended;     MEDICATIONS  (STANDING):  latanoprost 0.005% Ophthalmic Solution 1 Drop(s) Both EYES at bedtime  piperacillin/tazobactam IVPB. 3.375 Gram(s) IV Intermittent every 8 hours  potassium chloride  10 mEq/100 mL IVPB 10 milliEquivalent(s) IV Intermittent every 1 hour  potassium acid phosphate/sodium acid phosphate tablet (K-PHOS No. 2) 1 Tablet(s) Oral four times a day with meals    LABS:                        11.2   13.2  )-----------( 142      ( 14 Sep 2017 07:06 )             35.1     09-14    137  |  99  |  24<H>  ----------------------------<  82  3.2<L>   |  25  |  1.09    Ca    8.3<L>      14 Sep 2017 07:06  Phos  2.4     09-14  Mg     2.2     09-14    < from: TTE Echo Doppler w/o Cont (17 @ 15:33) >   EXAM:  TTE WO CON COMPLETE W DOPPL         PROCEDURE DATE:  2017        INTERPRETATION:  REPORT:    TRANSTHORACIC ECHOCARDIOGRAM REPORT           Patient Name:   JAYLAN TAYLOR Patient Location: [facility] Rec #:  [id number]       Accession #:      [id number]  Account #:                       Height:           63.0 in 160.0 cm  YOB: 1922        Weight:           134.9 lb 61.20 kg  Patient Age:    89 years         BSA:              1.64 m²  Patient Gender: MBP:               106/61 mmHg        Date of Exam: 2017 1:28:26 PM  Sonographer:  KATRIN     Procedure:     2D Echo/Doppler/Color Doppler Complete.  Indications:   Dyspnea, unspecified - R06.00  Diagnosis:     Presence of other heart-valve replacement - Z95.4  Study Details: Technically adequate study.           2D AND M-MODE MEASUREMENTS (normal ranges within parentheses):  Left Ventricle:                 Normal    Aorta/Left Atrium:           Normal  IVSd (2D):              0.83 cm (0.7-1.1) Aortic Root (2D):  2.74 cm   (2.4-3.7)  LVPWd (2D):             0.89 cm (0.7-1.1) Left Atrium (2D):  4.20 cm   (1.9-4.0)  LVIDd (2D):             3.55 cm (3.4-5.7) Right Ventricle:  LVIDs (2D):             2.53 cm           TAPSE:           1.67 cm  LVFS (2D):             28.8 %  (>25%)  Relative Wall Thickness 0.50    (<0.42)     LV DIASTOLIC FUNCTION:  MV Peak E: 1.06 m/s Decel Time: 136 msec  MV Peak A: 0.59 m/s  E/A Ratio: 1.79     SPECTRAL DOPPLER ANALYSIS (where applicable):  Mitral Valve:  MV P1/2 Time: 39.49 msec  MV Area, PHT: 5.57 cm²     Aortic Valve: AoV Max Prabhakar: 2.14 m/s AoV Peak P.4 mmHg AoV Mean P.9 mmHg     LVOT Vmax: 1.02 m/s LVOT VTI: 0.183 m LVOT Diameter:     Tricuspid Valve and PA/RV Systolic Pressure: TR Max Velocity: 3.89 m/s   RA Pressure: 5 mmHg RVSP/PASP: 65.4 mmHg        PHYSICIAN INTERPRETATION:  Left Ventricle: Normal left ventricular size and wall thicknesses, with   normal systolic and diastolic function.  Left ventricular ejection fraction, by visual estimation, is 55 to 60%.  Right Ventricle: Normal right ventricular size and function.  Left Atrium: Mildly enlarged left atrium.  Right Atrium: The right atrium is normal in size.  Pericardium: There is no evidence of pericardial effusion.  Mitral Valve: Structurally normal mitral valve, with normal leaflet   excursion. Thickening and calcification of the anterior mitral valve   leaflet. Moderate mitral valve regurgitation is seen.  Tricuspid Valve: The tricuspid valve is normal in structure.   Moderate-severe tricuspid regurgitation is visualized. Estimated   pulmonary artery systolic pressure is 65.4 mmHg assuming a right atrial   pressure of 5 mmHg, which is consistent with severe pulmonary   hypertension.  Aortic Valve: Normal trileaflet aortic valve with normal opening. Trivial   aortic valve regurgitation is seen. Adequately functioning bioprothesis   in place.  Pulmonic Valve: Structurally normal pulmonic valve, with normal leaflet   excursion. Mild pulmonic valve regurgitation.  Aorta: The aortic root is normal in size and structure.  Pulmonary Artery: The main pulmonary artery is normal in size.        Summary:   1. Left ventricular ejection fraction, by visual estimation, is 55 to   60%.   2. Thickening and calcification of the anterior mitral valve leaflet.   3. Moderate-severe tricuspid regurgitation.   4. Estimated pulmonary artery systolic pressure is 65.4 mmHg assuming a   right atrial pressure of 5 mmHg, which is consistent with severe   pulmonary hypertension.   5. Mildly enlarged left atrium.     [id number] Jasmeet Sutton MD  Electronically signed on 2017 at 4:33:59 PM     JASMEET SUTTON M.D.; Attending Cardiologist  This document has been electronically signed. 2017  1:28PM        ASSESSMENT AND PLAN:  ·	Altered mental status better.  ·	Multifocal Pneumonia. Clinically non toxic.  ·	Bilateral pleural effusion.(reported to be transudate in [facility]  ·	Hypoglycemia improved.  ·	Hypokalemia.  ·	Anemia.  ·	A Fib.  ·	S/P AVR.  ·	Diastolic CHF).  ·	Severe  TR.  ·	Severe pHTN.  ·	HTN history.    Complete antibiotic per ID.  Replace potassium and follow electrolytes.  Pleural effusion is reported to be transudate recently in [facility]. No need for thoracentesis as pulmonary status is stable. Treat medically.  Will try sildenafil for pHTN.  Message left for Dr. Taylor(nephew and HCP).    Addendum: Discussed with HCP(). Pulmonary status is stable at this point and can be discharged, SPO2 on room air before discharge for O2 need.

## 2017-09-15 RX ORDER — POTASSIUM CHLORIDE 20 MEQ
10 PACKET (EA) ORAL
Qty: 0 | Refills: 0 | Status: COMPLETED | OUTPATIENT
Start: 2017-09-15 | End: 2017-09-15

## 2017-09-15 RX ADMIN — Medication 100 MILLIEQUIVALENT(S): at 11:39

## 2017-09-15 RX ADMIN — PIPERACILLIN AND TAZOBACTAM 25 GRAM(S): 4; .5 INJECTION, POWDER, LYOPHILIZED, FOR SOLUTION INTRAVENOUS at 05:52

## 2017-09-15 RX ADMIN — Medication 20 MILLIGRAM(S): at 13:55

## 2017-09-15 RX ADMIN — Medication 20 MILLIGRAM(S): at 05:52

## 2017-09-15 RX ADMIN — PIPERACILLIN AND TAZOBACTAM 25 GRAM(S): 4; .5 INJECTION, POWDER, LYOPHILIZED, FOR SOLUTION INTRAVENOUS at 22:10

## 2017-09-15 RX ADMIN — PIPERACILLIN AND TAZOBACTAM 25 GRAM(S): 4; .5 INJECTION, POWDER, LYOPHILIZED, FOR SOLUTION INTRAVENOUS at 13:57

## 2017-09-15 RX ADMIN — LATANOPROST 1 DROP(S): 0.05 SOLUTION/ DROPS OPHTHALMIC; TOPICAL at 22:09

## 2017-09-15 RX ADMIN — Medication 20 MILLIGRAM(S): at 22:09

## 2017-09-15 NOTE — PROGRESS NOTE ADULT - SUBJECTIVE AND OBJECTIVE BOX
Patient is a 89y old  Male who presents with a chief complaint of AMS (11 Sep 2017 14:45)      INTERVAL HPI/OVERNIGHT EVENTS:    MEDICATIONS  (STANDING):  latanoprost 0.005% Ophthalmic Solution 1 Drop(s) Both EYES at bedtime  piperacillin/tazobactam IVPB. 3.375 Gram(s) IV Intermittent every 8 hours  sildenafil (REVATIO) 20 milliGRAM(s) Oral every 8 hours    MEDICATIONS  (PRN):      Allergies    No Known Allergies    Intolerances        REVIEW OF SYSTEMS:  CONSTITUTIONAL: No fever, weight loss, or fatigue  EYES: No eye pain, visual disturbances, or discharge  ENMT:  No difficulty hearing, tinnitus, vertigo; No sinus or throat pain  NECK: No pain or stiffness  BREASTS: No pain, masses, or nipple discharge  RESPIRATORY: No cough, wheezing, chills or hemoptysis; No shortness of breath  CARDIOVASCULAR: No chest pain, palpitations, dizziness, or leg swelling  GASTROINTESTINAL: No abdominal or epigastric pain. No nausea, vomiting, or hematemesis; No diarrhea or constipation. No melena or hematochezia.  GENITOURINARY: No dysuria, frequency, hematuria, or incontinence  NEUROLOGICAL: No headaches, memory loss, loss of strength, numbness, or tremors  SKIN: No itching, burning, rashes, or lesions   LYMPH NODES: No enlarged glands  ENDOCRINE: No heat or cold intolerance; No hair loss  MUSCULOSKELETAL: No joint pain or swelling; No muscle, back, or extremity pain  PSYCHIATRIC: No depression, anxiety, mood swings, or difficulty sleeping  HEME/LYMPH: No easy bruising, or bleeding gums  ALLERGY AND IMMUNOLOGIC: No hives or eczema    Vital Signs Last 24 Hrs  T(C): 36.1 (15 Sep 2017 00:09), Max: 36.3 (14 Sep 2017 11:37)  T(F): 96.9 (15 Sep 2017 00:09), Max: 97.4 (14 Sep 2017 11:37)  HR: 68 (15 Sep 2017 05:00) (68 - 85)  BP: 106/71 (15 Sep 2017 05:00) (106/71 - 131/68)  BP(mean): --  RR: 18 (15 Sep 2017 05:00) (17 - 18)  SpO2: 96% (15 Sep 2017 05:00) (96% - 100%)    PHYSICAL EXAM:  GENERAL: NAD, well-groomed, well-developed  HEAD:  Atraumatic, Normocephalic  EYES: EOMI, EDGAR, conjunctiva and sclera clear  ENMT:  Moist mucous membranes, Good dentition, No lesions  NECK: Supple, No JVD, Normal thyroid  NERVOUS SYSTEM:  Alert & Oriented X3, Good concentration; Motor Strength 5/5 B/L upper and lower extremities; DTRs 2+ intact and symmetric  CHEST/LUNG: Clear to percussion bilaterally; No rales, rhonchi, wheezing, or rubs  HEART: Regular rate and rhythm; No murmurs, rubs, or gallops  ABDOMEN: Soft, Nontender, Nondistended; Bowel sounds present  EXTREMITIES:  2+ Peripheral Pulses, No clubbing, cyanosis, or edema  LYMPH: No lymphadenopathy noted  SKIN: No rashes or lesions    LABS:                        11.2   13.2  )-----------( 142      ( 14 Sep 2017 07:06 )             35.1     09-14    137  |  99  |  24<H>  ----------------------------<  82  3.2<L>   |  25  |  1.09    Ca    8.3<L>      14 Sep 2017 07:06  Phos  2.4     09-14  Mg     2.2     09-14          CAPILLARY BLOOD GLUCOSE              Procalcitonin, Serum: 0.16 ng/mL (09-10 @ 22:45)        Urine Culture:      Blood Culture:      RADIOLOGY & ADDITIONAL TESTS:    Imaging Personally Reviewed:  [ ] YES  [ ] NO    Consultant(s) Notes Reviewed:  [ ] YES  [ ] NO    Care Discussed with Consultants/Other Providers [ ] YES  [ ] NO    PROBLEMS:  ALTERTED MENTAL STATUS, UNSPECIFIED ALTERTED MENTAL STATUS  SENT FROM [facility] FOR SEPSIS  Pneumonia of both lower lobes due to infectious organism  Altered mental status, unspecified altered mental status type  Chronic atrial fibrillation  Diastolic heart failure, stage C, chronic  Pulmonary HTN  Metabolic encephalopathy  Pleural effusion, bilateral  Essential hypertension  Altered mental status, unspecified altered mental status type  Pneumonia of both lower lobes due to infectious organism      Care discussed with family,         [  ]   yes  [  ]  No    imp:    stable[ ]    unstable[  ]     improving [   ]       unchanged  [  ]                Plans:  Continue present plans  [  ]               New consult [  ]   specialty  .......               order sam[  ]    test name.                  Discharge Planning  [  ]

## 2017-09-15 NOTE — PROGRESS NOTE ADULT - SUBJECTIVE AND OBJECTIVE BOX
INTERVAL HPI:   Pt is a 89 yo gentleman with a pmhx of CHF, CABG, who presents to the ED with decreased alertness coming from [facility]. He is at baseline responsive and interactive per nephew, but over the past day or so has been less responsive and just staring ahead and not eating. No reported fevers, patient is not speaking currently. Is DNR/DNI.  Admitted for pneumonia (07 Sep 2017 10:33)    OVERNIGHT EVENTS:  Awake and comfortable.    Vital Signs Last 24 Hrs  T(C): 36.1 (15 Sep 2017 00:09), Max: 36.3 (14 Sep 2017 11:37)  T(F): 96.9 (15 Sep 2017 00:09), Max: 97.4 (14 Sep 2017 11:37)  HR: 68 (15 Sep 2017 05:00) (68 - 85)  BP: 106/71 (15 Sep 2017 05:00) (106/71 - 131/68)  BP(mean): --  RR: 18 (15 Sep 2017 05:00) (17 - 18)  SpO2: 96% (15 Sep 2017 05:00) (96% - 100%)    PHYSICAL EXAM:  GEN:         Awake, responsive and comfortable.  HEENT:    Normal.    RESP:       no wheezing  CVS:          Regular rate and rhythm.   ABD:         Soft, non-tender, non-distended;     MEDICATIONS  (STANDING):  latanoprost 0.005% Ophthalmic Solution 1 Drop(s) Both EYES at bedtime  piperacillin/tazobactam IVPB. 3.375 Gram(s) IV Intermittent every 8 hours  sildenafil (REVATIO) 20 milliGRAM(s) Oral every 8 hours  potassium chloride  10 mEq/100 mL IVPB 10 milliEquivalent(s) IV Intermittent every 1 hour    LABS:                        11.2   13.2  )-----------( 142      ( 14 Sep 2017 07:06 )             35.1     09-14    137  |  99  |  24<H>  ----------------------------<  82  3.2<L>   |  25  |  1.09    Ca    8.3<L>      14 Sep 2017 07:06  Phos  2.4     09-14  Mg     2.2     09-14      ASSESSMENT AND PLAN:  ·	Altered mental status better.  ·	Multifocal Pneumonia. Clinically non toxic.  ·	Bilateral pleural effusion.(reported to be transudate in [facility].)  ·	Hypoglycemia improved.  ·	Hypokalemia.  ·	Anemia.  ·	A Fib.  ·	S/P AVR.  ·	Diastolic CHF).  ·	Severe  TR.  ·	Severe pHTN.  ·	HTN history.    Pulmonary stable for discharge,

## 2017-09-15 NOTE — PROGRESS NOTE ADULT - ASSESSMENT
Pt is a 89 yo gentleman with a pmhx of CHF, CABG, who presents to the ED with decreased alertness coming from [facility]. He is at baseline responsive and interactive per nephew, but over the past day or so has been less responsive and just staring ahead and not eating. No reported fevers, patient is not speaking currently. Is DNR/DNI.  Admitted for pneumonia. As per HCP, Pt. does not want any antibiotics or further tests. Palliative care consult called and done. Noted. Pt, referred to [facility].  spoke to the HCP today, now pt. wants IV antibiotics and all the tests necessary. Will strart on IV antibiotics, Will get ID and Pulmonary consults.Pulmonary consult noted. CT sacn chest noted, left upper lobe pneumonia. has bilateral pleural effusion. Will get IR consult for Thoracentesis .HCP refused consent for thoracentesis. Continue IV antibiotics. Discussed with pulmonary, Started on revatio. Pt. stable, Cleared for discharge by ID and Pulmonary. For placement.

## 2017-09-16 RX ADMIN — PIPERACILLIN AND TAZOBACTAM 25 GRAM(S): 4; .5 INJECTION, POWDER, LYOPHILIZED, FOR SOLUTION INTRAVENOUS at 05:53

## 2017-09-16 RX ADMIN — Medication 20 MILLIGRAM(S): at 05:53

## 2017-09-16 RX ADMIN — Medication 20 MILLIGRAM(S): at 21:37

## 2017-09-16 RX ADMIN — Medication 20 MILLIGRAM(S): at 13:23

## 2017-09-16 RX ADMIN — PIPERACILLIN AND TAZOBACTAM 25 GRAM(S): 4; .5 INJECTION, POWDER, LYOPHILIZED, FOR SOLUTION INTRAVENOUS at 13:21

## 2017-09-16 RX ADMIN — LATANOPROST 1 DROP(S): 0.05 SOLUTION/ DROPS OPHTHALMIC; TOPICAL at 21:37

## 2017-09-16 RX ADMIN — PIPERACILLIN AND TAZOBACTAM 25 GRAM(S): 4; .5 INJECTION, POWDER, LYOPHILIZED, FOR SOLUTION INTRAVENOUS at 21:37

## 2017-09-16 NOTE — PROGRESS NOTE ADULT - SUBJECTIVE AND OBJECTIVE BOX
HPI:  · HPI  Pt is a 89 yo gentleman with a pmhx of CHF, CABG, who presents to the ED with decreased alertness coming from [facility]. He is at baseline responsive and interactive per nephew, but over the past day or so has been less responsive and just staring ahead and not eating. No reported fevers, patient is not speaking currently. Is DNR/DNI.  Admitted for pneumonia (07 Sep 2017 10:33)      Allergies    No Known Allergies    Intolerances        MEDICATIONS  (STANDING):  latanoprost 0.005% Ophthalmic Solution 1 Drop(s) Both EYES at bedtime  sildenafil (REVATIO) 20 milliGRAM(s) Oral every 8 hours    MEDICATIONS  (PRN):      REVIEW OF SYSTEMS:    CONSTITUTIONAL: No fever, chills, weight loss, or fatigue  HEENT: No sore throat, runny nose, ear ache  RESPIRATORY: No cough, wheezing, No shortness of breath  CARDIOVASCULAR: No chest pain, palpitations, dizziness  GASTROINTESTINAL: No abdominal pain. No nausea, vomiting, diarrhea  GENITOURINARY: No dysuria, increase frequency, hematuria, or incontinence  NEUROLOGICAL: No headaches, memory loss, loss of strength, numbness, or tremors, no weakness  EXTREMITY: No pedal edema BLE  SKIN: No itching, burning, rashes, or lesions     VITAL SIGNS:  T(C): 36 (09-16-17 @ 17:10), Max: 36.6 (09-16-17 @ 11:57)  T(F): 96.8 (09-16-17 @ 17:10), Max: 97.9 (09-16-17 @ 11:57)  HR: 70 (09-16-17 @ 17:10) (70 - 85)  BP: 101/53 (09-16-17 @ 17:10) (98/65 - 113/54)  RR: 18 (09-16-17 @ 17:10) (16 - 18)  SpO2: 97% (09-16-17 @ 17:10) (95% - 98%)  Wt(kg): --    PHYSICAL EXAM:    GENERAL: not in any distress  HEENT: Neck is supple, normocephalic, atraumatic   CHEST/LUNG: Clear to percussion bilaterally; No rales, rhonchi, wheezing  HEART: Regular rate and rhythm; No murmurs, rubs, or gallops  ABDOMEN: Soft, Nontender, Nondistended; Bowel sounds present, no rebound   EXTREMITIES:  2+ Peripheral Pulses, No clubbing, cyanosis, or edema  GENITOURINARY:   SKIN: No rashes or lesions  BACK: no pressor sore   NERVOUS SYSTEM:  Alert & Oriented X3, Good concentration  PSYCH: normal affect     LABS:                                                   Radiology: HPI:  · HPI  Pt is a 89 yo gentleman with a pmhx of CHF, CABG, who presents to the ED with decreased alertness coming from [facility]. He is at baseline responsive and interactive per nephew, but over the past day or so has been less responsive and just staring ahead and not eating. No reported fevers, patient is not speaking currently. Is DNR/DNI.  Admitted for pneumonia (07 Sep 2017 10:33)      Allergies    No Known Allergies    Intolerances        MEDICATIONS  (STANDING):  latanoprost 0.005% Ophthalmic Solution 1 Drop(s) Both EYES at bedtime  sildenafil (REVATIO) 20 milliGRAM(s) Oral every 8 hours    MEDICATIONS  (PRN):      REVIEW OF SYSTEMS:    feels better     VITAL SIGNS:  T(C): 36 (09-16-17 @ 17:10), Max: 36.6 (09-16-17 @ 11:57)  T(F): 96.8 (09-16-17 @ 17:10), Max: 97.9 (09-16-17 @ 11:57)  HR: 70 (09-16-17 @ 17:10) (70 - 85)  BP: 101/53 (09-16-17 @ 17:10) (98/65 - 113/54)  RR: 18 (09-16-17 @ 17:10) (16 - 18)  SpO2: 97% (09-16-17 @ 17:10) (95% - 98%)  Wt(kg): --    PHYSICAL EXAM:    GENERAL: not in any distress  HEENT: Neck is supple, normocephalic, atraumatic   CHEST/LUNG: Clear bilaterally; No rales, rhonchi, wheezing  HEART: Regular rate and rhythm; No murmurs, rubs, or gallops  ABDOMEN: Soft, Nontender, Nondistended; Bowel sounds present, no rebound   EXTREMITIES:  2+ Peripheral Pulses, No clubbing, cyanosis, or edema  GENITOURINARY: NEGATIVE   SKIN: No rashes or lesions  BACK: no pressor sore   NERVOUS SYSTEM:  Alert & Oriented X2    LABS:                                                   Radiology:

## 2017-09-16 NOTE — PROGRESS NOTE ADULT - ASSESSMENT
Pt is a 89 yo gentleman with a pmhx of CHF, CABG, who presents to the ED with decreased alertness coming from [facility]. He is at baseline responsive and interactive per nephew, but over the past day or so has been less responsive and just staring ahead and not eating. No reported fevers, patient is not speaking currently. Is DNR/DNI.  Admitted for pneumonia. As per HCP, Pt. does not want any antibiotics or further tests. Palliative care consult called and done. Noted. Pt, referred to [facility]osp[facility] care network.  spoke to the HCP today, now pt. wants IV antibiotics and all the tests necessary. Will strart on IV antibiotics, Will get ID and Pulmonary consults.Pulmonary consult noted. CT sacn chest noted, left upper lobe pneumonia. has bilateral pleural effusion. Will get IR consult for Thoracentesis .HCP refused consent for thoracentesis. Continue IV antibiotics. Discussed with pulmonary, Started on revatio. Pt. stable, Cleared for discharge by ID and Pulmonary. Awaiting auth. from Insurance.

## 2017-09-16 NOTE — PROGRESS NOTE ADULT - SUBJECTIVE AND OBJECTIVE BOX
PATIENT DENIES DYSPNEA  ALL LABS/RADIOLOGY/MEDICATIONS REVIEWED;   NO CHANGE IN EXAM: FRAIL, NON LABORED BREATHING SAT: 97% COARSE BREATH SOUNDS SOFT HEART SOUNDS NO EDEMA    IMPRESSION:    PNEUMONIA WITH ATELECTASIS AND BILATERAL EFFUSIONS , PREVIOUS THORACENTESIS : TRANSUDATIVE; SUSPECT PARAPNEUMONIC  AND POOR RESPIRATORY EFFORTS MOST PREDOMINANT; LOWISH BNP SPEAKS AGAINT OVERT HEART FAILURE COMPONENT; RECENT ECHO: PRESERVED LVEF; SP AVR; ATRIAL FIBRILLATION WITH CONTROLED VENTRICULAR RATES; POOR anticoagulation RISK    AGREE WITH PRESENT MANAGEMENT  PRN DIURESIS  SPOKE AT LENGTH WITH NEPHCHARLES , DR JATINDER TAYLOR\    WILBUR BAUTISTA MD, FACC

## 2017-09-16 NOTE — PROGRESS NOTE ADULT - SUBJECTIVE AND OBJECTIVE BOX
INTERVAL HPI:  Pt is a 89 yo gentleman with a pmhx of CHF, CABG, who presents to the ED with decreased alertness coming from [facility]. He is at baseline responsive and interactive per nephew, but over the past day or so has been less responsive and just staring ahead and not eating. No reported fevers, patient is not speaking currently. Is DNR/DNI.  Admitted for pneumonia (07 Sep 2017 10:33)    OVERNIGHT EVENTS:  Comfortable in bed.    Vital Signs Last 24 Hrs  T(C): 36 (16 Sep 2017 17:10), Max: 36.6 (16 Sep 2017 11:57)  T(F): 96.8 (16 Sep 2017 17:10), Max: 97.9 (16 Sep 2017 11:57)  HR: 70 (16 Sep 2017 17:10) (70 - 85)  BP: 101/53 (16 Sep 2017 17:10) (98/65 - 113/54)  BP(mean): --  RR: 18 (16 Sep 2017 17:10) (16 - 18)  SpO2: 97% (16 Sep 2017 17:10) (95% - 98%)    PHYSICAL EXAM:  GEN:        Awake, responsive and comfortable.  HEENT:    Normal.    RESP:       no wheezing.  CVS:          Regular rate and rhythm.   ABD:         Soft, non-tender, non-distended;     MEDICATIONS  (STANDING):  latanoprost 0.005% Ophthalmic Solution 1 Drop(s) Both EYES at bedtime  piperacillin/tazobactam IVPB. 3.375 Gram(s) IV Intermittent every 8 hours  sildenafil (REVATIO) 20 milliGRAM(s) Oral every 8 hours      ASSESSMENT AND PLAN:  ·	Altered mental status better.  ·	Multifocal Pneumonia. Clinically non toxic.  ·	Bilateral pleural effusion.(reported to be transudate in [facility].)  ·	Hypoglycemia improved.  ·	Hypokalemia.  ·	Anemia.  ·	A Fib.  ·	S/P AVR.  ·	Diastolic CHF).  ·	Severe  TR.  ·	Severe pHTN.  ·	HTN history.      Continue supportive treatment.

## 2017-09-16 NOTE — PROGRESS NOTE ADULT - SUBJECTIVE AND OBJECTIVE BOX
Patient is a 89y old  Male who presents with a chief complaint of AMS (11 Sep 2017 14:45)      INTERVAL HPI/OVERNIGHT EVENTS:    MEDICATIONS  (STANDING):  latanoprost 0.005% Ophthalmic Solution 1 Drop(s) Both EYES at bedtime  piperacillin/tazobactam IVPB. 3.375 Gram(s) IV Intermittent every 8 hours  sildenafil (REVATIO) 20 milliGRAM(s) Oral every 8 hours    MEDICATIONS  (PRN):      Allergies    No Known Allergies    Intolerances        REVIEW OF SYSTEMS:  CONSTITUTIONAL: No fever, weight loss, or fatigue  EYES: No eye pain, visual disturbances, or discharge  ENMT:  No difficulty hearing, tinnitus, vertigo; No sinus or throat pain  NECK: No pain or stiffness  BREASTS: No pain, masses, or nipple discharge  RESPIRATORY: No cough, wheezing, chills or hemoptysis; No shortness of breath  CARDIOVASCULAR: No chest pain, palpitations, dizziness, or leg swelling  GASTROINTESTINAL: No abdominal or epigastric pain. No nausea, vomiting, or hematemesis; No diarrhea or constipation. No melena or hematochezia.  GENITOURINARY: No dysuria, frequency, hematuria, or incontinence  NEUROLOGICAL: No headaches, memory loss, loss of strength, numbness, or tremors  SKIN: No itching, burning, rashes, or lesions   LYMPH NODES: No enlarged glands  ENDOCRINE: No heat or cold intolerance; No hair loss  MUSCULOSKELETAL: No joint pain or swelling; No muscle, back, or extremity pain  PSYCHIATRIC: No depression, anxiety, mood swings, or difficulty sleeping  HEME/LYMPH: No easy bruising, or bleeding gums  ALLERGY AND IMMUNOLOGIC: No hives or eczema    Vital Signs Last 24 Hrs  T(C): 35.9 (16 Sep 2017 05:08), Max: 37.1 (15 Sep 2017 11:29)  T(F): 96.6 (16 Sep 2017 05:08), Max: 98.8 (15 Sep 2017 11:29)  HR: 83 (16 Sep 2017 06:15) (77 - 85)  BP: 98/65 (16 Sep 2017 05:08) (98/65 - 113/65)  BP(mean): --  RR: 16 (16 Sep 2017 05:08) (16 - 18)  SpO2: 97% (16 Sep 2017 06:15) (94% - 98%)    PHYSICAL EXAM:  GENERAL: NAD, well-groomed, well-developed  HEAD:  Atraumatic, Normocephalic  EYES: EOMI, EDGAR, conjunctiva and sclera clear  ENMT:  Moist mucous membranes, Good dentition, No lesions  NECK: Supple, No JVD, Normal thyroid  NERVOUS SYSTEM:  Alert & Oriented X3, Good concentration; Motor Strength 5/5 B/L upper and lower extremities; DTRs 2+ intact and symmetric  CHEST/LUNG: Clear to percussion bilaterally; No rales, rhonchi, wheezing, or rubs  HEART: Regular rate and rhythm; No murmurs, rubs, or gallops  ABDOMEN: Soft, Nontender, Nondistended; Bowel sounds present  EXTREMITIES:  2+ Peripheral Pulses, No clubbing, cyanosis, or edema  LYMPH: No lymphadenopathy noted  SKIN: No rashes or lesions    LABS:              CAPILLARY BLOOD GLUCOSE              Procalcitonin, Serum: 0.16 ng/mL (09-10 @ 22:45)        Urine Culture:      Blood Culture:      RADIOLOGY & ADDITIONAL TESTS:    Imaging Personally Reviewed:  [ ] YES  [ ] NO    Consultant(s) Notes Reviewed:  [x ] YES  [ ] NO    Care Discussed with Consultants/Other Providers [ ] YES  [ ] NO    PROBLEMS:  ALTERTED MENTAL STATUS, UNSPECIFIED ALTERTED MENTAL STATUS  SENT FROM [facility] FOR SEPSIS  Pneumonia of both lower lobes due to infectious organism  Altered mental status, unspecified altered mental status type  Chronic atrial fibrillation  Diastolic heart failure, stage C, chronic  Pulmonary HTN  Metabolic encephalopathy  Pleural effusion, bilateral  Essential hypertension  Altered mental status, unspecified altered mental status type  Pneumonia of both lower lobes due to infectious organism      Care discussed with family,         [x  ]   yes  [  ]  No    imp:    stable[ ]    unstable[  ]     improving [   ]       unchanged  [  ]                Plans:  Continue present plans  [  ]               New consult [  ]   specialty  .......               order sam[  ]    test name.                  Discharge Planning  [ x ]

## 2017-09-16 NOTE — PROGRESS NOTE ADULT - SUBJECTIVE AND OBJECTIVE BOX
elderly w multiple chronic nd end stage disease  ros ne exam unchangede   pt is DNR  poor prognosis   cont sx management  waiting for subacute palcement

## 2017-09-17 LAB
ANION GAP SERPL CALC-SCNC: 9 MMOL/L — SIGNIFICANT CHANGE UP (ref 5–17)
BUN SERPL-MCNC: 28 MG/DL — HIGH (ref 7–23)
CALCIUM SERPL-MCNC: 7.9 MG/DL — LOW (ref 8.5–10.1)
CHLORIDE SERPL-SCNC: 99 MMOL/L — SIGNIFICANT CHANGE UP (ref 96–108)
CO2 SERPL-SCNC: 28 MMOL/L — SIGNIFICANT CHANGE UP (ref 22–31)
CREAT SERPL-MCNC: 1.48 MG/DL — HIGH (ref 0.5–1.3)
GLUCOSE SERPL-MCNC: 79 MG/DL — SIGNIFICANT CHANGE UP (ref 70–99)
HCT VFR BLD CALC: 34.3 % — LOW (ref 39–50)
HGB BLD-MCNC: 11.3 G/DL — LOW (ref 13–17)
MCHC RBC-ENTMCNC: 28.5 PG — SIGNIFICANT CHANGE UP (ref 27–34)
MCHC RBC-ENTMCNC: 32.9 GM/DL — SIGNIFICANT CHANGE UP (ref 32–36)
MCV RBC AUTO: 86.6 FL — SIGNIFICANT CHANGE UP (ref 80–100)
PLATELET # BLD AUTO: 164 K/UL — SIGNIFICANT CHANGE UP (ref 150–400)
POTASSIUM SERPL-MCNC: 3.1 MMOL/L — LOW (ref 3.5–5.3)
POTASSIUM SERPL-SCNC: 3.1 MMOL/L — LOW (ref 3.5–5.3)
RBC # BLD: 3.97 M/UL — LOW (ref 4.2–5.8)
RBC # FLD: 16.3 % — HIGH (ref 11–15)
SODIUM SERPL-SCNC: 136 MMOL/L — SIGNIFICANT CHANGE UP (ref 135–145)
WBC # BLD: 12.4 K/UL — HIGH (ref 3.8–10.5)
WBC # FLD AUTO: 12.4 K/UL — HIGH (ref 3.8–10.5)

## 2017-09-17 RX ADMIN — Medication 20 MILLIGRAM(S): at 22:53

## 2017-09-17 RX ADMIN — Medication 20 MILLIGRAM(S): at 05:35

## 2017-09-17 RX ADMIN — LATANOPROST 1 DROP(S): 0.05 SOLUTION/ DROPS OPHTHALMIC; TOPICAL at 22:53

## 2017-09-17 RX ADMIN — Medication 20 MILLIGRAM(S): at 13:29

## 2017-09-17 NOTE — PROGRESS NOTE ADULT - SUBJECTIVE AND OBJECTIVE BOX
INTERVAL HPI:  Pt is a 89 yo gentleman with a pmhx of CHF, CABG, who presents to the ED with decreased alertness coming from [facility]. He is at baseline responsive and interactive per nephew, but over the past day or so has been less responsive and just staring ahead and not eating. No reported fevers, patient is not speaking currently. Is DNR/DNI.  Admitted for pneumonia (07 Sep 2017 10:33)    OVERNIGHT EVENTS:  Resting comfortably.    Vital Signs Last 24 Hrs  T(C): 36.2 (17 Sep 2017 11:38), Max: 36.2 (17 Sep 2017 11:38)  T(F): 97.2 (17 Sep 2017 11:38), Max: 97.2 (17 Sep 2017 11:38)  HR: 71 (17 Sep 2017 11:38) (70 - 83)  BP: 95/46 (17 Sep 2017 11:38) (95/46 - 126/67)  BP(mean): --  RR: 16 (17 Sep 2017 11:38) (16 - 18)  SpO2: 97% (17 Sep 2017 11:38) (95% - 98%)    PHYSICAL EXAM:  GEN:         Awake, responsive and comfortable.  HEENT:    Normal.    RESP:       no wheezing.  CVS:         Regular rate and rhythm.   ABD:         Soft, non-tender, non-distended;     MEDICATIONS  (STANDING):  latanoprost 0.005% Ophthalmic Solution 1 Drop(s) Both EYES at bedtime  sildenafil (REVATIO) 20 milliGRAM(s) Oral every 8 hours    LABS:                        11.3   12.4  )-----------( 164      ( 17 Sep 2017 11:43 )             34.3     09-17    136  |  99  |  28<H>  ----------------------------<  79  3.1<L>   |  28  |  1.48<H>    Ca    7.9<L>      17 Sep 2017 11:43      ASSESSMENT AND PLAN:  ·	Altered mental status better.  ·	Multifocal Pneumonia. Clinically non toxic.  ·	Bilateral pleural effusion.(reported to be transudate in [facility].)  ·	Hypoglycemia improved.  ·	Hypokalemia.  ·	Anemia.  ·	A Fib.  ·	S/P AVR.  ·	Diastolic CHF).  ·	Severe  TR.  ·	Severe pHTN.  ·	HTN history.      Continue Revatio.

## 2017-09-17 NOTE — PROGRESS NOTE ADULT - SUBJECTIVE AND OBJECTIVE BOX
Patient is a 89y old  Male who presents with a chief complaint of AMS (11 Sep 2017 14:45)      INTERVAL HPI/OVERNIGHT EVENTS: Asymptomatic    MEDICATIONS  (STANDING):  latanoprost 0.005% Ophthalmic Solution 1 Drop(s) Both EYES at bedtime  sildenafil (REVATIO) 20 milliGRAM(s) Oral every 8 hours    MEDICATIONS  (PRN):      Allergies    No Known Allergies    Intolerances        REVIEW OF SYSTEMS:  CONSTITUTIONAL: No fever, weight loss, or fatigue  EYES: No eye pain, visual disturbances, or discharge  ENMT:  No difficulty hearing, tinnitus, vertigo; No sinus or throat pain  NECK: No pain or stiffness  BREASTS: No pain, masses, or nipple discharge  RESPIRATORY: No cough, wheezing, chills or hemoptysis; No shortness of breath  CARDIOVASCULAR: No chest pain, palpitations, dizziness, or leg swelling  GASTROINTESTINAL: No abdominal or epigastric pain. No nausea, vomiting, or hematemesis; No diarrhea or constipation. No melena or hematochezia.  GENITOURINARY: No dysuria, frequency, hematuria, or incontinence  NEUROLOGICAL: No headaches, memory loss, loss of strength, numbness, or tremors  SKIN: No itching, burning, rashes, or lesions   LYMPH NODES: No enlarged glands  ENDOCRINE: No heat or cold intolerance; No hair loss  MUSCULOSKELETAL: No joint pain or swelling; No muscle, back, or extremity pain  PSYCHIATRIC: No depression, anxiety, mood swings, or difficulty sleeping  HEME/LYMPH: No easy bruising, or bleeding gums  ALLERGY AND IMMUNOLOGIC: No hives or eczema    Vital Signs Last 24 Hrs  T(C): 36.1 (17 Sep 2017 05:31), Max: 36.6 (16 Sep 2017 11:57)  T(F): 97 (17 Sep 2017 05:31), Max: 97.9 (16 Sep 2017 11:57)  HR: 76 (17 Sep 2017 05:31) (70 - 83)  BP: 126/67 (17 Sep 2017 05:31) (101/53 - 126/67)  BP(mean): --  RR: 16 (17 Sep 2017 05:31) (16 - 18)  SpO2: 95% (17 Sep 2017 05:31) (95% - 98%)    PHYSICAL EXAM:  GENERAL: NAD, well-groomed, well-developed  HEAD:  Atraumatic, Normocephalic  EYES: EOMI, EDGAR, conjunctiva and sclera clear  ENMT:  Moist mucous membranes, Good dentition, No lesions  NECK: Supple, No JVD, Normal thyroid  NERVOUS SYSTEM:  Alert & Oriented X3, Good concentration; Motor Strength 5/5 B/L upper and lower extremities; DTRs 2+ intact and symmetric  CHEST/LUNG: Clear to percussion bilaterally; No rales, rhonchi, wheezing, or rubs. Diminished air flow bilateral.  HEART: Regular rate and rhythm; No murmurs, rubs, or gallops  ABDOMEN: Soft, Nontender, Nondistended; Bowel sounds present  EXTREMITIES:  2+ Peripheral Pulses, No clubbing, cyanosis, or edema  LYMPH: No lymphadenopathy noted  SKIN: No rashes or lesions    LABS:              CAPILLARY BLOOD GLUCOSE              Procalcitonin, Serum: 0.16 ng/mL (09-10 @ 22:45)        Urine Culture:      Blood Culture:      RADIOLOGY & ADDITIONAL TESTS:    Imaging Personally Reviewed:  [ ] YES  [ ] NO    Consultant(s) Notes Reviewed:  [ ] YES  [ ] NO    Care Discussed with Consultants/Other Providers [ ] YES  [ ] NO    PROBLEMS:  ALTERTED MENTAL STATUS, UNSPECIFIED ALTERTED MENTAL STATUS  SENT FROM [facility] FOR SEPSIS  Pneumonia of both lower lobes due to infectious organism  Altered mental status, unspecified altered mental status type  Chronic atrial fibrillation  Diastolic heart failure, stage C, chronic  Pulmonary HTN  Metabolic encephalopathy  Pleural effusion, bilateral  Essential hypertension  Altered mental status, unspecified altered mental status type  Pneumonia of both lower lobes due to infectious organism      Care discussed with family,         [  ]   yes  [  ]  No    imp:    stable[ ]    unstable[  ]     improving [   ]       unchanged  [  ]                Plans:  Continue present plans  [  ]               New consult [  ]   specialty  .......               order sam[  ]    test name.                  Discharge Planning  [  ]

## 2017-09-17 NOTE — PROGRESS NOTE ADULT - ASSESSMENT
Pt is a 89 yo gentleman with a pmhx of CHF, CABG, who presents to the ED with decreased alertness coming from [facility]. He is at baseline responsive and interactive per nephew, but over the past day or so has been less responsive and just staring ahead and not eating. No reported fevers, patient is not speaking currently. Is DNR/DNI.  Admitted for pneumonia. As per HCP, Pt. does not want any antibiotics or further tests. Palliative care consult called and done. Noted. Pt, referred to Hospice care network.  spoke to the HCP today, now pt. wants IV antibiotics and all the tests necessary. Will strart on IV antibiotics, Will get ID and Pulmonary consults.Pulmonary consult noted. CT sacn chest noted, left upper lobe pneumonia. has bilateral pleural effusion. Will get IR consult for Thoracentesis .HCP refused consent for thoracentesis. Continue IV antibiotics. Discussed with pulmonary, Started on revatio. Pt. stable, Cleared for discharge by ID and Pulmonary. Awaiting auth. from Insurance. Follow BMP and CBC. Does not want to get out of the bed.

## 2017-09-18 VITALS
TEMPERATURE: 98 F | SYSTOLIC BLOOD PRESSURE: 103 MMHG | DIASTOLIC BLOOD PRESSURE: 62 MMHG | RESPIRATION RATE: 16 BRPM | HEART RATE: 88 BPM | OXYGEN SATURATION: 96 %

## 2017-09-18 RX ORDER — POTASSIUM CHLORIDE 20 MEQ
40 PACKET (EA) ORAL ONCE
Qty: 0 | Refills: 0 | Status: DISCONTINUED | OUTPATIENT
Start: 2017-09-18 | End: 2017-09-18

## 2017-09-18 RX ADMIN — Medication 20 MILLIGRAM(S): at 05:46

## 2017-09-18 NOTE — PROGRESS NOTE ADULT - SUBJECTIVE AND OBJECTIVE BOX
Patient is a 89y old  Male who presents with a chief complaint of AMS (11 Sep 2017 14:45)      INTERVAL HPI/OVERNIGHT EVENTS:    MEDICATIONS  (STANDING):  latanoprost 0.005% Ophthalmic Solution 1 Drop(s) Both EYES at bedtime  sildenafil (REVATIO) 20 milliGRAM(s) Oral every 8 hours    MEDICATIONS  (PRN):      Allergies    No Known Allergies    Intolerances        REVIEW OF SYSTEMS:  CONSTITUTIONAL: No fever, weight loss, or fatigue  EYES: No eye pain, visual disturbances, or discharge  ENMT:  No difficulty hearing, tinnitus, vertigo; No sinus or throat pain  NECK: No pain or stiffness  BREASTS: No pain, masses, or nipple discharge  RESPIRATORY: No cough, wheezing, chills or hemoptysis; No shortness of breath  CARDIOVASCULAR: No chest pain, palpitations, dizziness, or leg swelling  GASTROINTESTINAL: No abdominal or epigastric pain. No nausea, vomiting, or hematemesis; No diarrhea or constipation. No melena or hematochezia.  GENITOURINARY: No dysuria, frequency, hematuria, or incontinence  NEUROLOGICAL: No headaches, memory loss, loss of strength, numbness, or tremors  SKIN: No itching, burning, rashes, or lesions   LYMPH NODES: No enlarged glands  ENDOCRINE: No heat or cold intolerance; No hair loss  MUSCULOSKELETAL: No joint pain or swelling; No muscle, back, or extremity pain  PSYCHIATRIC: No depression, anxiety, mood swings, or difficulty sleeping  HEME/LYMPH: No easy bruising, or bleeding gums  ALLERGY AND IMMUNOLOGIC: No hives or eczema    Vital Signs Last 24 Hrs  T(C): 36.1 (18 Sep 2017 05:00), Max: 36.7 (17 Sep 2017 17:05)  T(F): 97 (18 Sep 2017 05:00), Max: 98 (17 Sep 2017 17:05)  HR: 68 (18 Sep 2017 05:00) (68 - 82)  BP: 114/63 (18 Sep 2017 05:00) (95/46 - 114/63)  BP(mean): --  RR: 16 (18 Sep 2017 05:00) (16 - 17)  SpO2: 93% (18 Sep 2017 05:00) (93% - 99%)    PHYSICAL EXAM:  GENERAL: NAD, well-groomed, well-developed. lethargic, Cachectic.  HEAD:  Atraumatic, Normocephalic  EYES: EOMI, EDGAR, conjunctiva and sclera clear  ENMT:  Moist mucous membranes, Good dentition, No lesions  NECK: Supple, No JVD, Normal thyroid  NERVOUS SYSTEM:  Alert & Oriented X3, Good concentration; Motor Strength 5/5 B/L upper and lower extremities; DTRs 2+ intact and symmetric  CHEST/LUNG: Clear to percussion bilaterally; No rales, rhonchi, wheezing, or rubs. [id number] air flow bilat.  HEART: Regular rate and rhythm; No murmurs, rubs, or gallops  ABDOMEN: Soft, Nontender, Nondistended; Bowel sounds present  EXTREMITIES:  2+ Peripheral Pulses, No clubbing, cyanosis, or edema  LYMPH: No lymphadenopathy noted  SKIN: No rashes or lesions    LABS:                        11.3   12.4  )-----------( 164      ( 17 Sep 2017 11:43 )             34.3     09-17    136  |  99  |  28<H>  ----------------------------<  79  3.1<L>   |  28  |  1.48<H>    Ca    7.9<L>      17 Sep 2017 11:43          CAPILLARY BLOOD GLUCOSE      Urine Culture:      Blood Culture:      RADIOLOGY & ADDITIONAL TESTS:  < from: Xray Chest 1 View AP/PA. (09.12.17 @ 11:07) >    EXAM:  CHEST SINGLE VIEW                            PROCEDURE DATE:  09/12/2017          INTERPRETATION:  PROCEDURE: AP view of the chest.    CLINICAL INFORMATION: Pneumonia    COMPARISON: 9/6/2017    FINDINGS:     The patient is status post sternotomy and cardiac valve replacement.    There are bilateral lung opacities and pleural effusions without   significant change. The cardiac and mediastinal contours are prominent,   which may be due to magnification from AP technique and shallow   inspiration. The osseous structures are intact.    IMPRESSION:    Bilateral lung opacities and pleural effusions without significant change.                  DEJON GOOD M.D., ATTENDING RADIOLOGIST  This document has been electronically signed. Sep 12 2017  1:23PM                < end of copied text >    Imaging Personally Reviewed:  [ ] YES  [ ] NO    Consultant(s) Notes Reviewed:  [ ] YES  [ ] NO    Care Discussed with Consultants/Other Providers [ ] YES  [ ] NO    PROBLEMS:  ALTERTED MENTAL STATUS, UNSPECIFIED ALTERTED MENTAL STATUS  SENT FROM [facility] FOR SEPSIS  Pneumonia of both lower lobes due to infectious organism  Altered mental status, unspecified altered mental status type  Chronic atrial fibrillation  Diastolic heart failure, stage C, chronic  Pulmonary HTN  Metabolic encephalopathy  Pleural effusion, bilateral  Essential hypertension  Altered mental status, unspecified altered mental status type  Pneumonia of both lower lobes due to infectious organism      Care discussed with family,         [  ]   yes  [  ]  No    imp:    stable[x ]    unstable[  ]     improving [   ]       unchanged  [  ]                Plans:  Continue present plans  [  ]               New consult [  ]   specialty  .......               order sam[  ]    test name.                  Discharge Planning  [x  ]

## 2017-09-18 NOTE — PROGRESS NOTE ADULT - PROBLEM SELECTOR PLAN 1
IV antibiotics
Resoloved
Resolved
IV antibiotics
Resoloved

## 2017-09-18 NOTE — PROGRESS NOTE ADULT - PROBLEM SELECTOR PROBLEM 3
Essential hypertension
Pneumonia of both lower lobes due to infectious organism
Essential hypertension

## 2017-09-18 NOTE — PROGRESS NOTE ADULT - ASSESSMENT
Pt is a 89 yo gentleman with a pmhx of CHF, CABG, who presents to the ED with decreased alertness coming from [facility]. He is at baseline responsive and interactive per nephew, but over the past day or so has been less responsive and just staring ahead and not eating. No reported fevers, patient is not speaking currently. Is DNR/DNI.  Admitted for pneumonia. As per HCP, Pt. does not want any antibiotics or further tests. Palliative care consult called and done. Noted. Pt, [facility].  spoke to the HCP today, now pt. wants IV antibiotics and all the tests necessary. Will strart on IV antibiotics, Will get ID and Pulmonary consults.Pulmonary consult noted. CT sacn chest noted, left upper lobe pneumonia. has bilateral pleural effusion. Will get IR consult for Thoracentesis .HCP refused consent for thoracentesis. Continue IV antibiotics. Discussed with pulmonary, Started on revatio. Pt. stable, Cleared for discharge by ID and Pulmonary. Awaiting auth. from Insurance. Follow BMP and CBC. Does not want to get out of the bed. Discharge as planned.

## 2017-09-18 NOTE — PROGRESS NOTE ADULT - PROBLEM SELECTOR PROBLEM 4
Pleural effusion, bilateral

## 2017-09-18 NOTE — PROGRESS NOTE ADULT - PROBLEM SELECTOR PROBLEM 1
Essential hypertension
Pneumonia of both lower lobes due to infectious organism
Pneumonia of both lower lobes due to infectious organism
Essential hypertension
Pneumonia of both lower lobes due to infectious organism

## 2017-09-18 NOTE — PROGRESS NOTE ADULT - PROBLEM SELECTOR PROBLEM 2
Pleural effusion, bilateral
Altered mental status, unspecified altered mental status type
Altered mental status, unspecified altered mental status type
Pleural effusion, bilateral
Altered mental status, unspecified altered mental status type

## 2017-09-18 NOTE — PROGRESS NOTE ADULT - PROBLEM SELECTOR PROBLEM 7
Diastolic heart failure, stage C, chronic

## 2017-09-18 NOTE — PROGRESS NOTE ADULT - PROVIDER SPECIALTY LIST ADULT
Cardiology
Infectious Disease
Internal Medicine
Intervent Radiology
Palliative Care
Pulmonology
Infectious Disease
Infectious Disease
Internal Medicine

## 2017-09-18 NOTE — PROGRESS NOTE ADULT - PROBLEM SELECTOR PROBLEM 8
Chronic atrial fibrillation

## 2017-09-20 DIAGNOSIS — I50.32 CHRONIC DIASTOLIC (CONGESTIVE) HEART FAILURE: ICD-10-CM

## 2017-09-20 DIAGNOSIS — E16.2 HYPOGLYCEMIA, UNSPECIFIED: ICD-10-CM

## 2017-09-20 DIAGNOSIS — I45.10 UNSPECIFIED RIGHT BUNDLE-BRANCH BLOCK: ICD-10-CM

## 2017-09-20 DIAGNOSIS — Z95.2 PRESENCE OF PROSTHETIC HEART VALVE: ICD-10-CM

## 2017-09-20 DIAGNOSIS — E87.6 HYPOKALEMIA: ICD-10-CM

## 2017-09-20 DIAGNOSIS — G93.41 METABOLIC ENCEPHALOPATHY: ICD-10-CM

## 2017-09-20 DIAGNOSIS — Z95.1 PRESENCE OF AORTOCORONARY BYPASS GRAFT: ICD-10-CM

## 2017-09-20 DIAGNOSIS — I27.2 OTHER SECONDARY PULMONARY HYPERTENSION: ICD-10-CM

## 2017-09-20 DIAGNOSIS — A41.9 SEPSIS, UNSPECIFIED ORGANISM: ICD-10-CM
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
## 2017-09-20 DIAGNOSIS — J18.9 PNEUMONIA, UNSPECIFIED ORGANISM: ICD-10-CM

## 2017-09-20 DIAGNOSIS — D64.9 ANEMIA, UNSPECIFIED: ICD-10-CM

## 2017-09-20 DIAGNOSIS — I48.2 CHRONIC ATRIAL FIBRILLATION: ICD-10-CM

## 2017-09-20 DIAGNOSIS — I11.0 HYPERTENSIVE HEART DISEASE WITH HEART FAILURE: ICD-10-CM

## 2017-09-20 DIAGNOSIS — I07.1 RHEUMATIC TRICUSPID INSUFFICIENCY: ICD-10-CM

## 2017-10-04 ENCOUNTER — OUTPATIENT (OUTPATIENT)
Dept: OUTPATIENT SERVICES | Facility: HOSPITAL | Age: 82
LOS: 1 days | Discharge: HOME | End: 2017-10-04
Payer: COMMERCIAL

## 2017-10-04 ENCOUNTER — APPOINTMENT (OUTPATIENT)
Dept: RADIOLOGY | Facility: HOSPITAL | Age: 82
End: 2017-10-04

## 2017-10-04 DIAGNOSIS — K56.69 OTHER INTESTINAL OBSTRUCTION: Chronic | ICD-10-CM

## 2017-10-04 DIAGNOSIS — Z95.2 PRESENCE OF PROSTHETIC HEART VALVE: Chronic | ICD-10-CM

## 2017-10-04 DIAGNOSIS — R13.12 DYSPHAGIA, OROPHARYNGEAL PHASE: ICD-10-CM

## 2017-10-04 PROCEDURE — 70371 SPEECH EVALUATION COMPLEX: CPT | Mod: 26

## 2017-10-06 ENCOUNTER — OUTPATIENT (OUTPATIENT)
Dept: OUTPATIENT SERVICES | Facility: HOSPITAL | Age: 82
LOS: 1 days | Discharge: HOME | End: 2017-10-06
Payer: COMMERCIAL

## 2017-10-06 DIAGNOSIS — K56.69 OTHER INTESTINAL OBSTRUCTION: Chronic | ICD-10-CM

## 2017-10-06 DIAGNOSIS — J18.9 PNEUMONIA, UNSPECIFIED ORGANISM: ICD-10-CM

## 2017-10-06 DIAGNOSIS — Z95.2 PRESENCE OF PROSTHETIC HEART VALVE: Chronic | ICD-10-CM

## 2017-10-06 PROCEDURE — 71010: CPT | Mod: 26

## 2017-10-13 ENCOUNTER — EMERGENCY (EMERGENCY)
Facility: HOSPITAL | Age: 82
LOS: 0 days | Discharge: HOME | End: 2017-10-13
Attending: EMERGENCY MEDICINE
Payer: COMMERCIAL

## 2017-10-13 VITALS
OXYGEN SATURATION: 95 % | WEIGHT: 110.01 LBS | HEART RATE: 80 BPM | HEIGHT: 67 IN | DIASTOLIC BLOOD PRESSURE: 59 MMHG | RESPIRATION RATE: 24 BRPM | SYSTOLIC BLOOD PRESSURE: 116 MMHG

## 2017-10-13 VITALS
RESPIRATION RATE: 22 BRPM | HEART RATE: 68 BPM | TEMPERATURE: 98 F | DIASTOLIC BLOOD PRESSURE: 68 MMHG | OXYGEN SATURATION: 96 % | SYSTOLIC BLOOD PRESSURE: 117 MMHG

## 2017-10-13 DIAGNOSIS — R06.02 SHORTNESS OF BREATH: ICD-10-CM

## 2017-10-13 DIAGNOSIS — I48.91 UNSPECIFIED ATRIAL FIBRILLATION: ICD-10-CM

## 2017-10-13 DIAGNOSIS — I50.9 HEART FAILURE, UNSPECIFIED: ICD-10-CM

## 2017-10-13 DIAGNOSIS — K56.609 UNSPECIFIED INTESTINAL OBSTRUCTION, UNSPECIFIED AS TO PARTIAL VERSUS COMPLETE OBSTRUCTION: ICD-10-CM

## 2017-10-13 DIAGNOSIS — Z95.2 PRESENCE OF PROSTHETIC HEART VALVE: Chronic | ICD-10-CM

## 2017-10-13 DIAGNOSIS — J90 PLEURAL EFFUSION, NOT ELSEWHERE CLASSIFIED: ICD-10-CM

## 2017-10-13 DIAGNOSIS — R06.00 DYSPNEA, UNSPECIFIED: ICD-10-CM

## 2017-10-13 DIAGNOSIS — K56.69 OTHER INTESTINAL OBSTRUCTION: Chronic | ICD-10-CM

## 2017-10-13 DIAGNOSIS — I10 ESSENTIAL (PRIMARY) HYPERTENSION: ICD-10-CM

## 2017-10-13 LAB
ALBUMIN SERPL ELPH-MCNC: 2.1 G/DL — LOW (ref 3.3–5)
ALP SERPL-CCNC: 90 U/L — SIGNIFICANT CHANGE UP (ref 40–120)
ALT FLD-CCNC: 18 U/L — SIGNIFICANT CHANGE UP (ref 12–78)
ANION GAP SERPL CALC-SCNC: 10 MMOL/L — SIGNIFICANT CHANGE UP (ref 5–17)
ANISOCYTOSIS BLD QL: SLIGHT — SIGNIFICANT CHANGE UP
APTT BLD: 30.1 SEC — SIGNIFICANT CHANGE UP (ref 27.5–37.4)
AST SERPL-CCNC: 21 U/L — SIGNIFICANT CHANGE UP (ref 15–37)
BILIRUB SERPL-MCNC: 0.5 MG/DL — SIGNIFICANT CHANGE UP (ref 0.2–1.2)
BUN SERPL-MCNC: 41 MG/DL — HIGH (ref 7–23)
CALCIUM SERPL-MCNC: 8.1 MG/DL — LOW (ref 8.5–10.1)
CHLORIDE SERPL-SCNC: 105 MMOL/L — SIGNIFICANT CHANGE UP (ref 96–108)
CO2 SERPL-SCNC: 25 MMOL/L — SIGNIFICANT CHANGE UP (ref 22–31)
CREAT SERPL-MCNC: 1.13 MG/DL — SIGNIFICANT CHANGE UP (ref 0.5–1.3)
ELLIPTOCYTES BLD QL SMEAR: SLIGHT — SIGNIFICANT CHANGE UP
EOSINOPHIL NFR BLD AUTO: 2 % — SIGNIFICANT CHANGE UP (ref 0–6)
GLUCOSE SERPL-MCNC: 93 MG/DL — SIGNIFICANT CHANGE UP (ref 70–99)
HCT VFR BLD CALC: 30.2 % — LOW (ref 39–50)
HGB BLD-MCNC: 9.8 G/DL — LOW (ref 13–17)
HYPOCHROMIA BLD QL: SLIGHT — SIGNIFICANT CHANGE UP
INR BLD: 1.01 RATIO — SIGNIFICANT CHANGE UP (ref 0.88–1.16)
LACTATE SERPL-SCNC: 0.9 MMOL/L — SIGNIFICANT CHANGE UP (ref 0.7–2)
LYMPHOCYTES # BLD AUTO: 31 % — SIGNIFICANT CHANGE UP (ref 13–44)
MACROCYTES BLD QL: SLIGHT — SIGNIFICANT CHANGE UP
MCHC RBC-ENTMCNC: 29.6 PG — SIGNIFICANT CHANGE UP (ref 27–34)
MCHC RBC-ENTMCNC: 32.3 GM/DL — SIGNIFICANT CHANGE UP (ref 32–36)
MCV RBC AUTO: 91.7 FL — SIGNIFICANT CHANGE UP (ref 80–100)
MICROCYTES BLD QL: SLIGHT — SIGNIFICANT CHANGE UP
MONOCYTES NFR BLD AUTO: 5 % — SIGNIFICANT CHANGE UP (ref 2–14)
NEUTROPHILS NFR BLD AUTO: 61 % — SIGNIFICANT CHANGE UP (ref 43–77)
PLAT MORPH BLD: NORMAL — SIGNIFICANT CHANGE UP
PLATELET # BLD AUTO: 296 K/UL — SIGNIFICANT CHANGE UP (ref 150–400)
POTASSIUM SERPL-MCNC: 3.8 MMOL/L — SIGNIFICANT CHANGE UP (ref 3.5–5.3)
POTASSIUM SERPL-SCNC: 3.8 MMOL/L — SIGNIFICANT CHANGE UP (ref 3.5–5.3)
PROT SERPL-MCNC: 6 GM/DL — SIGNIFICANT CHANGE UP (ref 6–8.3)
PROTHROM AB SERPL-ACNC: 11 SEC — SIGNIFICANT CHANGE UP (ref 9.8–12.7)
RBC # BLD: 3.3 M/UL — LOW (ref 4.2–5.8)
RBC # FLD: 20.5 % — HIGH (ref 11–15)
RBC BLD AUTO: ABNORMAL
SODIUM SERPL-SCNC: 140 MMOL/L — SIGNIFICANT CHANGE UP (ref 135–145)
VARIANT LYMPHS # BLD: 1 % — SIGNIFICANT CHANGE UP (ref 0–6)
WBC # BLD: 12.6 K/UL — HIGH (ref 3.8–10.5)
WBC # FLD AUTO: 12.6 K/UL — HIGH (ref 3.8–10.5)

## 2017-10-13 PROCEDURE — 99284 EMERGENCY DEPT VISIT MOD MDM: CPT

## 2017-10-13 PROCEDURE — 71010: CPT | Mod: 26

## 2017-10-13 NOTE — ED PROVIDER NOTE - MEDICAL DECISION MAKING DETAILS
Patient with multiple medical issues with known pleural effusions, discharged from [facility] yesterday; patient to receive outpatient hospice evaluation

## 2017-10-13 NOTE — ED ADULT TRIAGE NOTE - CHIEF COMPLAINT QUOTE
ems states, " [facility], c/o sob that started today, " pt is DNR

## 2017-10-13 NOTE — ED ADULT NURSE NOTE - OBJECTIVE STATEMENT
patient sent to ED for extended care living for sob. patient found with 100% non-rebreather face mask using accessory muscles for breathing.  patient non-verbal with grunting sounds.  patient appears weakened and pale in color.

## 2017-10-13 NOTE — ED ADULT NURSE NOTE - CHIEF COMPLAINT QUOTE
ems states, " [facility], c/o sob that started today, " pt is DNR

## 2017-10-14 ENCOUNTER — EMERGENCY (EMERGENCY)
Facility: HOSPITAL | Age: 82
LOS: 0 days | Discharge: HOME | End: 2017-10-14
Attending: EMERGENCY MEDICINE
Payer: COMMERCIAL

## 2017-10-14 VITALS
DIASTOLIC BLOOD PRESSURE: 52 MMHG | OXYGEN SATURATION: 99 % | RESPIRATION RATE: 16 BRPM | SYSTOLIC BLOOD PRESSURE: 102 MMHG | HEART RATE: 80 BPM

## 2017-10-14 VITALS
HEIGHT: 68 IN | SYSTOLIC BLOOD PRESSURE: 112 MMHG | RESPIRATION RATE: 16 BRPM | TEMPERATURE: 97 F | HEART RATE: 83 BPM | OXYGEN SATURATION: 93 % | DIASTOLIC BLOOD PRESSURE: 63 MMHG | WEIGHT: 89.95 LBS
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
## 2017-10-14 DIAGNOSIS — I50.9 HEART FAILURE, UNSPECIFIED: ICD-10-CM

## 2017-10-14 DIAGNOSIS — S51.819A LACERATION WITHOUT FOREIGN BODY OF UNSPECIFIED FOREARM, INITIAL ENCOUNTER: ICD-10-CM

## 2017-10-14 DIAGNOSIS — I10 ESSENTIAL (PRIMARY) HYPERTENSION: ICD-10-CM

## 2017-10-14 DIAGNOSIS — S51.011A LACERATION WITHOUT FOREIGN BODY OF RIGHT ELBOW, INITIAL ENCOUNTER: ICD-10-CM

## 2017-10-14 DIAGNOSIS — I48.91 UNSPECIFIED ATRIAL FIBRILLATION: ICD-10-CM

## 2017-10-14 DIAGNOSIS — B02.9 ZOSTER WITHOUT COMPLICATIONS: ICD-10-CM
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
## 2017-10-14 DIAGNOSIS — Z95.2 PRESENCE OF PROSTHETIC HEART VALVE: Chronic | ICD-10-CM

## 2017-10-14 DIAGNOSIS — K56.69 OTHER INTESTINAL OBSTRUCTION: Chronic | ICD-10-CM

## 2017-10-14 DIAGNOSIS — M25.521 PAIN IN RIGHT ELBOW: ICD-10-CM

## 2017-10-14 PROCEDURE — 72170 X-RAY EXAM OF PELVIS: CPT | Mod: 26

## 2017-10-14 PROCEDURE — 99285 EMERGENCY DEPT VISIT HI MDM: CPT | Mod: 25

## 2017-10-14 PROCEDURE — 73060 X-RAY EXAM OF HUMERUS: CPT | Mod: 26,RT

## 2017-10-14 PROCEDURE — 71110 X-RAY EXAM RIBS BIL 3 VIEWS: CPT | Mod: 26

## 2017-10-14 PROCEDURE — 70450 CT HEAD/BRAIN W/O DYE: CPT | Mod: 26

## 2017-10-14 PROCEDURE — 73070 X-RAY EXAM OF ELBOW: CPT | Mod: 26,59,RT

## 2017-10-14 PROCEDURE — 73080 X-RAY EXAM OF ELBOW: CPT | Mod: 26,RT

## 2017-10-14 RX ORDER — TRAVOPROST 0.04 MG/ML
1 SOLUTION/ DROPS OPHTHALMIC
Qty: 0 | Refills: 0 | COMMUNITY

## 2017-10-14 RX ORDER — ACETAMINOPHEN 500 MG
650 TABLET ORAL ONCE
Qty: 0 | Refills: 0 | Status: COMPLETED | OUTPATIENT
Start: 2017-10-14 | End: 2017-10-14

## 2017-10-14 RX ORDER — TETANUS TOXOID, REDUCED DIPHTHERIA TOXOID AND ACELLULAR PERTUSSIS VACCINE, ADSORBED 5; 2.5; 8; 8; 2.5 [IU]/.5ML; [IU]/.5ML; UG/.5ML; UG/.5ML; UG/.5ML
0.5 SUSPENSION INTRAMUSCULAR ONCE
Qty: 0 | Refills: 0 | Status: COMPLETED | OUTPATIENT
Start: 2017-10-14 | End: 2017-10-14

## 2017-10-14 RX ADMIN — TETANUS TOXOID, REDUCED DIPHTHERIA TOXOID AND ACELLULAR PERTUSSIS VACCINE, ADSORBED 0.5 MILLILITER(S): 5; 2.5; 8; 8; 2.5 SUSPENSION INTRAMUSCULAR at 06:41

## 2017-10-14 RX ADMIN — Medication 650 MILLIGRAM(S): at 06:41

## 2017-10-14 RX ADMIN — Medication 650 MILLIGRAM(S): at 07:10

## 2017-10-14 NOTE — ED ADULT NURSE REASSESSMENT NOTE - NS ED NURSE REASSESS COMMENT FT1
patient with d/c orders, temperature 95.8 rectally, dr. Mancilla informed and ordered warm blankets , then repeat temperature , same done as per verbal order. Patient offered lunch but refused, requested ensure instead which he tolerated well. will repeat temperature in 1 hour.

## 2017-10-14 NOTE — ED ADULT NURSE REASSESSMENT NOTE - NS ED NURSE REASSESS COMMENT FT1
patient received in bed awake, alert and orientated to his person and place, he admits to pain when he moves, but was not specific of the pain location. He was repositioned in bed and later send for his x-rays as ordered.

## 2017-10-14 NOTE — ED PROVIDER NOTE - PHYSICAL EXAMINATION
Gen: Alert, NAD  Head: NC, AT, PERRL, EOMI, normal lids/conjunctiva  ENT: normal hearing, patent oropharynx without erythema/exudate, uvula midline  Neck: +supple, no tenderness/meningismus/JVD, +Trachea midline  Chest: no chest wall tenderness, equal chest rise  Pulm: Bilateral BS, normal resp effort, no wheeze/stridor/retractions  CV: RRR, no M/R/G, +dist pulses  Abd: soft, NT/ND, +BS, no hepatosplenomegaly  Rectal: deferred  Mskel: superficial skin tear right prox forearm, +ttp right elbow  Back: +ttp left upper back/ribs  Skin: no rash  Neuro: AAOx3, no sensory/motor deficits, CN 2-12 intact

## 2017-10-14 NOTE — CONSULT NOTE ADULT - SUBJECTIVE AND OBJECTIVE BOX
89y Male right hand dominant presents to the ED initially with right elbow pain and left sided rib pain after fall out of bed in acute rehab facility. Pt denies numbness tingling paresthesias in affected limb. Pt admits to headstrike but denies LOC and denies any other orthopedic injuries at this time.     PAST MEDICAL & SURGICAL HISTORY:  Afib  CHF (congestive heart failure)  Shingles  SBO (small bowel obstruction)  HTN (hypertension)  S/P AVR (aortic valve replacement)  SBO (small bowel obstruction)    MEDICATIONS  (STANDING): please refer to med note     Allergies    No Known Allergies                                9.8    12.6  )-----------( 296      ( 13 Oct 2017 16:41 )             30.2     13 Oct 2017 16:41    140    |  105    |  41     ----------------------------<  93     3.8     |  25     |  1.13     Ca    8.1        13 Oct 2017 16:41    TPro  6.0    /  Alb  2.1    /  TBili  0.5    /  DBili  x      /  AST  21     /  ALT  18     /  AlkPhos  90     13 Oct 2017 16:41    PT/INR - ( 13 Oct 2017 16:41 )   PT: 11.0 sec;   INR: 1.01 ratio         PTT - ( 13 Oct 2017 16:41 )  PTT:30.1 sec  Vital Signs Last 24 Hrs  T(C): 35.8 (10-14-17 @ 06:11), Max: 36.7 (10-13-17 @ 15:46)  T(F): 96.5 (10-14-17 @ 06:11), Max: 98 (10-13-17 @ 15:46)  HR: 69 (10-14-17 @ 11:24) (68 - 84)  BP: 100/53 (10-14-17 @ 11:24) (99/54 - 117/68)  BP(mean): --  RR: 16 (10-14-17 @ 11:24) (16 - 24)  SpO2: 99% (10-14-17 @ 11:24) (93% - 100%)    Imaging: XR AP and lateral demonstrates possible nondisplaced radial head fracture  Radiocapitellar view: pending      Gen: NAD, AAOx3    RUE: 3 small skin abrasions on lateral elbow and distal volar forearm, non-TTP at elbow, no Swelling at elbow, no bony TTP at Shoulder/wrist/Hand/Fingers, +AIN/PIN/M/R/U/Msc/Ax, SILT C5-T1, Radial Pulse, compartments soft, hand is pink and warm    Secondary Survey: Full ROM of unaffected extremities, able to SLR B/L, SILT globally, compartments soft, no bony TTP over bony prominences, no calf TTP, no TTP along axial spine      A/P: 89y Male with possible right radial head nondisplaced fracture  -pain control  - radiocapitellar xray pending   -in sling, keep clean dry and intact  -NWB RUE  -keep splint clean dry intact  -rest ice elevate affected elbow  -no lifting with affected hand  - FU as outpatient with Dr. Porras 89y Male right hand dominant presents to the ED initially with right elbow pain and left sided rib pain after fall out of bed in acute rehab facility. Pt denies numbness, tingling, paresthesias in affected limb. Pt admits to headstrike but denies LOC and denies any other orthopedic injuries at this time.     PAST MEDICAL & SURGICAL HISTORY:  Afib  CHF (congestive heart failure)  Shingles  SBO (small bowel obstruction)  HTN (hypertension)  S/P AVR (aortic valve replacement)  SBO (small bowel obstruction)    MEDICATIONS  (STANDING): please refer to med note     Allergies    No Known Allergies                                9.8    12.6  )-----------( 296      ( 13 Oct 2017 16:41 )             30.2     13 Oct 2017 16:41    140    |  105    |  41     ----------------------------<  93     3.8     |  25     |  1.13     Ca    8.1        13 Oct 2017 16:41    TPro  6.0    /  Alb  2.1    /  TBili  0.5    /  DBili  x      /  AST  21     /  ALT  18     /  AlkPhos  90     13 Oct 2017 16:41    PT/INR - ( 13 Oct 2017 16:41 )   PT: 11.0 sec;   INR: 1.01 ratio         PTT - ( 13 Oct 2017 16:41 )  PTT:30.1 sec  Vital Signs Last 24 Hrs  T(C): 35.8 (10-14-17 @ 06:11), Max: 36.7 (10-13-17 @ 15:46)  T(F): 96.5 (10-14-17 @ 06:11), Max: 98 (10-13-17 @ 15:46)  HR: 69 (10-14-17 @ 11:24) (68 - 84)  BP: 100/53 (10-14-17 @ 11:24) (99/54 - 117/68)  BP(mean): --  RR: 16 (10-14-17 @ 11:24) (16 - 24)  SpO2: 99% (10-14-17 @ 11:24) (93% - 100%)    Imaging: XR AP and lateral demonstrates possible nondisplaced radial head fracture  Radiocapitellar XR view: no acute fx/dislocations apparent.      Gen: NAD, AAOx3    RUE: 3 small skin abrasions on lateral elbow and distal volar forearm, non-TTP at elbow, no pain with ROM, no block with pronation/supination, no Swelling at elbow, no bony TTP at Shoulder/wrist/Hand/Fingers, +AIN/PIN/M/R/U/Msc/Ax, SILT C5-T1, Radial Pulse, compartments soft, hand is pink and warm    Secondary Survey: Full ROM of unaffected extremities, able to SLR B/L, SILT globally, compartments soft, no bony TTP over bony prominences, no calf TTP, no TTP along axial spine    Procedure: xeroform and zonia placed over skin abrasions of right elbow. placed in sling. patient tolerated well.     A/P: 89y Male with right elbow pain. No acute fracture/dislocation of right elbow.   -pain control  - dvt ppx  - radiocapitellar xray pending official read  -in sling for comfort, keep clean dry and intact  - encouraged ROM next few days  -rest ice elevate affected elbow  - FU as outpatient with Dr. Porras within 7-10 days

## 2017-10-14 NOTE — ED PROVIDER NOTE - PMH
Afib    CHF (congestive heart failure)    HTN (hypertension)    SBO (small bowel obstruction)    Shingles

## 2017-10-14 NOTE — ED PROVIDER NOTE - PROGRESS NOTE DETAILS
Patient signed out from Dr. Britt as pending imaging, and resend back to nursing facility. Initial radiology was possible radial head fx, but repeat shows no fracture, orthopedics evaluated and placed in sling, with range of motion exercises recommended.

## 2017-10-14 NOTE — ED ADULT NURSE REASSESSMENT NOTE - NS ED NURSE REASSESS COMMENT FT1
Patient seen by orthopedic on consult for right elbow fracture, multiple skin tears and abrasion to right arm covered with xeroform and Michael. Sling applied by them and patient tolerated well.

## 2017-10-14 NOTE — ED PROVIDER NOTE - MEDICAL DECISION MAKING DETAILS
89yo M pmh as above sent here for eval after fall from bed. H&P most consistent with  fall. CT head to r/o bleed, plain films to r/o fracture, pain mngmt, adacel, and reassess. Likely DC back to acute facility.

## 2017-10-14 NOTE — ED PROVIDER NOTE - OBJECTIVE STATEMENT
Pertinent PMH/PSH/FHx/SHx and Review of Systems contained within:  89yo Male pmh inclusive of chf and afib, NOT on blood thinners, seen here [facility] for dyspnea and discharged back to acute care facility, [facility] for eval after fall from bed. Pt states he had a dream where he thought he was falling and actually awoke when falling to ground from bed. C/O right elbow pain. No fever/chills, No photophobia/eye pain/changes in vision, No ear pain/sore throat/dysphagia, No chest pain/palpitations, no SOB/cough/wheeze/stridor, No abdominal pain, No N/V/D, no dysuria/frequency/discharge, No neck/back pain, no rash, no changes in neurological status/function. Pertinent PMH/PSH/FHx/SHx and Review of Systems contained within:  89yo Male pmh inclusive of chf and afib, NOT on blood thinners, seen here [facility] for dyspnea and discharged back to acute care facility, [facility] for eval after fall from bed. Pt states he had a dream where he thought he was falling and actually awoke when falling to ground from bed. C/O right elbow pain and left posterior rib pain. Did strike head, no Loc. Denies head/neck pain. No fever/chills, No photophobia/eye pain/changes in vision, No ear pain/sore throat/dysphagia, No chest pain/palpitations, no SOB/cough/wheeze/stridor, No abdominal pain, No N/V/D, no dysuria/frequency/discharge, No neck, no rash, no changes in neurological status/function.

## 2017-10-19 LAB
CULTURE RESULTS: SIGNIFICANT CHANGE UP
CULTURE RESULTS: SIGNIFICANT CHANGE UP
SPECIMEN SOURCE: SIGNIFICANT CHANGE UP
SPECIMEN SOURCE: SIGNIFICANT CHANGE UP

## 2018-03-28 NOTE — ED ADULT NURSE NOTE - CAS EDN DISCHARGE INTERVENTIONS
Phone call to patient.  Pt. Notified of results and recommended follow up.   Pt. Verbalized understanding of everything discussed.    IV intact/Arm band on

## 2018-08-01 NOTE — SWALLOW BEDSIDE ASSESSMENT ADULT - NS ASR SWALLOW FINDINGS DISCUS
Chief Complaint   Patient presents with    Anticoagulation    Hypertension    Diabetes     1. Have you been to the ER, urgent care clinic since your last visit? Hospitalized since your last visit? Yes Where: [facility] ED :Fall hit head on floor: MRI    2. Have you seen or consulted any other health care providers outside of the [address] [place] [address] since your last visit? Include any pap smears or colon screening. No     Results for orders placed or performed in visit on 08/01/18   AMB POC PT/INR   Result Value Ref Range    VALID INTERNAL CONTROL POC Yes     Prothrombin time (POC) 36.7 seconds    INR POC 3.1        Chief Complaint   Patient presents with    Anticoagulation    Hypertension    Diabetes     She is a 80 y.o. female who presents for evalution. Reviewed PmHx, RxHx, FmHx, SocHx, AllgHx and updated and dated in the chart.     Patient Active Problem List    Diagnosis    A-fib ([address]    Dyslipidemia    Chronic anticoagulation    Constipation    History of pulmonary embolism    Type 2 diabetes with nephropathy [facility]    Advanced care planning/counseling discussion     Has no completed, dwp importance      Controlled type 2 diabetes mellitus without complication, without long-term current use of insulin ([address]    Iron deficiency anemia    Advance care planning     Has a LW      Pulmonary embolism ([address]    Itching    Elevated liver enzymes    S/P cholecystectomy     11/2013      S/P knee replacement     bilaterally      S/P shoulder surgery     Left      H/O carpal tunnel repair     Right hand      Chronic pain    HTN (hypertension)    OA (osteoarthritis)    Fibromyalgia    GERD (gastroesophageal reflux disease)    High cholesterol    Hiatal hernia       Review of Systems - negative except as listed above in the HPI    Objective:     Vitals:    08/01/18 0711   BP: 145/84   Pulse: 88   Resp: 16   Temp: 97.5 °F (36.4 °C)   TempSrc: Oral   SpO2: 93%   Weight: 171 lb (77.6 kg) Height: 5' 7\" (1.702 m)     Physical Examination: General appearance - alert, well appearing, and in no distress  Chest - clear to auscultation, no wheezes, rales or rhonchi, symmetric air entry  Heart - normal rate, regular rhythm, normal S1, S2, no murmurs, rubs, clicks or gallops    Assessment/ Plan:   Diagnoses and all orders for this visit:    1. Paroxysmal atrial fibrillation (HCC)  -     AMB POC PT/INR=3.1  -no changes       Follow-up Disposition:  Return in about 1 month (around 9/1/2018). I have discussed the diagnosis with the patient and the intended plan as seen in the above orders. The patient understands and agrees with the plan. The patient has received an after-visit summary and questions were answered concerning future plans. Medication Side Effects and Warnings were discussed with patient  Patient Labs were reviewed and or requested:  Patient Past Records were reviewed and or requested    Will KEM Franco Patient Instructions        Preventing Falls: Care Instructions  Your Care Instructions    Getting around your home safely can be a challenge if you have injuries or health problems that make it easy for you to fall. Loose rugs and furniture in walkways are among the dangers for many older people who have problems walking or who have poor eyesight. People who have conditions such as arthritis, osteoporosis, or dementia also have to be careful not to fall. You can make your home safer with a few simple measures. Follow-up care is a key part of your treatment and safety. Be sure to make and go to all appointments, and call your doctor if you are having problems. It's also a good idea to know your test results and keep a list of the medicines you take. How can you care for yourself at home? Taking care of yourself  · You may get dizzy if you do not drink enough water. To prevent dehydration, drink plenty of fluids, enough so that your urine is light yellow or clear like water. Choose water and other caffeine-free clear liquids. If you have kidney, heart, or liver disease and have to limit fluids, talk with your doctor before you increase the amount of fluids you drink. · Exercise regularly to improve your strength, muscle tone, and balance. Walk if you can. Swimming may be a good choice if you cannot walk easily. · Have your vision and hearing checked each year or any time you notice a change. If you have trouble seeing and hearing, you might not be able to avoid objects and could lose your balance. · Know the side effects of the medicines you take. Ask your doctor or pharmacist whether the medicines you take can affect your balance. Sleeping pills or sedatives can affect your balance. · Limit the amount of alcohol you drink. Alcohol can impair your balance and other senses. · Ask your doctor whether calluses or corns on your feet need to be removed. If you wear loose-fitting shoes because of calluses or corns, you can lose your balance and fall. · Talk to your doctor if you have numbness in your feet. Preventing falls at home  · Remove raised doorway thresholds, throw rugs, and clutter. Repair loose carpet or raised areas in the floor. · Move furniture and electrical cords to keep them out of walking paths. · Use nonskid floor wax, and wipe up spills right away, especially on ceramic tile floors. · If you use a walker or cane, put rubber tips on it. If you use crutches, clean the bottoms of them regularly with an abrasive pad, such as steel wool. · Keep your house well lit, especially [place], and outside walkways. Use night-lights in areas such as hallways and bathrooms. Add extra light switches or use remote switches (such as switches that go on or off when you clap your hands) to make it easier to turn lights on if you have to get up during the night. · Install sturdy handrails on stairways.   · Move items in your cabinets so that the things you use a lot are on the lower shelves (about waist level). · Keep a cordless phone and a flashlight with new batteries by your bed. If possible, put a phone in each of the main rooms of your house, or carry a cell phone in case you fall and cannot reach a phone. Or, you can wear a device around your neck or wrist. You push a button that sends a signal for help. · Wear low-heeled shoes that fit well and give your feet good support. Use footwear with nonskid soles. Check the heels and soles of your shoes for wear. Repair or replace worn heels or soles. · Do not wear socks without shoes on wood floors. · Walk on the grass when the sidewalks are slippery. If you live in an area that gets snow and ice in the winter, sprinkle salt on slippery steps and sidewalks. Preventing falls in the bath  · Install grab bars and nonskid mats inside and outside your shower or tub and near the toilet and sinks. · Use shower chairs and bath benches. · Use a hand-held shower head that will allow you to sit while showering. · Get into a tub or shower by putting the weaker leg in first. Get out of a tub or shower with your strong side first.  · Repair loose toilet seats and consider installing a raised toilet seat to make getting on and off the toilet easier. · Keep your bathroom door unlocked while you are in the shower. Where can you learn more? Go to http://leonel-socorro.info/. Enter [phone] in the search box to learn more about \"Preventing Falls: Care Instructions. \"  Current as of: May 12, 2017  Content Version: 11.7  © 3237-6810 [employer]. Care instructions adapted under license by [employer] (which disclaims liability or warranty for this information). If you have questions about a medical condition or this instruction, always ask your healthcare professional. [address]  any warranty or liability for your use of this information. Patient

## 2018-12-05 NOTE — PATIENT PROFILE ADULT. - TOBACCO USE
Left message for patient to reschedule appointment with Kiki Palacios on December 17, 2018 per patient request  
Unknown if ever smoked

## 2020-10-05 NOTE — PHYSICAL THERAPY INITIAL EVALUATION ADULT - GENERAL OBSERVATIONS, REHAB EVAL
Patient calling to initiate prenatal care  LMP unknown    Patient is 7-8 weeks unknown  [facility]  Good time to return phone call today anytime    Patient does not know her LMP.  She states that Pt.found in stretcher comfortably in supine, alert, on room air, no IV line, c/o tightness in the left hip, not in distress, no pain.

## 2022-03-15 NOTE — ED PROVIDER NOTE - CARE PLAN
Principal Discharge DX:	Weakness Principal Discharge DX:	Weakness  Secondary Diagnosis:	Pneumonia due to infectious organism, unspecified laterality, unspecified part of lung Dupixent Pregnancy And Lactation Text: This medication likely crosses the placenta but the risk for the fetus is uncertain. This medication is excreted in breast milk.

## 2022-04-22 NOTE — GOALS OF CARE CONVERSATION - PERSONAL ADVANCE DIRECTIVE - CONVERSATION/DISCUSSION
Hospice Referral/Diagnosis/Treatment Options/Prognosis/MOLST Discussed/Palliative Care Referral Previously Declined (within the last year)

## 2022-12-21 NOTE — PATIENT PROFILE ADULT. - NS PRO OT REFERRAL QUES 1 YN
FAMILY HISTORY:  Family history of leukemia    Sibling  Still living? No  Family history of lung disease, Age at diagnosis: Age Unknown    Grandparent  Still living? No  Family history of heart disease, Age at diagnosis: Age Unknown    
no

## 2024-01-12 NOTE — ED PROVIDER NOTE - PRIOR EKG STATUS
Detail Level: Detailed
Plan: I recommended a broad spectrum sunscreen with a SPF 30+ daily.\\nI explained that SPF 30+ sunscreens block approximately 97 percent of the sun's harmful rays. Sunscreens should be applied at least 15 minutes prior to expected sun exposure and then every 2 hours after that as long as sun exposure continues. If swimming and/or exercising sunscreen should be reapplied every 45 minutes to an hour after getting wet or sweating. One ounce, or the equivalent of a shot glass full of sunscreen, is adequate to protect the skin not covered by clothing. \\nI also recommended a lip balm with a sunscreen as well. Sun protective clothing can be used in lieu of sunscreen but must be worn the entire time you are exposed to the sun's rays.
there is no prior EKG available for comparison

## 2024-08-13 NOTE — ED ADULT NURSE NOTE - CAS DISCH ACCOMP BY
Bam Tobar M.D.     EDT

## 2024-08-25 NOTE — H&P ADULT. - CHARACTER OF DIASTOLIC MURMUR
Procedure: Revision of Winograd onychoplasty's medial and lateral nail borders of the left hallux  CPT code: 87605  Length of Surgery: 45 minutes  Any Instruments: Mini fluoroscopy mini power  Call patient: within 24 hours  Anesthesia: MAC  Location: [facility]  Assistance: none  Pacemaker: No  Anticoagulants: No  Nickel Allergy: No  Latex Allergy: No  Diagnosis/ICD Code:     ICD-10-CM    1. Paronychia of toe of left foot  L03.032       2. Pain of toe of left foot  M79.675           upper lsb/murmur loudness: I/VI
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
## 2025-04-22 NOTE — H&P ADULT. - OBSTRUCTIVE SLEEP APNEA
Take the following medications the morning of surgery:    METOPROLOL    If you are on prescription narcotic pain medication to control your pain you may also take that medication the morning of surgery.      General Instructions:     Do not eat solid food after midnight the night before surgery.  Clear liquids day of surgery are allowed but must be stopped at least two hours before your hospital arrival time.       Allowed clear liquids      Water, sodas, and tea or coffee with no cream or milk added.       12 to 20 ounces of a clear liquid that contains carbohydrates is recommended.  If non-diabetic, have Gatorade or Powerade.  If diabetic, have G2 or Powerade Zero.     Do not have liquids red in color.  Do not consume chicken, beef, pork or vegetable broth or bouillon cubes of any variety as they are not considered clear liquids and are not allowed.      Infants may have breast milk up to four hours before surgery.  Infants drinking formula may drink formula up to six hours before surgery.   Patients who avoid smoking, chewing tobacco and alcohol for 4 weeks prior to surgery have a reduced risk of post-operative complications.  Quit smoking as many days before surgery as you can.  Do not smoke, use chewing tobacco or drink alcohol the day of surgery.   If applicable bring your C-PAP/ BI-PAP machine in with you to preop day of surgery.  Bring any papers given to you in the doctor’s office.  Wear clean comfortable clothes.  Do not wear contact lenses, false eyelashes or make-up.  Bring a case for your glasses.   Bring crutches or walker if applicable.  Remove all piercings.  Leave jewelry and any other valuables at home.  Hair extensions with metal clips must be removed prior to surgery.  The Pre-Admission Testing nurse will instruct you to bring medications if unable to obtain an accurate list in Pre-Admission Testing.    Day of surgery you will need to let the preoperative nurse know the last time you took each of  your medications.  To ensure a safe environment for patients and staff, we kindly ask that children under the age of 16 not accompany patients.  If you must bring a dependent child or dependent adult please ensure a responsible adult, other than yourself, is present to supervise them.      If you were given a blood bank ID arm band remember to bring it with you the day of surgery.    Preventing a Surgical Site Infection:  For 2 to 3 days before surgery, avoid shaving with a razor because the razor can irritate skin and make it easier to develop an infection.    Any areas of open skin can increase the risk of a post-operative wound infection by allowing bacteria to enter and travel throughout the body.  Notify your surgeon if you have any skin wounds / rashes even if it is not near the expected surgical site.  The area will need assessed to determine if surgery should be delayed until it is healed.  The night prior to surgery shower using a fresh bar of anti-bacterial soap (such as Dial) and clean washcloth.  Sleep in a clean bed with clean clothing.  Do not allow pets to sleep with you.  Shower on the morning of surgery using a fresh bar of anti-bacterial soap (such as Dial) and clean washcloth.  Dry with a clean towel and dress in clean clothing.  Ask your surgeon if you will be receiving antibiotics prior to surgery.  Make sure you, your family, and all healthcare providers clean their hands with soap and water or an alcohol based hand  before caring for you or your wound.    Day of surgery:  Your arrival time is approximately two hours before your scheduled surgery time.  Please note if you have an early arrival time the surgery doors do not open before 5:00 AM.  Upon arrival, a Pre-op nurse and Anesthesiologist will review your health history, obtain vital signs, and answer questions you may have.  The only belongings needed at this time will be a list of your home medications and if applicable your  C-PAP/BI-PAP machine.  A Pre-op nurse will start an IV and you may receive medication in preparation for surgery, including something to help you relax.     Please be aware that surgery does come with discomfort.  We want to make every effort to control your discomfort so please discuss any uncontrolled symptoms with your nurse.   Your doctor will most likely have prescribed pain medications.      If you are going home after surgery you will receive individualized written care instructions before being discharged.  A responsible adult must drive you to and from the hospital on the day of your surgery and ideally stay with you through the night.   .  Discharge prescriptions can be filled by the hospital pharmacy during regular pharmacy hours.  If you are having surgery late in the day/evening your prescription may be e-prescribed to your pharmacy.  Please verify your pharmacy hours or chose a 24 hour pharmacy to avoid not having access to your prescription because your pharmacy has closed for the day.    If you are staying overnight following surgery, you will be transported to your hospital room following the recovery period.  [facility] has all private rooms.    If you have any questions please call Pre-Admission Testing at [phone].  Deductibles and co-payments are collected on the day of service. Please be prepared to pay the required co-pay, deductible or deposit on the day of service as defined by your plan.    Call your surgeon immediately if you experience any of the following symptoms:  Sore Throat  Shortness of Breath or difficulty breathing  Cough  Chills  Body soreness or muscle pain  Headache  Fever  New loss of taste or smell  Do not arrive for your surgery ill.  Your procedure will need to be rescheduled to another time.  You will need to call your physician before the day of surgery to avoid any unnecessary exposure to hospital staff as well as other patients.        CHLORHEXIDINE  CLOTH INSTRUCTIONS  The morning of surgery follow these instructions using the Chlorhexidine cloths you've been given.  These steps reduce bacteria on the body.  Do not use the cloths near your eyes, ears mouth, genitalia or on open wounds.  Throw the cloths away after use but do not try to flush them down a toilet.      Open and remove one cloth at a time from the package.    Leave the cloth unfolded and begin the bathing.  Massage the skin with the cloths using gentle pressure to remove bacteria.  Do not scrub harshly.   Follow the steps below with one 2% CHG cloth per area (6 total cloths).  One cloth for neck, shoulders and chest.  One cloth for both arms, hands, fingers and underarms (do underarms last).  One cloth for the abdomen followed by groin.  One cloth for right leg and foot including between the toes.  One cloth for left leg and foot including between the toes.  The last cloth is to be used for the back of the neck, back and buttocks.    Allow the CHG to air dry 3 minutes on the skin which will give it time to work and decrease the chance of irritation.  The skin may feel sticky until it is dry.  Do not rinse with water or any other liquid or you will lose the beneficial effects of the CHG.  If mild skin irritation occurs, do rinse the skin to remove the CHG.  Report this to the nurse at time of admission.  Do not apply lotions, creams, ointments, deodorants or perfumes after using the clothes. Dress in clean clothes before coming to the hospital.   no